# Patient Record
Sex: FEMALE | Race: WHITE | Employment: OTHER
[De-identification: names, ages, dates, MRNs, and addresses within clinical notes are randomized per-mention and may not be internally consistent; named-entity substitution may affect disease eponyms.]

---

## 2017-01-10 RX ORDER — CLONAZEPAM 0.5 MG/1
0.5 TABLET ORAL 2 TIMES DAILY PRN
Qty: 10 TABLET | Refills: 0 | OUTPATIENT
Start: 2017-01-10 | End: 2017-01-24 | Stop reason: SDUPTHER

## 2017-01-17 ENCOUNTER — PROCEDURE VISIT (OUTPATIENT)
Dept: OBGYN | Facility: CLINIC | Age: 52
End: 2017-01-17

## 2017-01-17 VITALS
HEIGHT: 69 IN | BODY MASS INDEX: 32.14 KG/M2 | DIASTOLIC BLOOD PRESSURE: 86 MMHG | HEART RATE: 78 BPM | WEIGHT: 217 LBS | SYSTOLIC BLOOD PRESSURE: 128 MMHG

## 2017-01-17 DIAGNOSIS — R87.610 ATYPICAL SQUAMOUS CELLS OF UNDETERMINED SIGNIFICANCE ON CYTOLOGIC SMEAR OF CERVIX (ASC-US): ICD-10-CM

## 2017-01-17 DIAGNOSIS — Z32.02 NEGATIVE PREGNANCY TEST: ICD-10-CM

## 2017-01-17 DIAGNOSIS — B97.7 HPV IN FEMALE: ICD-10-CM

## 2017-01-17 DIAGNOSIS — R87.810 ASCUS WITH POSITIVE HIGH RISK HPV CERVICAL: Primary | ICD-10-CM

## 2017-01-17 DIAGNOSIS — R87.610 ASCUS WITH POSITIVE HIGH RISK HPV CERVICAL: Primary | ICD-10-CM

## 2017-01-17 PROBLEM — R87.619 ABNORMAL PAP SMEAR OF CERVIX: Status: ACTIVE | Noted: 2017-01-17

## 2017-01-17 LAB
CONTROL: NORMAL
PREGNANCY TEST URINE, POC: NEGATIVE

## 2017-01-17 PROCEDURE — 81025 URINE PREGNANCY TEST: CPT | Performed by: OBSTETRICS & GYNECOLOGY

## 2017-01-17 PROCEDURE — 57456 ENDOCERV CURETTAGE W/SCOPE: CPT | Performed by: OBSTETRICS & GYNECOLOGY

## 2017-01-17 PROCEDURE — 1036F TOBACCO NON-USER: CPT | Performed by: OBSTETRICS & GYNECOLOGY

## 2017-01-20 RX ORDER — CLONAZEPAM 0.5 MG/1
TABLET ORAL
Qty: 10 TABLET | Refills: 0 | OUTPATIENT
Start: 2017-01-20

## 2017-01-24 ENCOUNTER — OFFICE VISIT (OUTPATIENT)
Dept: INTERNAL MEDICINE | Facility: CLINIC | Age: 52
End: 2017-01-24

## 2017-01-24 VITALS
SYSTOLIC BLOOD PRESSURE: 126 MMHG | DIASTOLIC BLOOD PRESSURE: 78 MMHG | HEART RATE: 78 BPM | BODY MASS INDEX: 32.14 KG/M2 | HEIGHT: 69 IN | WEIGHT: 217 LBS

## 2017-01-24 DIAGNOSIS — F41.9 ANXIETY: ICD-10-CM

## 2017-01-24 DIAGNOSIS — I10 ESSENTIAL HYPERTENSION: ICD-10-CM

## 2017-01-24 DIAGNOSIS — Z13.1 DIABETES MELLITUS SCREENING: ICD-10-CM

## 2017-01-24 DIAGNOSIS — E78.1 PURE HYPERGLYCERIDEMIA: ICD-10-CM

## 2017-01-24 DIAGNOSIS — Z00.00 PREVENTATIVE HEALTH CARE: ICD-10-CM

## 2017-01-24 DIAGNOSIS — Z63.6 CAREGIVER STRESS: Primary | ICD-10-CM

## 2017-01-24 PROCEDURE — G8427 DOCREV CUR MEDS BY ELIG CLIN: HCPCS | Performed by: NURSE PRACTITIONER

## 2017-01-24 PROCEDURE — 1036F TOBACCO NON-USER: CPT | Performed by: NURSE PRACTITIONER

## 2017-01-24 PROCEDURE — 99214 OFFICE O/P EST MOD 30 MIN: CPT | Performed by: NURSE PRACTITIONER

## 2017-01-24 PROCEDURE — 3014F SCREEN MAMMO DOC REV: CPT | Performed by: NURSE PRACTITIONER

## 2017-01-24 PROCEDURE — 3017F COLORECTAL CA SCREEN DOC REV: CPT | Performed by: NURSE PRACTITIONER

## 2017-01-24 PROCEDURE — G8484 FLU IMMUNIZE NO ADMIN: HCPCS | Performed by: NURSE PRACTITIONER

## 2017-01-24 PROCEDURE — G8419 CALC BMI OUT NRM PARAM NOF/U: HCPCS | Performed by: NURSE PRACTITIONER

## 2017-01-24 RX ORDER — CLONAZEPAM 0.5 MG/1
0.5 TABLET ORAL 2 TIMES DAILY PRN
Qty: 30 TABLET | Refills: 0 | Status: SHIPPED | OUTPATIENT
Start: 2017-01-24 | End: 2017-02-06 | Stop reason: CLARIF

## 2017-01-24 SDOH — SOCIAL STABILITY - SOCIAL INSECURITY: DEPENDENT RELATIVE NEEDING CARE AT HOME: Z63.6

## 2017-01-24 ASSESSMENT — ENCOUNTER SYMPTOMS
ABDOMINAL PAIN: 0
EYE DISCHARGE: 0
COUGH: 0
BLOOD IN STOOL: 0
SHORTNESS OF BREATH: 0

## 2017-01-30 ENCOUNTER — TELEPHONE (OUTPATIENT)
Dept: INTERNAL MEDICINE | Facility: CLINIC | Age: 52
End: 2017-01-30

## 2017-01-30 DIAGNOSIS — Z13.9 SCREENING: Primary | ICD-10-CM

## 2017-02-06 ENCOUNTER — TELEPHONE (OUTPATIENT)
Dept: INTERNAL MEDICINE | Facility: CLINIC | Age: 52
End: 2017-02-06

## 2017-02-06 DIAGNOSIS — Z63.6 CAREGIVER STRESS: ICD-10-CM

## 2017-02-06 DIAGNOSIS — F41.9 ANXIETY: Primary | ICD-10-CM

## 2017-02-06 RX ORDER — CLONAZEPAM 0.5 MG/1
0.5 TABLET ORAL 2 TIMES DAILY
Qty: 60 TABLET | Refills: 0 | Status: SHIPPED | OUTPATIENT
Start: 2017-02-06 | End: 2017-03-01 | Stop reason: SDUPTHER

## 2017-02-06 SDOH — SOCIAL STABILITY - SOCIAL INSECURITY: DEPENDENT RELATIVE NEEDING CARE AT HOME: Z63.6

## 2017-03-01 ENCOUNTER — OFFICE VISIT (OUTPATIENT)
Dept: INTERNAL MEDICINE | Facility: CLINIC | Age: 52
End: 2017-03-01

## 2017-03-01 VITALS
BODY MASS INDEX: 33.33 KG/M2 | SYSTOLIC BLOOD PRESSURE: 138 MMHG | HEIGHT: 69 IN | WEIGHT: 225 LBS | DIASTOLIC BLOOD PRESSURE: 76 MMHG

## 2017-03-01 DIAGNOSIS — F41.9 ANXIETY: ICD-10-CM

## 2017-03-01 DIAGNOSIS — Z63.6 CAREGIVER STRESS: ICD-10-CM

## 2017-03-01 DIAGNOSIS — I10 ESSENTIAL HYPERTENSION: Primary | ICD-10-CM

## 2017-03-01 PROCEDURE — G8427 DOCREV CUR MEDS BY ELIG CLIN: HCPCS | Performed by: INTERNAL MEDICINE

## 2017-03-01 PROCEDURE — 99213 OFFICE O/P EST LOW 20 MIN: CPT | Performed by: INTERNAL MEDICINE

## 2017-03-01 PROCEDURE — 1036F TOBACCO NON-USER: CPT | Performed by: INTERNAL MEDICINE

## 2017-03-01 PROCEDURE — 3014F SCREEN MAMMO DOC REV: CPT | Performed by: INTERNAL MEDICINE

## 2017-03-01 PROCEDURE — 3017F COLORECTAL CA SCREEN DOC REV: CPT | Performed by: INTERNAL MEDICINE

## 2017-03-01 PROCEDURE — G8484 FLU IMMUNIZE NO ADMIN: HCPCS | Performed by: INTERNAL MEDICINE

## 2017-03-01 PROCEDURE — G8417 CALC BMI ABV UP PARAM F/U: HCPCS | Performed by: INTERNAL MEDICINE

## 2017-03-01 RX ORDER — CLONAZEPAM 0.5 MG/1
0.5 TABLET ORAL 2 TIMES DAILY
Qty: 60 TABLET | Refills: 0 | Status: SHIPPED | OUTPATIENT
Start: 2017-03-01 | End: 2017-04-06 | Stop reason: SDUPTHER

## 2017-03-01 SDOH — SOCIAL STABILITY - SOCIAL INSECURITY: DEPENDENT RELATIVE NEEDING CARE AT HOME: Z63.6

## 2017-03-15 DIAGNOSIS — E78.5 HYPERLIPIDEMIA: ICD-10-CM

## 2017-03-15 DIAGNOSIS — B00.1 RECURRENT COLD SORES: ICD-10-CM

## 2017-03-15 RX ORDER — ATORVASTATIN CALCIUM 40 MG/1
TABLET, FILM COATED ORAL
Qty: 30 TABLET | Refills: 11 | Status: SHIPPED | OUTPATIENT
Start: 2017-03-15 | End: 2017-05-31 | Stop reason: SDUPTHER

## 2017-03-15 RX ORDER — VALACYCLOVIR HYDROCHLORIDE 1 G/1
TABLET, FILM COATED ORAL
Qty: 30 TABLET | Refills: 11 | Status: SHIPPED | OUTPATIENT
Start: 2017-03-15 | End: 2018-03-16 | Stop reason: SDUPTHER

## 2017-04-03 DIAGNOSIS — F41.9 ANXIETY: ICD-10-CM

## 2017-04-03 DIAGNOSIS — Z63.6 CAREGIVER STRESS: ICD-10-CM

## 2017-04-03 SDOH — SOCIAL STABILITY - SOCIAL INSECURITY: DEPENDENT RELATIVE NEEDING CARE AT HOME: Z63.6

## 2017-04-04 RX ORDER — CLONAZEPAM 0.5 MG/1
0.5 TABLET ORAL 2 TIMES DAILY
Qty: 60 TABLET | Refills: 0 | OUTPATIENT
Start: 2017-04-04

## 2017-04-07 DIAGNOSIS — Z63.6 CAREGIVER STRESS: ICD-10-CM

## 2017-04-07 DIAGNOSIS — F41.9 ANXIETY: ICD-10-CM

## 2017-04-07 RX ORDER — CLONAZEPAM 0.5 MG/1
0.5 TABLET ORAL 2 TIMES DAILY
Qty: 60 TABLET | Refills: 0 | Status: SHIPPED | OUTPATIENT
Start: 2017-04-07 | End: 2017-05-03 | Stop reason: SDUPTHER

## 2017-04-07 RX ORDER — CLONAZEPAM 0.5 MG/1
0.5 TABLET ORAL 2 TIMES DAILY
Qty: 60 TABLET | Refills: 0 | OUTPATIENT
Start: 2017-04-07 | End: 2017-04-07 | Stop reason: SDUPTHER

## 2017-04-07 SDOH — SOCIAL STABILITY - SOCIAL INSECURITY: DEPENDENT RELATIVE NEEDING CARE AT HOME: Z63.6

## 2017-05-03 DIAGNOSIS — Z63.6 CAREGIVER STRESS: ICD-10-CM

## 2017-05-03 DIAGNOSIS — F41.9 ANXIETY: ICD-10-CM

## 2017-05-03 RX ORDER — CLONAZEPAM 0.5 MG/1
0.5 TABLET ORAL 2 TIMES DAILY
Qty: 60 TABLET | Refills: 0 | Status: SHIPPED | OUTPATIENT
Start: 2017-05-03 | End: 2017-05-31 | Stop reason: SDUPTHER

## 2017-05-03 SDOH — SOCIAL STABILITY - SOCIAL INSECURITY: DEPENDENT RELATIVE NEEDING CARE AT HOME: Z63.6

## 2017-05-18 RX ORDER — ESCITALOPRAM OXALATE 10 MG/1
TABLET ORAL
Qty: 40 TABLET | Refills: 5 | Status: SHIPPED | OUTPATIENT
Start: 2017-05-18 | End: 2017-08-30 | Stop reason: SDUPTHER

## 2017-05-31 ENCOUNTER — OFFICE VISIT (OUTPATIENT)
Dept: INTERNAL MEDICINE CLINIC | Age: 52
End: 2017-05-31
Payer: MEDICARE

## 2017-05-31 VITALS
WEIGHT: 220 LBS | SYSTOLIC BLOOD PRESSURE: 124 MMHG | HEIGHT: 69 IN | DIASTOLIC BLOOD PRESSURE: 80 MMHG | BODY MASS INDEX: 32.58 KG/M2

## 2017-05-31 DIAGNOSIS — F41.9 ANXIETY: ICD-10-CM

## 2017-05-31 DIAGNOSIS — J01.00 ACUTE MAXILLARY SINUSITIS, RECURRENCE NOT SPECIFIED: Primary | ICD-10-CM

## 2017-05-31 DIAGNOSIS — Z63.6 CAREGIVER STRESS: ICD-10-CM

## 2017-05-31 PROCEDURE — G8427 DOCREV CUR MEDS BY ELIG CLIN: HCPCS | Performed by: INTERNAL MEDICINE

## 2017-05-31 PROCEDURE — 1036F TOBACCO NON-USER: CPT | Performed by: INTERNAL MEDICINE

## 2017-05-31 PROCEDURE — 3017F COLORECTAL CA SCREEN DOC REV: CPT | Performed by: INTERNAL MEDICINE

## 2017-05-31 PROCEDURE — G8417 CALC BMI ABV UP PARAM F/U: HCPCS | Performed by: INTERNAL MEDICINE

## 2017-05-31 PROCEDURE — 3014F SCREEN MAMMO DOC REV: CPT | Performed by: INTERNAL MEDICINE

## 2017-05-31 PROCEDURE — 99213 OFFICE O/P EST LOW 20 MIN: CPT | Performed by: INTERNAL MEDICINE

## 2017-05-31 RX ORDER — AMOXICILLIN AND CLAVULANATE POTASSIUM 875; 125 MG/1; MG/1
1 TABLET, FILM COATED ORAL 2 TIMES DAILY
Qty: 14 TABLET | Refills: 0 | Status: SHIPPED | OUTPATIENT
Start: 2017-05-31 | End: 2017-06-07

## 2017-05-31 RX ORDER — CLONAZEPAM 0.5 MG/1
0.5 TABLET ORAL 2 TIMES DAILY
Qty: 60 TABLET | Refills: 0 | Status: SHIPPED | OUTPATIENT
Start: 2017-05-31 | End: 2017-07-03 | Stop reason: SDUPTHER

## 2017-05-31 SDOH — SOCIAL STABILITY - SOCIAL INSECURITY: DEPENDENT RELATIVE NEEDING CARE AT HOME: Z63.6

## 2017-07-03 DIAGNOSIS — Z63.6 CAREGIVER STRESS: ICD-10-CM

## 2017-07-03 DIAGNOSIS — F41.9 ANXIETY: ICD-10-CM

## 2017-07-03 RX ORDER — CLONAZEPAM 0.5 MG/1
0.5 TABLET ORAL 2 TIMES DAILY
Qty: 60 TABLET | Refills: 0 | Status: SHIPPED | OUTPATIENT
Start: 2017-07-03 | End: 2017-08-02 | Stop reason: SDUPTHER

## 2017-07-03 SDOH — SOCIAL STABILITY - SOCIAL INSECURITY: DEPENDENT RELATIVE NEEDING CARE AT HOME: Z63.6

## 2017-07-24 ENCOUNTER — OFFICE VISIT (OUTPATIENT)
Dept: INTERNAL MEDICINE CLINIC | Age: 52
End: 2017-07-24
Payer: MEDICARE

## 2017-07-24 ENCOUNTER — TELEPHONE (OUTPATIENT)
Dept: INTERNAL MEDICINE CLINIC | Age: 52
End: 2017-07-24

## 2017-07-24 VITALS
WEIGHT: 219 LBS | BODY MASS INDEX: 32.44 KG/M2 | SYSTOLIC BLOOD PRESSURE: 122 MMHG | DIASTOLIC BLOOD PRESSURE: 78 MMHG | HEIGHT: 69 IN

## 2017-07-24 DIAGNOSIS — M25.512 ACUTE PAIN OF LEFT SHOULDER: Primary | ICD-10-CM

## 2017-07-24 DIAGNOSIS — M25.512 LEFT SHOULDER PAIN, UNSPECIFIED CHRONICITY: Primary | ICD-10-CM

## 2017-07-24 PROCEDURE — G8417 CALC BMI ABV UP PARAM F/U: HCPCS | Performed by: NURSE PRACTITIONER

## 2017-07-24 PROCEDURE — G8427 DOCREV CUR MEDS BY ELIG CLIN: HCPCS | Performed by: NURSE PRACTITIONER

## 2017-07-24 PROCEDURE — 1036F TOBACCO NON-USER: CPT | Performed by: NURSE PRACTITIONER

## 2017-07-24 PROCEDURE — 3017F COLORECTAL CA SCREEN DOC REV: CPT | Performed by: NURSE PRACTITIONER

## 2017-07-24 PROCEDURE — 3014F SCREEN MAMMO DOC REV: CPT | Performed by: NURSE PRACTITIONER

## 2017-07-24 PROCEDURE — 99213 OFFICE O/P EST LOW 20 MIN: CPT | Performed by: NURSE PRACTITIONER

## 2017-07-24 RX ORDER — OXYCODONE AND ACETAMINOPHEN 7.5; 325 MG/1; MG/1
TABLET ORAL
COMMUNITY
Start: 2017-07-17

## 2017-07-24 ASSESSMENT — ENCOUNTER SYMPTOMS
SINUS PRESSURE: 0
RHINORRHEA: 0
WHEEZING: 0
COUGH: 0
ABDOMINAL PAIN: 0
SHORTNESS OF BREATH: 0
EYE DISCHARGE: 0
BLOOD IN STOOL: 0

## 2017-08-02 DIAGNOSIS — Z63.6 CAREGIVER STRESS: ICD-10-CM

## 2017-08-02 DIAGNOSIS — F41.9 ANXIETY: ICD-10-CM

## 2017-08-02 RX ORDER — CLONAZEPAM 0.5 MG/1
0.5 TABLET ORAL 2 TIMES DAILY
Qty: 60 TABLET | Refills: 0 | Status: SHIPPED | OUTPATIENT
Start: 2017-08-02 | End: 2017-08-30 | Stop reason: SDUPTHER

## 2017-08-02 SDOH — SOCIAL STABILITY - SOCIAL INSECURITY: DEPENDENT RELATIVE NEEDING CARE AT HOME: Z63.6

## 2017-08-03 ENCOUNTER — HOSPITAL ENCOUNTER (OUTPATIENT)
Age: 52
Discharge: HOME | End: 2017-08-03
Payer: MEDICARE

## 2017-08-03 ENCOUNTER — OFFICE VISIT (OUTPATIENT)
Dept: OBGYN CLINIC | Age: 52
End: 2017-08-03
Payer: MEDICARE

## 2017-08-03 ENCOUNTER — HOSPITAL ENCOUNTER (OUTPATIENT)
Age: 52
Setting detail: SPECIMEN
Discharge: HOME | End: 2017-08-03
Payer: MEDICARE

## 2017-08-03 VITALS
HEIGHT: 69 IN | BODY MASS INDEX: 32.44 KG/M2 | HEART RATE: 78 BPM | WEIGHT: 219 LBS | SYSTOLIC BLOOD PRESSURE: 124 MMHG | RESPIRATION RATE: 18 BRPM | DIASTOLIC BLOOD PRESSURE: 82 MMHG

## 2017-08-03 DIAGNOSIS — Z01.411 ENCOUNTER FOR GYNECOLOGICAL EXAMINATION (GENERAL) (ROUTINE) WITH ABNORMAL FINDINGS: ICD-10-CM

## 2017-08-03 DIAGNOSIS — N89.8 VAGINAL DISCHARGE: ICD-10-CM

## 2017-08-03 DIAGNOSIS — Z20.2 POSSIBLE EXPOSURE TO STD: ICD-10-CM

## 2017-08-03 DIAGNOSIS — R39.9 URINARY TRACT INFECTION SYMPTOMS: ICD-10-CM

## 2017-08-03 DIAGNOSIS — Z12.31 SCREENING MAMMOGRAM, ENCOUNTER FOR: ICD-10-CM

## 2017-08-03 DIAGNOSIS — Z01.419 WELL FEMALE EXAM WITH ROUTINE GYNECOLOGICAL EXAM: Primary | ICD-10-CM

## 2017-08-03 DIAGNOSIS — Z11.51 SPECIAL SCREENING EXAMINATION FOR HUMAN PAPILLOMAVIRUS (HPV): ICD-10-CM

## 2017-08-03 LAB
DIRECT EXAM: NORMAL
HAV AB SERPL IA-ACNC: REACTIVE
HEPATITIS B SURFACE ANTIGEN: NONREACTIVE
HEPATITIS C ANTIBODY: REACTIVE
HIV AG/AB: NONREACTIVE
Lab: NORMAL
SPECIMEN DESCRIPTION: NORMAL
STATUS: NORMAL
T. PALLIDUM, IGG: NONREACTIVE

## 2017-08-03 PROCEDURE — 87491 CHLMYD TRACH DNA AMP PROBE: CPT

## 2017-08-03 PROCEDURE — 87340 HEPATITIS B SURFACE AG IA: CPT

## 2017-08-03 PROCEDURE — 87086 URINE CULTURE/COLONY COUNT: CPT

## 2017-08-03 PROCEDURE — 86694 HERPES SIMPLEX NES ANTBDY: CPT

## 2017-08-03 PROCEDURE — G0101 CA SCREEN;PELVIC/BREAST EXAM: HCPCS | Performed by: ADVANCED PRACTICE MIDWIFE

## 2017-08-03 PROCEDURE — G0145 SCR C/V CYTO,THINLAYER,RESCR: HCPCS

## 2017-08-03 PROCEDURE — 87510 GARDNER VAG DNA DIR PROBE: CPT

## 2017-08-03 PROCEDURE — 87591 N.GONORRHOEAE DNA AMP PROB: CPT

## 2017-08-03 PROCEDURE — 87624 HPV HI-RISK TYP POOLED RSLT: CPT

## 2017-08-03 PROCEDURE — 87389 HIV-1 AG W/HIV-1&-2 AB AG IA: CPT

## 2017-08-03 PROCEDURE — 87480 CANDIDA DNA DIR PROBE: CPT

## 2017-08-03 PROCEDURE — 86803 HEPATITIS C AB TEST: CPT

## 2017-08-03 PROCEDURE — 86780 TREPONEMA PALLIDUM: CPT

## 2017-08-03 PROCEDURE — 86696 HERPES SIMPLEX TYPE 2 TEST: CPT

## 2017-08-03 PROCEDURE — 36415 COLL VENOUS BLD VENIPUNCTURE: CPT

## 2017-08-03 PROCEDURE — 86708 HEPATITIS A ANTIBODY: CPT

## 2017-08-03 PROCEDURE — 87660 TRICHOMONAS VAGIN DIR PROBE: CPT

## 2017-08-03 PROCEDURE — 86695 HERPES SIMPLEX TYPE 1 TEST: CPT

## 2017-08-03 ASSESSMENT — PATIENT HEALTH QUESTIONNAIRE - PHQ9
SUM OF ALL RESPONSES TO PHQ9 QUESTIONS 1 & 2: 0
1. LITTLE INTEREST OR PLEASURE IN DOING THINGS: 0
SUM OF ALL RESPONSES TO PHQ QUESTIONS 1-9: 0
2. FEELING DOWN, DEPRESSED OR HOPELESS: 0
1. LITTLE INTEREST OR PLEASURE IN DOING THINGS: 0
SUM OF ALL RESPONSES TO PHQ9 QUESTIONS 1 & 2: 0
2. FEELING DOWN, DEPRESSED OR HOPELESS: 0
SUM OF ALL RESPONSES TO PHQ QUESTIONS 1-9: 0

## 2017-08-04 ENCOUNTER — TELEPHONE (OUTPATIENT)
Dept: OBGYN CLINIC | Age: 52
End: 2017-08-04

## 2017-08-04 ENCOUNTER — HOSPITAL ENCOUNTER (OUTPATIENT)
Age: 52
Discharge: HOME | End: 2017-08-04
Payer: MEDICARE

## 2017-08-04 DIAGNOSIS — R76.8 HEPATITIS C ANTIBODY TEST POSITIVE: ICD-10-CM

## 2017-08-04 DIAGNOSIS — R76.8 POSITIVE HEPATITIS C ANTIBODY TEST: ICD-10-CM

## 2017-08-04 DIAGNOSIS — R76.8 HEPATITIS A ANTIBODY POSITIVE: Primary | ICD-10-CM

## 2017-08-04 DIAGNOSIS — Z01.89 ENCOUNTER FOR OTHER SPECIFIED SPECIAL EXAMINATIONS: ICD-10-CM

## 2017-08-04 DIAGNOSIS — R76.8 HEPATITIS C ANTIBODY POSITIVE IN BLOOD: ICD-10-CM

## 2017-08-04 DIAGNOSIS — R76.8 HEPATITIS A ANTIBODY POSITIVE: ICD-10-CM

## 2017-08-04 LAB
C TRACH DNA GENITAL QL NAA+PROBE: NEGATIVE
CULTURE: NORMAL
CULTURE: NORMAL
HAV IGM SER IA-ACNC: NONREACTIVE
Lab: NORMAL
N. GONORRHOEAE DNA: NEGATIVE
SPECIMEN DESCRIPTION: NORMAL
SPECIMEN DESCRIPTION: NORMAL
STATUS: NORMAL

## 2017-08-04 PROCEDURE — 87522 HEPATITIS C REVRS TRNSCRPJ: CPT

## 2017-08-04 PROCEDURE — 36415 COLL VENOUS BLD VENIPUNCTURE: CPT

## 2017-08-04 PROCEDURE — 86709 HEPATITIS A IGM ANTIBODY: CPT

## 2017-08-07 ENCOUNTER — TELEPHONE (OUTPATIENT)
Dept: OBGYN CLINIC | Age: 52
End: 2017-08-07

## 2017-08-07 PROBLEM — R76.8 HSV-2 SEROPOSITIVE: Status: ACTIVE | Noted: 2017-08-07

## 2017-08-07 PROBLEM — B00.9 HSV-1 (HERPES SIMPLEX VIRUS 1) INFECTION: Status: ACTIVE | Noted: 2017-08-07

## 2017-08-07 LAB
HERPES SIMPLEX VIRUS 1 IGG: 6.61
HERPES SIMPLEX VIRUS 2 IGG: 4.98
HERPES TYPE 1/2 IGM COMBINED: 0.31
HPV SAMPLE: NORMAL
HPV SOURCE: NORMAL
HPV, GENOTYPE 16: NOT DETECTED
HPV, GENOTYPE 18: NOT DETECTED
HPV, HIGH RISK OTHER: NOT DETECTED
HPV, INTERPRETATION: NORMAL

## 2017-08-08 ENCOUNTER — TELEPHONE (OUTPATIENT)
Dept: OBGYN CLINIC | Age: 52
End: 2017-08-08

## 2017-08-08 LAB
DIRECT EXAM: NORMAL
Lab: NORMAL
SPECIMEN DESCRIPTION: NORMAL
SPECIMEN DESCRIPTION: NORMAL
STATUS: NORMAL

## 2017-08-09 DIAGNOSIS — M25.512 LEFT SHOULDER PAIN, UNSPECIFIED CHRONICITY: Primary | ICD-10-CM

## 2017-08-10 LAB — CYTOLOGY REPORT: NORMAL

## 2017-08-11 ENCOUNTER — OFFICE VISIT (OUTPATIENT)
Dept: ORTHOPEDIC SURGERY | Age: 52
End: 2017-08-11
Payer: MEDICARE

## 2017-08-11 VITALS — HEIGHT: 69 IN | BODY MASS INDEX: 31.7 KG/M2 | WEIGHT: 214 LBS

## 2017-08-11 DIAGNOSIS — M25.512 LEFT SHOULDER PAIN, UNSPECIFIED CHRONICITY: Primary | ICD-10-CM

## 2017-08-11 PROCEDURE — 3017F COLORECTAL CA SCREEN DOC REV: CPT | Performed by: ORTHOPAEDIC SURGERY

## 2017-08-11 PROCEDURE — 3014F SCREEN MAMMO DOC REV: CPT | Performed by: ORTHOPAEDIC SURGERY

## 2017-08-11 PROCEDURE — 1036F TOBACCO NON-USER: CPT | Performed by: ORTHOPAEDIC SURGERY

## 2017-08-11 PROCEDURE — 99203 OFFICE O/P NEW LOW 30 MIN: CPT | Performed by: ORTHOPAEDIC SURGERY

## 2017-08-11 PROCEDURE — G8417 CALC BMI ABV UP PARAM F/U: HCPCS | Performed by: ORTHOPAEDIC SURGERY

## 2017-08-11 PROCEDURE — G8427 DOCREV CUR MEDS BY ELIG CLIN: HCPCS | Performed by: ORTHOPAEDIC SURGERY

## 2017-08-11 RX ORDER — METHYLPREDNISOLONE 4 MG/1
TABLET ORAL
Qty: 1 KIT | Refills: 1 | Status: SHIPPED | OUTPATIENT
Start: 2017-08-11 | End: 2017-08-17

## 2017-08-11 RX ORDER — DICLOFENAC SODIUM 75 MG/1
75 TABLET, DELAYED RELEASE ORAL 2 TIMES DAILY
Qty: 60 TABLET | Refills: 2 | Status: SHIPPED | OUTPATIENT
Start: 2017-08-11 | End: 2017-09-05 | Stop reason: SDUPTHER

## 2017-08-23 ENCOUNTER — HOSPITAL ENCOUNTER (OUTPATIENT)
Dept: WOMENS IMAGING | Age: 52
Discharge: HOME | End: 2017-08-23
Payer: MEDICARE

## 2017-08-23 DIAGNOSIS — Z01.419 WELL FEMALE EXAM WITH ROUTINE GYNECOLOGICAL EXAM: ICD-10-CM

## 2017-08-23 DIAGNOSIS — Z12.31 SCREENING MAMMOGRAM, ENCOUNTER FOR: ICD-10-CM

## 2017-08-23 PROCEDURE — G0202 SCR MAMMO BI INCL CAD: HCPCS

## 2017-08-23 ASSESSMENT — ENCOUNTER SYMPTOMS
ROS SKIN COMMENTS: NEGATIVE FOR RASH
EYE DISCHARGE: 0
SHORTNESS OF BREATH: 0
ABDOMINAL PAIN: 0

## 2017-08-30 DIAGNOSIS — F41.9 ANXIETY: ICD-10-CM

## 2017-08-30 DIAGNOSIS — Z63.6 CAREGIVER STRESS: ICD-10-CM

## 2017-08-30 DIAGNOSIS — Z02.83 ENCOUNTER FOR DRUG SCREENING: Primary | ICD-10-CM

## 2017-08-30 RX ORDER — ESCITALOPRAM OXALATE 10 MG/1
TABLET ORAL
Qty: 60 TABLET | Refills: 6 | Status: SHIPPED | OUTPATIENT
Start: 2017-08-30 | End: 2018-04-05 | Stop reason: SDUPTHER

## 2017-08-30 RX ORDER — CLONAZEPAM 0.5 MG/1
0.5 TABLET ORAL 2 TIMES DAILY
Qty: 60 TABLET | Refills: 0 | Status: SHIPPED | OUTPATIENT
Start: 2017-08-30 | End: 2017-09-26 | Stop reason: SDUPTHER

## 2017-08-30 SDOH — SOCIAL STABILITY - SOCIAL INSECURITY: DEPENDENT RELATIVE NEEDING CARE AT HOME: Z63.6

## 2017-09-05 ENCOUNTER — OFFICE VISIT (OUTPATIENT)
Dept: INTERNAL MEDICINE CLINIC | Age: 52
End: 2017-09-05
Payer: MEDICARE

## 2017-09-05 VITALS
DIASTOLIC BLOOD PRESSURE: 72 MMHG | HEIGHT: 69 IN | WEIGHT: 208 LBS | BODY MASS INDEX: 30.81 KG/M2 | SYSTOLIC BLOOD PRESSURE: 122 MMHG

## 2017-09-05 DIAGNOSIS — K14.0 GLOSSITIS: ICD-10-CM

## 2017-09-05 DIAGNOSIS — I10 ESSENTIAL HYPERTENSION: Primary | ICD-10-CM

## 2017-09-05 PROCEDURE — G8427 DOCREV CUR MEDS BY ELIG CLIN: HCPCS | Performed by: INTERNAL MEDICINE

## 2017-09-05 PROCEDURE — 3017F COLORECTAL CA SCREEN DOC REV: CPT | Performed by: INTERNAL MEDICINE

## 2017-09-05 PROCEDURE — 3014F SCREEN MAMMO DOC REV: CPT | Performed by: INTERNAL MEDICINE

## 2017-09-05 PROCEDURE — 99213 OFFICE O/P EST LOW 20 MIN: CPT | Performed by: INTERNAL MEDICINE

## 2017-09-05 PROCEDURE — 1036F TOBACCO NON-USER: CPT | Performed by: INTERNAL MEDICINE

## 2017-09-05 PROCEDURE — G8417 CALC BMI ABV UP PARAM F/U: HCPCS | Performed by: INTERNAL MEDICINE

## 2017-09-08 ENCOUNTER — OFFICE VISIT (OUTPATIENT)
Dept: ORTHOPEDIC SURGERY | Age: 52
End: 2017-09-08
Payer: MEDICARE

## 2017-09-08 VITALS — BODY MASS INDEX: 30.96 KG/M2 | WEIGHT: 209 LBS | HEIGHT: 69 IN

## 2017-09-08 DIAGNOSIS — M25.512 LEFT SHOULDER PAIN, UNSPECIFIED CHRONICITY: Primary | ICD-10-CM

## 2017-09-08 PROCEDURE — 1036F TOBACCO NON-USER: CPT | Performed by: ORTHOPAEDIC SURGERY

## 2017-09-08 PROCEDURE — G8427 DOCREV CUR MEDS BY ELIG CLIN: HCPCS | Performed by: ORTHOPAEDIC SURGERY

## 2017-09-08 PROCEDURE — 3014F SCREEN MAMMO DOC REV: CPT | Performed by: ORTHOPAEDIC SURGERY

## 2017-09-08 PROCEDURE — G8417 CALC BMI ABV UP PARAM F/U: HCPCS | Performed by: ORTHOPAEDIC SURGERY

## 2017-09-08 PROCEDURE — 20610 DRAIN/INJ JOINT/BURSA W/O US: CPT | Performed by: ORTHOPAEDIC SURGERY

## 2017-09-08 PROCEDURE — 99213 OFFICE O/P EST LOW 20 MIN: CPT | Performed by: ORTHOPAEDIC SURGERY

## 2017-09-08 PROCEDURE — 3017F COLORECTAL CA SCREEN DOC REV: CPT | Performed by: ORTHOPAEDIC SURGERY

## 2017-09-08 RX ORDER — METHYLPREDNISOLONE ACETATE 80 MG/ML
80 INJECTION, SUSPENSION INTRA-ARTICULAR; INTRALESIONAL; INTRAMUSCULAR; SOFT TISSUE ONCE
Status: COMPLETED | OUTPATIENT
Start: 2017-09-08 | End: 2017-09-08

## 2017-09-08 RX ORDER — BUPIVACAINE HYDROCHLORIDE 2.5 MG/ML
2 INJECTION, SOLUTION INFILTRATION; PERINEURAL ONCE
Status: COMPLETED | OUTPATIENT
Start: 2017-09-08 | End: 2017-09-08

## 2017-09-08 RX ADMIN — METHYLPREDNISOLONE ACETATE 80 MG: 80 INJECTION, SUSPENSION INTRA-ARTICULAR; INTRALESIONAL; INTRAMUSCULAR; SOFT TISSUE at 16:18

## 2017-09-08 RX ADMIN — BUPIVACAINE HYDROCHLORIDE 5 MG: 2.5 INJECTION, SOLUTION INFILTRATION; PERINEURAL at 16:30

## 2017-09-08 ASSESSMENT — ENCOUNTER SYMPTOMS
NAUSEA: 0
CONSTIPATION: 0
DIARRHEA: 0
COUGH: 0

## 2017-09-18 ENCOUNTER — TELEPHONE (OUTPATIENT)
Dept: ORTHOPEDIC SURGERY | Age: 52
End: 2017-09-18

## 2017-09-18 DIAGNOSIS — M25.512 ACUTE PAIN OF LEFT SHOULDER: Primary | ICD-10-CM

## 2017-09-23 ENCOUNTER — HOSPITAL ENCOUNTER (OUTPATIENT)
Dept: MRI IMAGING | Age: 52
Discharge: HOME | End: 2017-09-23
Payer: MEDICARE

## 2017-09-23 DIAGNOSIS — M25.512 ACUTE PAIN OF LEFT SHOULDER: ICD-10-CM

## 2017-09-23 PROCEDURE — 73221 MRI JOINT UPR EXTREM W/O DYE: CPT

## 2017-09-26 DIAGNOSIS — F41.9 ANXIETY: ICD-10-CM

## 2017-09-26 DIAGNOSIS — Z63.6 CAREGIVER STRESS: ICD-10-CM

## 2017-09-26 SDOH — SOCIAL STABILITY - SOCIAL INSECURITY: DEPENDENT RELATIVE NEEDING CARE AT HOME: Z63.6

## 2017-09-27 RX ORDER — CLONAZEPAM 0.5 MG/1
0.5 TABLET ORAL 2 TIMES DAILY
Qty: 60 TABLET | Refills: 0 | Status: SHIPPED | OUTPATIENT
Start: 2017-09-27 | End: 2017-10-30 | Stop reason: SDUPTHER

## 2017-09-28 ENCOUNTER — HOSPITAL ENCOUNTER (OUTPATIENT)
Age: 52
Setting detail: SPECIMEN
Discharge: HOME | End: 2017-09-28
Payer: MEDICARE

## 2017-09-28 DIAGNOSIS — Z02.83 ENCOUNTER FOR DRUG SCREENING: ICD-10-CM

## 2017-10-03 LAB
6-ACETYLMORPHINE, UR: NOT DETECTED
7-AMINOCLONAZEPAM, URINE: PRESENT
ALPHA-OH-ALPRAZ, URINE: NOT DETECTED
ALPRAZOLAM, URINE: NOT DETECTED
AMPHETAMINES, URINE: NOT DETECTED
BARBITURATES, URINE: NOT DETECTED
BENZOYLECGONINE, UR: NOT DETECTED
BUPRENORPHINE URINE: NOT DETECTED
CARISOPRODOL, UR: NOT DETECTED
CLONAZEPAM, URINE: NOT DETECTED
CODEINE, URINE: NOT DETECTED
CREATININE URINE: 93 MG/DL (ref 20–400)
DIAZEPAM, URINE: NOT DETECTED
DRUGS EXPECTED, UR: NORMAL
EER HI RES INTERP UR: NORMAL
ETHYL GLUCURONIDE UR: NOT DETECTED
FENTANYL URINE: NOT DETECTED
HYDROCODONE, URINE: NOT DETECTED
HYDROMORPHONE, URINE: NOT DETECTED
LORAZEPAM, URINE: NOT DETECTED
MARIJUANA METAB, UR: NOT DETECTED
MDA, UR: NOT DETECTED
MDEA, EVE, UR: NOT DETECTED
MDMA URINE: NOT DETECTED
MEPERIDINE METAB, UR: NOT DETECTED
METHADONE, URINE: NOT DETECTED
METHAMPHETAMINE, URINE: NOT DETECTED
METHYLPHENIDATE: NOT DETECTED
MIDAZOLAM, URINE: NOT DETECTED
MORPHINE URINE: NOT DETECTED
NORBUPRENORPHINE, URINE: NOT DETECTED
NORDIAZEPAM, URINE: NOT DETECTED
NORFENTANYL, URINE: NOT DETECTED
NORHYDROCODONE, URINE: NOT DETECTED
NOROXYCODONE, URINE: PRESENT
NOROXYMORPHONE, URINE: PRESENT
OXAZEPAM, URINE: NOT DETECTED
OXYCODONE URINE: PRESENT
OXYMORPHONE, URINE: NOT DETECTED
PAIN MANAGEMENT DRUG PANEL INTERP, URINE: NORMAL
PAIN MGT DRUG PANEL, HI RES, UR: NORMAL
PCP,URINE: NOT DETECTED
PHENTERMINE, UR: NOT DETECTED
PROPOXYPHENE, URINE: NOT DETECTED
TAPENTADOL, URINE: NOT DETECTED
TAPENTADOL-O-SULFATE, URINE: NOT DETECTED
TEMAZEPAM, URINE: NOT DETECTED
TRAMADOL, URINE: NOT DETECTED
ZOLPIDEM, URINE: NOT DETECTED

## 2017-10-04 ENCOUNTER — OFFICE VISIT (OUTPATIENT)
Dept: ORTHOPEDIC SURGERY | Age: 52
End: 2017-10-04
Payer: MEDICARE

## 2017-10-04 VITALS — HEIGHT: 69 IN | BODY MASS INDEX: 30.21 KG/M2 | WEIGHT: 204 LBS

## 2017-10-04 DIAGNOSIS — M75.42 ROTATOR CUFF IMPINGEMENT SYNDROME, LEFT: Primary | ICD-10-CM

## 2017-10-04 PROCEDURE — 1036F TOBACCO NON-USER: CPT | Performed by: ORTHOPAEDIC SURGERY

## 2017-10-04 PROCEDURE — G8427 DOCREV CUR MEDS BY ELIG CLIN: HCPCS | Performed by: ORTHOPAEDIC SURGERY

## 2017-10-04 PROCEDURE — G8417 CALC BMI ABV UP PARAM F/U: HCPCS | Performed by: ORTHOPAEDIC SURGERY

## 2017-10-04 PROCEDURE — 99213 OFFICE O/P EST LOW 20 MIN: CPT | Performed by: ORTHOPAEDIC SURGERY

## 2017-10-04 PROCEDURE — 3017F COLORECTAL CA SCREEN DOC REV: CPT | Performed by: ORTHOPAEDIC SURGERY

## 2017-10-04 PROCEDURE — G8484 FLU IMMUNIZE NO ADMIN: HCPCS | Performed by: ORTHOPAEDIC SURGERY

## 2017-10-04 PROCEDURE — 3014F SCREEN MAMMO DOC REV: CPT | Performed by: ORTHOPAEDIC SURGERY

## 2017-10-04 RX ORDER — METHYLPREDNISOLONE 4 MG/1
TABLET ORAL
Qty: 1 KIT | Refills: 1 | Status: SHIPPED | OUTPATIENT
Start: 2017-10-04 | End: 2017-10-10

## 2017-10-04 ASSESSMENT — ENCOUNTER SYMPTOMS
WHEEZING: 0
NAUSEA: 0
DIARRHEA: 0
CONSTIPATION: 0
COUGH: 0
TROUBLE SWALLOWING: 0
ABDOMINAL PAIN: 0
VOMITING: 0
VOICE CHANGE: 0
CHOKING: 0
SHORTNESS OF BREATH: 0

## 2017-10-04 NOTE — PROGRESS NOTES
weakness and pain with internal rotation resistance on the left. Motor, sensory, vascular examination left upper extremity is grossly intact without focal deficits. Neuro: alert. oriented  Eyes: Extra-ocular muscles intact  Mouth: Oral mucosa moist. No perioral lesions  Pulm: Respirations unlabored and regular. Skin: warm, well perfused  Psych:   Patient has good fund of knowledge and displays understanging of exam, diagnosis, and plan. Assessment:      1. Rotator cuff impingement syndrome, left       Plan:      Discussed the etiologies of left RTC inflammation. I would like to have formal physical therapy over the next 6 weeks to see if that will help the patient. I plan to see the patient back in 8 weeks or sooner as necessary. Leia Oquendo LPN am scribing for and in the presence of Dr Abby Zapien. 10/15/2017  10:48 PM              Follow up:Return in about 4 weeks (around 11/1/2017). Orders Placed This Encounter   Medications    methylPREDNISolone (MEDROL DOSEPACK) 4 MG tablet     Sig: Take by mouth. Dispense:  1 kit     Refill:  1          Orders Placed This Encounter   Procedures    Ambulatory referral to Physical Therapy     Referral Priority:   Routine     Referral Type:   Eval and Treat     Referral Reason:   Specialty Services Required     Requested Specialty:   Physical Therapy     Number of Visits Requested:   1       I have reviewed and made changes accordingly to the work scribed by Chico Pinto LPN. The documentation accurately reflects work and decisions made by me. I have also reviewed documentation completed by clinical staff.     Abby Zapien DO, [address] [place] [address]  10/15/2017 10:49 PM      Electronically signed by Melanie Correa DO, FAOAO on 10/15/2017 at 10:48 PM

## 2017-10-04 NOTE — MR AVS SNAPSHOT
S/P removal of right ovary    Personal history of tobacco use, presenting hazards to health    Urinary frequency    Carpal tunnel syndrome, bilateral    Vision abnormalities    History of ankle fracture    Carpal tunnel syndrome on right (Chronic)    Obesity    HSV-1 (herpes simplex virus 1) infection    HSV-2 seropositive    Anxiety    Caregiver stress    Obesity, unspecified    Arthritis    Mood swings (HCC)    Abnormal Pap smear of cervix    Carpal tunnel syndrome, bilateral    Status post carpal tunnel release of both wrists    Hyperlipidemia    History of pneumonia      Preventive Care        Date Due    Tetanus Combination Vaccine (1 - Tdap) 5/6/1983    Yearly Flu Vaccine (1) 9/1/2017    Pap Smear 8/3/2018    Colonoscopy 8/15/2019    Mammograms are recommended every 2 years for low/average risk patients aged 48 - 69, and every year for high risk patients per updated national guidelines. However these guidelines can be individualized by your provider. 8/23/2019    Cholesterol Screening 12/29/2020            MyChart Signup           Our records indicate that you have declined MyChart signup.

## 2017-10-30 DIAGNOSIS — Z63.6 CAREGIVER STRESS: ICD-10-CM

## 2017-10-30 DIAGNOSIS — F41.9 ANXIETY: ICD-10-CM

## 2017-10-30 RX ORDER — CLONAZEPAM 0.5 MG/1
0.5 TABLET ORAL 2 TIMES DAILY
Qty: 60 TABLET | Refills: 0 | Status: SHIPPED | OUTPATIENT
Start: 2017-10-30 | End: 2017-11-27 | Stop reason: SDUPTHER

## 2017-10-30 SDOH — SOCIAL STABILITY - SOCIAL INSECURITY: DEPENDENT RELATIVE NEEDING CARE AT HOME: Z63.6

## 2017-10-30 NOTE — TELEPHONE ENCOUNTER
Medication Requested: klonopin    Last visit: 17  Next visit: Visit date not found  Last refill: 17    Med contract on file:  [x] yes   [] no    Last urine drug screen: 17  Consistent with medication(s):    [x] yes   [] no    Last OARRS ran: 17    Quantity of medication remainin pills    Who will be picking rx up: patient    Pharmacy if escribed: [facility]

## 2017-11-27 DIAGNOSIS — Z63.6 CAREGIVER STRESS: ICD-10-CM

## 2017-11-27 DIAGNOSIS — F41.9 ANXIETY: ICD-10-CM

## 2017-11-27 SDOH — SOCIAL STABILITY - SOCIAL INSECURITY: DEPENDENT RELATIVE NEEDING CARE AT HOME: Z63.6

## 2017-11-28 RX ORDER — CLONAZEPAM 0.5 MG/1
0.5 TABLET ORAL 2 TIMES DAILY
Qty: 60 TABLET | Refills: 0 | Status: SHIPPED | OUTPATIENT
Start: 2017-11-28 | End: 2017-12-26 | Stop reason: SDUPTHER

## 2017-12-04 RX ORDER — AMLODIPINE BESYLATE 5 MG/1
TABLET ORAL
Qty: 30 TABLET | Refills: 10 | Status: SHIPPED | OUTPATIENT
Start: 2017-12-04 | End: 2018-11-04 | Stop reason: SDUPTHER

## 2017-12-07 ENCOUNTER — OFFICE VISIT (OUTPATIENT)
Dept: ORTHOPEDIC SURGERY | Age: 52
End: 2017-12-07
Payer: MEDICARE

## 2017-12-07 VITALS — HEIGHT: 69 IN | BODY MASS INDEX: 30.2 KG/M2 | WEIGHT: 203.93 LBS

## 2017-12-07 DIAGNOSIS — M75.42 ROTATOR CUFF IMPINGEMENT SYNDROME, LEFT: Primary | ICD-10-CM

## 2017-12-07 PROCEDURE — G8417 CALC BMI ABV UP PARAM F/U: HCPCS | Performed by: ORTHOPAEDIC SURGERY

## 2017-12-07 PROCEDURE — 1036F TOBACCO NON-USER: CPT | Performed by: ORTHOPAEDIC SURGERY

## 2017-12-07 PROCEDURE — 3017F COLORECTAL CA SCREEN DOC REV: CPT | Performed by: ORTHOPAEDIC SURGERY

## 2017-12-07 PROCEDURE — G8427 DOCREV CUR MEDS BY ELIG CLIN: HCPCS | Performed by: ORTHOPAEDIC SURGERY

## 2017-12-07 PROCEDURE — G8484 FLU IMMUNIZE NO ADMIN: HCPCS | Performed by: ORTHOPAEDIC SURGERY

## 2017-12-07 PROCEDURE — 99213 OFFICE O/P EST LOW 20 MIN: CPT | Performed by: ORTHOPAEDIC SURGERY

## 2017-12-07 PROCEDURE — 3014F SCREEN MAMMO DOC REV: CPT | Performed by: ORTHOPAEDIC SURGERY

## 2017-12-07 ASSESSMENT — ENCOUNTER SYMPTOMS
CONSTIPATION: 0
NAUSEA: 0
COUGH: 0
DIARRHEA: 0

## 2017-12-07 NOTE — PROGRESS NOTES
[address] [place] [address] [place] [address]  Dept: [phone]  Dept Fax: [phone]        Ambulatory Follow Up      Subjective:   Verna Purcell is a 47y.o. year old female who presents to our office today for routine followup regarding her   1. Rotator cuff impingement syndrome, left    . Chief Complaint   Patient presents with    Shoulder Pain     left        HPI-Patient is here today for 6 week follow up for her left shoulder. Patient says the steroid pack helps immensely and is the only thing that seems to help her pain. Patient says when she takes  A steroid pack she feels 90% better. When she is off she goes back to having some pain, but not as bad as the original pain. Therefore overall she does note some improvement with the MDPs. Patient is doing a home exercise program for her shoulder and she says she can do more exercises when she is on the steroid pack. Patient had a cortisone injection on 09/08/217 with no improvement. Review of Systems   Constitutional: Negative for chills and fever. Respiratory: Negative for cough. Gastrointestinal: Negative for constipation, diarrhea and nausea. Musculoskeletal: Positive for arthralgias (left shoulder). Negative for gait problem, joint swelling and myalgias. Neurological: Negative for dizziness, weakness and numbness. I have reviewed the CC, HPI, ROS, PMH, FHX, Social History. I agree with the documentation provided by other staff, residents, and/or medical students and have reviewed their documentation prior to providing my signature indicating agreement. Objective :   General: Verna Purcell is a 47 y.o. female who is alert and oriented and sitting comfortably in our office. Ortho Exam  MS:  F=160 AB=160 Good ER strength Good IR strength, mild pain with ER and IR left shoulder  Neuro: alert.  oriented  Eyes: Extra-ocular muscles intact  Mouth: Oral mucosa moist. No perioral lesions  Pulm: Respirations unlabored and regular. Skin: warm, well perfused  Psych:   Patient has good fund of knowledge and displays understanging of exam, diagnosis, and plan. Assessment:      1. Rotator cuff impingement syndrome, left       Plan: At this time the patient does not want to proceed with formal PT, she would like to continue with her HEP. She will stop the Voltaren 100mg QD and start the Voltaren 75mg BID. I will also send a MDP for her to take in the future if needed. Follow up 6-8 weeks    Follow up:Return in about 8 weeks (around 2/1/2018). Rahat Anderson RN am scribing for and in the presence of Dr. Paulo Dexter  12/17/2017 11:07 PM    I have reviewed and made changes accordingly to the work scribed by Kanwal Patino RN. The documentation accurately reflects work and decisions made by me. I have also reviewed documentation completed by clinical staff. Paulo Dexter DO, [address] [place] [address]  12/17/2017 11:08 PM      Orders Placed This Encounter   Medications    diclofenac (VOLTAREN) 75 MG EC tablet     Sig: Take 1 tablet by mouth 2 times daily     Dispense:  60 tablet     Refill:  2    methylPREDNISolone (MEDROL DOSEPACK) 4 MG tablet     Sig: Take by mouth. Dispense:  1 kit     Refill:  1          No orders of the defined types were placed in this encounter.       Electronically signed by Yani Cummins DO, [facility] on 12/17/2017 at 11:07 PM

## 2017-12-08 RX ORDER — METHYLPREDNISOLONE 4 MG/1
TABLET ORAL
Qty: 1 KIT | Refills: 1 | Status: SHIPPED | OUTPATIENT
Start: 2017-12-08 | End: 2017-12-13

## 2017-12-08 RX ORDER — DICLOFENAC SODIUM 75 MG/1
75 TABLET, DELAYED RELEASE ORAL 2 TIMES DAILY
Qty: 60 TABLET | Refills: 2 | Status: SHIPPED | OUTPATIENT
Start: 2017-12-08 | End: 2018-08-15 | Stop reason: ALTCHOICE

## 2017-12-26 DIAGNOSIS — Z63.6 CAREGIVER STRESS: ICD-10-CM

## 2017-12-26 DIAGNOSIS — F41.9 ANXIETY: ICD-10-CM

## 2017-12-26 SDOH — SOCIAL STABILITY - SOCIAL INSECURITY: DEPENDENT RELATIVE NEEDING CARE AT HOME: Z63.6

## 2017-12-26 NOTE — TELEPHONE ENCOUNTER
Medication Requested: Klonopin    Last visit: 17  Next visit: Visit date not found  Last refill: 17    Med contract on file:  [x] yes   [] no    Last urine drug screen: 17  Consistent with medication(s):    [x] yes   [] no    Last OARRS ran: 17    Quantity of medication remainin    Who will be picking rx up: self or pts boyfriend, [address] if escribed: [facility]ELIZ

## 2017-12-28 RX ORDER — CLONAZEPAM 0.5 MG/1
0.5 TABLET ORAL 2 TIMES DAILY
Qty: 60 TABLET | Refills: 0 | Status: SHIPPED | OUTPATIENT
Start: 2017-12-28 | End: 2018-01-29 | Stop reason: SDUPTHER

## 2018-01-29 DIAGNOSIS — F41.9 ANXIETY: ICD-10-CM

## 2018-01-29 DIAGNOSIS — Z63.6 CAREGIVER STRESS: ICD-10-CM

## 2018-01-29 RX ORDER — CLONAZEPAM 0.5 MG/1
0.5 TABLET ORAL 2 TIMES DAILY
Qty: 60 TABLET | Refills: 0 | Status: SHIPPED | OUTPATIENT
Start: 2018-01-29 | End: 2018-02-27 | Stop reason: SDUPTHER

## 2018-01-29 SDOH — SOCIAL STABILITY - SOCIAL INSECURITY: DEPENDENT RELATIVE NEEDING CARE AT HOME: Z63.6

## 2018-01-30 DIAGNOSIS — M75.42 ROTATOR CUFF IMPINGEMENT SYNDROME, LEFT: Primary | ICD-10-CM

## 2018-01-30 RX ORDER — METHYLPREDNISOLONE 4 MG/1
TABLET ORAL
Qty: 1 KIT | Refills: 0 | Status: SHIPPED | OUTPATIENT
Start: 2018-01-30 | End: 2018-02-05

## 2018-02-08 ENCOUNTER — OFFICE VISIT (OUTPATIENT)
Dept: ORTHOPEDIC SURGERY | Age: 53
End: 2018-02-08
Payer: MEDICARE

## 2018-02-08 VITALS
DIASTOLIC BLOOD PRESSURE: 78 MMHG | WEIGHT: 192.4 LBS | BODY MASS INDEX: 28.5 KG/M2 | SYSTOLIC BLOOD PRESSURE: 134 MMHG | HEIGHT: 69 IN

## 2018-02-08 DIAGNOSIS — M75.42 IMPINGEMENT SYNDROME OF LEFT SHOULDER: Primary | ICD-10-CM

## 2018-02-08 PROCEDURE — 3017F COLORECTAL CA SCREEN DOC REV: CPT | Performed by: ORTHOPAEDIC SURGERY

## 2018-02-08 PROCEDURE — G8427 DOCREV CUR MEDS BY ELIG CLIN: HCPCS | Performed by: ORTHOPAEDIC SURGERY

## 2018-02-08 PROCEDURE — G8484 FLU IMMUNIZE NO ADMIN: HCPCS | Performed by: ORTHOPAEDIC SURGERY

## 2018-02-08 PROCEDURE — 1036F TOBACCO NON-USER: CPT | Performed by: ORTHOPAEDIC SURGERY

## 2018-02-08 PROCEDURE — 20610 DRAIN/INJ JOINT/BURSA W/O US: CPT | Performed by: ORTHOPAEDIC SURGERY

## 2018-02-08 PROCEDURE — G8417 CALC BMI ABV UP PARAM F/U: HCPCS | Performed by: ORTHOPAEDIC SURGERY

## 2018-02-08 PROCEDURE — 99213 OFFICE O/P EST LOW 20 MIN: CPT | Performed by: ORTHOPAEDIC SURGERY

## 2018-02-08 PROCEDURE — 3014F SCREEN MAMMO DOC REV: CPT | Performed by: ORTHOPAEDIC SURGERY

## 2018-02-08 RX ORDER — DICLOFENAC SODIUM 25 MG/1
TABLET, DELAYED RELEASE ORAL
COMMUNITY
Start: 2015-08-24 | End: 2018-08-15 | Stop reason: ALTCHOICE

## 2018-02-08 RX ORDER — ESCITALOPRAM OXALATE 10 MG/1
TABLET ORAL
COMMUNITY
Start: 2017-08-06 | End: 2018-08-15 | Stop reason: SDUPTHER

## 2018-02-08 RX ORDER — TOPIRAMATE 100 MG/1
TABLET, FILM COATED ORAL
COMMUNITY
Start: 2015-07-27 | End: 2018-08-15 | Stop reason: SDUPTHER

## 2018-02-08 RX ORDER — ATORVASTATIN CALCIUM 40 MG/1
TABLET, FILM COATED ORAL
COMMUNITY
Start: 2017-07-22 | End: 2018-08-15 | Stop reason: SDUPTHER

## 2018-02-08 RX ORDER — CLONAZEPAM 0.5 MG/1
TABLET ORAL
COMMUNITY
Start: 2017-08-03 | End: 2018-03-29 | Stop reason: SDUPTHER

## 2018-02-08 RX ORDER — AMLODIPINE BESYLATE 5 MG/1
TABLET ORAL
COMMUNITY
Start: 2017-08-06 | End: 2018-08-15 | Stop reason: ALTCHOICE

## 2018-02-08 RX ORDER — BUPIVACAINE HYDROCHLORIDE 2.5 MG/ML
2 INJECTION, SOLUTION INFILTRATION; PERINEURAL ONCE
Status: COMPLETED | OUTPATIENT
Start: 2018-02-08 | End: 2018-02-09

## 2018-02-08 RX ORDER — ONDANSETRON 4 MG/1
TABLET, FILM COATED ORAL
COMMUNITY
Start: 2016-04-01 | End: 2018-08-15 | Stop reason: SDUPTHER

## 2018-02-08 RX ORDER — TOPIRAMATE 50 MG/1
TABLET, FILM COATED ORAL
COMMUNITY
Start: 2015-10-17 | End: 2018-08-15 | Stop reason: SDUPTHER

## 2018-02-08 RX ORDER — VALACYCLOVIR HYDROCHLORIDE 1 G/1
TABLET, FILM COATED ORAL
COMMUNITY
Start: 2017-07-22 | End: 2018-08-15 | Stop reason: SDUPTHER

## 2018-02-08 RX ORDER — OXYCODONE AND ACETAMINOPHEN 7.5; 325 MG/1; MG/1
TABLET ORAL
COMMUNITY
Start: 2017-07-17 | End: 2018-08-15 | Stop reason: SDUPTHER

## 2018-02-08 RX ORDER — METHYLPREDNISOLONE ACETATE 80 MG/ML
80 INJECTION, SUSPENSION INTRA-ARTICULAR; INTRALESIONAL; INTRAMUSCULAR; SOFT TISSUE ONCE
Status: COMPLETED | OUTPATIENT
Start: 2018-02-08 | End: 2018-02-09

## 2018-02-08 ASSESSMENT — ENCOUNTER SYMPTOMS
COUGH: 0
CONSTIPATION: 0
DIARRHEA: 0
NAUSEA: 0

## 2018-02-09 RX ADMIN — METHYLPREDNISOLONE ACETATE 80 MG: 80 INJECTION, SUSPENSION INTRA-ARTICULAR; INTRALESIONAL; INTRAMUSCULAR; SOFT TISSUE at 12:40

## 2018-02-09 RX ADMIN — BUPIVACAINE HYDROCHLORIDE 5 MG: 2.5 INJECTION, SOLUTION INFILTRATION; PERINEURAL at 12:40

## 2018-02-27 DIAGNOSIS — F41.9 ANXIETY: ICD-10-CM

## 2018-02-27 DIAGNOSIS — Z63.6 CAREGIVER STRESS: ICD-10-CM

## 2018-02-27 SDOH — SOCIAL STABILITY - SOCIAL INSECURITY: DEPENDENT RELATIVE NEEDING CARE AT HOME: Z63.6

## 2018-03-01 RX ORDER — CLONAZEPAM 0.5 MG/1
0.5 TABLET ORAL 2 TIMES DAILY PRN
Qty: 50 TABLET | Refills: 0 | Status: SHIPPED | OUTPATIENT
Start: 2018-03-01 | End: 2018-08-15 | Stop reason: ALTCHOICE

## 2018-03-01 NOTE — TELEPHONE ENCOUNTER
Per patient her niece, Gudelia Alfaro will be picking up script, advised that she must bring photo Id.

## 2018-03-08 ENCOUNTER — TELEPHONE (OUTPATIENT)
Dept: ORTHOPEDIC SURGERY | Age: 53
End: 2018-03-08

## 2018-03-08 DIAGNOSIS — M75.42 IMPINGEMENT SYNDROME OF LEFT SHOULDER: Primary | ICD-10-CM

## 2018-03-08 RX ORDER — METHYLPREDNISOLONE 4 MG/1
TABLET ORAL
Qty: 1 KIT | Refills: 0 | Status: SHIPPED | OUTPATIENT
Start: 2018-03-08 | End: 2018-04-02 | Stop reason: SDUPTHER

## 2018-03-08 NOTE — TELEPHONE ENCOUNTER
Patient is having left shoulder pain for the last few days would like a medrol dose called into [facility] [place].

## 2018-03-16 DIAGNOSIS — B00.1 RECURRENT COLD SORES: ICD-10-CM

## 2018-03-19 RX ORDER — VALACYCLOVIR HYDROCHLORIDE 1 G/1
TABLET, FILM COATED ORAL
Qty: 30 TABLET | Refills: 10 | Status: SHIPPED | OUTPATIENT
Start: 2018-03-19 | End: 2019-02-20 | Stop reason: SDUPTHER

## 2018-03-23 DIAGNOSIS — E78.5 HYPERLIPIDEMIA: ICD-10-CM

## 2018-03-23 RX ORDER — ATORVASTATIN CALCIUM 40 MG/1
TABLET, FILM COATED ORAL
Qty: 30 TABLET | Refills: 10 | Status: SHIPPED | OUTPATIENT
Start: 2018-03-23 | End: 2018-08-15 | Stop reason: SDUPTHER

## 2018-04-02 ENCOUNTER — TELEPHONE (OUTPATIENT)
Dept: ORTHOPEDIC SURGERY | Age: 53
End: 2018-04-02

## 2018-04-02 DIAGNOSIS — M75.42 IMPINGEMENT SYNDROME OF LEFT SHOULDER: Primary | ICD-10-CM

## 2018-04-02 RX ORDER — METHYLPREDNISOLONE 4 MG/1
TABLET ORAL
Qty: 1 KIT | Refills: 0 | Status: SHIPPED | OUTPATIENT
Start: 2018-04-02 | End: 2018-04-08

## 2018-04-04 RX ORDER — CLONAZEPAM 0.5 MG/1
0.5 TABLET ORAL 2 TIMES DAILY PRN
Qty: 50 TABLET | Refills: 1 | Status: SHIPPED | OUTPATIENT
Start: 2018-04-04 | End: 2018-08-15 | Stop reason: ALTCHOICE

## 2018-04-05 RX ORDER — ESCITALOPRAM OXALATE 10 MG/1
TABLET ORAL
Qty: 60 TABLET | Refills: 4 | Status: SHIPPED | OUTPATIENT
Start: 2018-04-05 | End: 2018-09-17 | Stop reason: SDUPTHER

## 2018-05-10 DIAGNOSIS — M75.42 ROTATOR CUFF IMPINGEMENT SYNDROME, LEFT: Primary | ICD-10-CM

## 2018-05-10 RX ORDER — METHYLPREDNISOLONE 4 MG/1
TABLET ORAL
Qty: 1 KIT | Refills: 0 | Status: SHIPPED | OUTPATIENT
Start: 2018-05-10 | End: 2018-11-14 | Stop reason: ALTCHOICE

## 2018-06-14 ENCOUNTER — OFFICE VISIT (OUTPATIENT)
Dept: ORTHOPEDIC SURGERY | Age: 53
End: 2018-06-14
Payer: MEDICARE

## 2018-06-14 VITALS — WEIGHT: 192.46 LBS | BODY MASS INDEX: 28.51 KG/M2 | HEIGHT: 69 IN

## 2018-06-14 DIAGNOSIS — M77.8 SHOULDER TENDINITIS, LEFT: Primary | ICD-10-CM

## 2018-06-14 PROCEDURE — G8427 DOCREV CUR MEDS BY ELIG CLIN: HCPCS | Performed by: ORTHOPAEDIC SURGERY

## 2018-06-14 PROCEDURE — 1036F TOBACCO NON-USER: CPT | Performed by: ORTHOPAEDIC SURGERY

## 2018-06-14 PROCEDURE — G8417 CALC BMI ABV UP PARAM F/U: HCPCS | Performed by: ORTHOPAEDIC SURGERY

## 2018-06-14 PROCEDURE — 3017F COLORECTAL CA SCREEN DOC REV: CPT | Performed by: ORTHOPAEDIC SURGERY

## 2018-06-14 PROCEDURE — 99213 OFFICE O/P EST LOW 20 MIN: CPT | Performed by: ORTHOPAEDIC SURGERY

## 2018-06-14 RX ORDER — METHYLPREDNISOLONE 4 MG/1
TABLET ORAL
Qty: 1 KIT | Refills: 0 | Status: SHIPPED | OUTPATIENT
Start: 2018-06-14 | End: 2018-06-20

## 2018-06-14 RX ORDER — MELOXICAM 15 MG/1
15 TABLET ORAL DAILY
Qty: 30 TABLET | Refills: 3 | Status: SHIPPED | OUTPATIENT
Start: 2018-06-14 | End: 2018-10-13 | Stop reason: SDUPTHER

## 2018-06-14 ASSESSMENT — ENCOUNTER SYMPTOMS
SHORTNESS OF BREATH: 0
WHEEZING: 0
BACK PAIN: 0

## 2018-06-20 ENCOUNTER — HOSPITAL ENCOUNTER (OUTPATIENT)
Dept: PHYSICAL THERAPY | Age: 53
Setting detail: THERAPIES SERIES
Discharge: HOME | End: 2018-06-20
Payer: MEDICARE

## 2018-06-20 PROCEDURE — 97161 PT EVAL LOW COMPLEX 20 MIN: CPT

## 2018-06-20 PROCEDURE — G8984 CARRY CURRENT STATUS: HCPCS

## 2018-06-20 PROCEDURE — G0283 ELEC STIM OTHER THAN WOUND: HCPCS

## 2018-06-20 PROCEDURE — 97110 THERAPEUTIC EXERCISES: CPT

## 2018-06-20 PROCEDURE — G8985 CARRY GOAL STATUS: HCPCS

## 2018-06-20 ASSESSMENT — PAIN SCALES - GENERAL: PAINLEVEL_OUTOF10: 10

## 2018-06-20 ASSESSMENT — PAIN DESCRIPTION - LOCATION: LOCATION: SHOULDER

## 2018-06-20 ASSESSMENT — PAIN DESCRIPTION - FREQUENCY: FREQUENCY: CONTINUOUS

## 2018-06-20 ASSESSMENT — PAIN DESCRIPTION - DESCRIPTORS: DESCRIPTORS: CONSTANT;SHARP

## 2018-06-20 ASSESSMENT — PAIN DESCRIPTION - ORIENTATION: ORIENTATION: LEFT

## 2018-06-26 ENCOUNTER — HOSPITAL ENCOUNTER (OUTPATIENT)
Dept: PHYSICAL THERAPY | Age: 53
Setting detail: THERAPIES SERIES
Discharge: HOME | End: 2018-06-26
Payer: MEDICARE

## 2018-06-26 PROCEDURE — G0283 ELEC STIM OTHER THAN WOUND: HCPCS

## 2018-06-26 PROCEDURE — 97110 THERAPEUTIC EXERCISES: CPT

## 2018-06-26 ASSESSMENT — PAIN DESCRIPTION - DESCRIPTORS: DESCRIPTORS: CONSTANT

## 2018-06-26 ASSESSMENT — PAIN DESCRIPTION - FREQUENCY: FREQUENCY: CONTINUOUS

## 2018-06-26 ASSESSMENT — PAIN SCALES - GENERAL: PAINLEVEL_OUTOF10: 9

## 2018-06-26 ASSESSMENT — PAIN DESCRIPTION - LOCATION: LOCATION: SHOULDER

## 2018-06-26 ASSESSMENT — PAIN DESCRIPTION - ORIENTATION: ORIENTATION: LEFT

## 2018-06-28 ENCOUNTER — HOSPITAL ENCOUNTER (OUTPATIENT)
Dept: PHYSICAL THERAPY | Age: 53
Setting detail: THERAPIES SERIES
Discharge: HOME | End: 2018-06-28
Payer: MEDICARE

## 2018-08-15 ENCOUNTER — OFFICE VISIT (OUTPATIENT)
Dept: OBGYN CLINIC | Age: 53
End: 2018-08-15

## 2018-08-15 ENCOUNTER — HOSPITAL ENCOUNTER (OUTPATIENT)
Age: 53
Setting detail: SPECIMEN
Discharge: HOME | End: 2018-08-15
Payer: MEDICARE

## 2018-08-15 VITALS
HEART RATE: 78 BPM | BODY MASS INDEX: 25.48 KG/M2 | WEIGHT: 172 LBS | SYSTOLIC BLOOD PRESSURE: 120 MMHG | DIASTOLIC BLOOD PRESSURE: 78 MMHG | HEIGHT: 69 IN | RESPIRATION RATE: 18 BRPM

## 2018-08-15 DIAGNOSIS — Z01.411 ENCOUNTER FOR GYNECOLOGICAL EXAMINATION WITH ABNORMAL FINDING: ICD-10-CM

## 2018-08-15 DIAGNOSIS — Z11.51 SPECIAL SCREENING EXAMINATION FOR HUMAN PAPILLOMAVIRUS (HPV): ICD-10-CM

## 2018-08-15 DIAGNOSIS — Z78.0 POST-MENOPAUSAL: ICD-10-CM

## 2018-08-15 DIAGNOSIS — Z11.51 SPECIAL SCREENING EXAMINATION FOR HUMAN PAPILLOMAVIRUS (HPV): Primary | ICD-10-CM

## 2018-08-15 DIAGNOSIS — Z12.31 SCREENING MAMMOGRAM, ENCOUNTER FOR: ICD-10-CM

## 2018-08-15 DIAGNOSIS — Z13.820 ENCOUNTER FOR SCREENING FOR OSTEOPOROSIS: ICD-10-CM

## 2018-08-15 DIAGNOSIS — R45.86 MOOD SWINGS: ICD-10-CM

## 2018-08-15 PROCEDURE — G0145 SCR C/V CYTO,THINLAYER,RESCR: HCPCS

## 2018-08-15 PROCEDURE — G0101 CA SCREEN;PELVIC/BREAST EXAM: HCPCS | Performed by: ADVANCED PRACTICE MIDWIFE

## 2018-08-15 PROCEDURE — 87624 HPV HI-RISK TYP POOLED RSLT: CPT

## 2018-08-15 RX ORDER — PAROXETINE 10 MG/1
10 TABLET, FILM COATED ORAL EVERY MORNING
Qty: 30 TABLET | Refills: 3 | Status: SHIPPED | OUTPATIENT
Start: 2018-08-15 | End: 2018-09-17

## 2018-08-15 ASSESSMENT — PATIENT HEALTH QUESTIONNAIRE - PHQ9
2. FEELING DOWN, DEPRESSED OR HOPELESS: 0
1. LITTLE INTEREST OR PLEASURE IN DOING THINGS: 0
SUM OF ALL RESPONSES TO PHQ QUESTIONS 1-9: 0
SUM OF ALL RESPONSES TO PHQ9 QUESTIONS 1 & 2: 0
1. LITTLE INTEREST OR PLEASURE IN DOING THINGS: 0
SUM OF ALL RESPONSES TO PHQ QUESTIONS 1-9: 0
SUM OF ALL RESPONSES TO PHQ QUESTIONS 1-9: 0
2. FEELING DOWN, DEPRESSED OR HOPELESS: 0
SUM OF ALL RESPONSES TO PHQ QUESTIONS 1-9: 0
SUM OF ALL RESPONSES TO PHQ9 QUESTIONS 1 & 2: 0

## 2018-08-15 NOTE — PROGRESS NOTES
Diabetes Mother     Stroke Mother     Heart Disease Mother     Heart Disease Father     Heart Disease Sister     Cancer Brother         lymph node and bone    Diabetes Brother     Hypertension Brother     Other Brother         bleeding ulcers     Social History     Social History    Marital status: Single     Spouse name: N/A    Number of children: N/A    Years of education: N/A     Occupational History    Not on file. Social History Main Topics    Smoking status: Former Smoker     Quit date: 3/1/2013    Smokeless tobacco: Never Used    Alcohol use No    Drug use: No    Sexual activity: Yes     Partners: Male     Birth control/ protection: Post-menopausal     Other Topics Concern    Not on file     Social History Narrative    No narrative on file       MEDICATIONS:  Current Outpatient Prescriptions   Medication Sig Dispense Refill    PARoxetine (PAXIL) 10 MG tablet Take 1 tablet by mouth every morning 30 tablet 3    meloxicam (MOBIC) 15 MG tablet Take 1 tablet by mouth daily 30 tablet 3    methylPREDNISolone (MEDROL DOSEPACK) 4 MG tablet Take by mouth. 1 kit 0    escitalopram (LEXAPRO) 10 MG tablet TAKE TWO TABLETS BY MOUTH DAILY 60 tablet 4    valACYclovir (VALTREX) 1 g tablet TAKE ONE TABLET BY MOUTH DAILY AS NEEDED 30 tablet 10    amLODIPine (NORVASC) 5 MG tablet TAKE ONE TABLET BY MOUTH DAILY 30 tablet 10    oxyCODONE-acetaminophen (PERCOCET) 7.5-325 MG per tablet       atorvastatin (LIPITOR) 40 MG tablet TAKE ONE TABLET BY MOUTH DAILY 30 tablet 5    topiramate (TOPAMAX) 50 MG tablet       topiramate (TOPAMAX) 100 MG tablet Take 150 mg by mouth 2 times daily        No current facility-administered medications for this visit.             ALLERGIES:  Allergies as of 08/15/2018 - Review Complete 08/15/2018   Allergen Reaction Noted    Codeine Nausea Only 06/25/2014       Symptoms of decreased mood absent    **If either question is answered in a  positive fashion then complete the Migraines, Epilepsy, Seizure Hx, or Limb Weakness  Dermatological ROS: No Rash, Itching, Hives, Mole Changes or Cancer                                                                                                                                                                                                                                  PHYSICAL Exam:     Constitutional:  Vitals:    08/15/18 1613   BP: 120/78   Site: Right Arm   Position: Sitting   Cuff Size: Medium Adult   Pulse: 78   Resp: 18   Weight: 172 lb (78 kg)   Height: 5' 9\" (1.753 m)       General Appearance: This  is a well Developed, well Nourished, well groomed female. Her BMI was reviewed. Nutritional decision making was discussed. Skin:  There was a Normal Inspection of the skin without rashes or lesions. There were no rashes. (Papular, Maculopapular, Hives, Pustular, Macular)     There were no lesions (Ulcers, Erythema, Abn. Appearing Nevi)            Lymphatic:  No Lymph Nodes were Palpable in the neck , axilla or groin. # Of Lymph Nodes; Location ; Character [Normal]  [Shotty] [Tender] [Enlarged]     Neck and EENT:  The neck was supple. There were no masses   The thyroid was not enlarged and had no masses. Perrla, EOMI B/L, TMI B/L No Abnormalities. Throat inspected-No exudates or Masses, Nares Patent No Masses        Respiratory: The lungs were auscultated and found to be clear. There were no rales, rhonchi or wheezes. There was a good respiratory effort. Cardiovascular: The heart was in a regular rate and rhythm. . No S3 or S4. There was no murmur appreciated. Location, grade, and radiation are not applicable. Extremities: The patients extremities were without calf tenderness, edema, or varicosities. There was full range of motion in all four extremities. Pulses in all four extremities were appreciated and are 2/4. Abdomen: The abdomen was soft and non-tender.  There were good bowel sounds in all quadrants and there was no guarding, rebound or rigidity. On evaluation there was no evidence of hepatosplenomegaly and there was no costal vertebral johnathan tenderness bilaterally. No hernias were appreciated. Abdominal Scars: ovarian abscess removed    Psych: The patient had a normal Orientation to: Time, Place, Person, and Situation  There is no Mood / Affect changes    Breast:  (Chest)  normal appearance, no masses or tenderness, Inspection negative  Self breast exams were reviewed in detail. Literature was given. Pelvic Exam:  Vulva and vagina appear normal. Bimanual exam reveals normal uterus and adnexa. Rectal Exam:  exam declined by patient          Musculosk:  Normal Gait and station was noted. Digits were evaluated without abnormal findings. Range of motion, stability and strength were evaluated and found to be appropriate for the patients age. [facility] Structural Component:  ASSESSMENT:      46 y.o. Annual   Diagnosis Orders   1. Well female exam with routine gynecological exam  [facility] DIGITAL SCREEN W CAD BILATERAL    PAP SMEAR   2. Special screening examination for human papillomavirus (HPV)  PAP SMEAR   3.  Screening mammogram, encounter for  [facility] DIGITAL SCREEN W CAD BILATERAL          Chief Complaint   Patient presents with    Annual Exam          Past Medical History:   Diagnosis Date    Abnormal Pap smear of cervix 1/17/2017    Anxiety 1/24/2017    Arthritis     left ankle, wears ankle brace    Carpal tunnel syndrome, bilateral     History of pneumonia 2013    HSV-1 (herpes simplex virus 1) infection 8/7/2017    Hyperlipidemia     Hypertension     Mood swings (HCC)     due to Menopause, On Lexapro    Obesity, unspecified     Urinary frequency     Vision abnormalities     wears glasses         Patient Active Problem List    Diagnosis Date Noted    Hypertension      Priority: High    History of tobacco abuse 05/22/2015     Priority: High     54 yr pack hx   Quit 2012      HTN (hypertension) 08/19/2014     Priority: High    S/P removal of right ovary      Priority: Medium     infection      Personal history of tobacco use, presenting hazards to health      Priority: Medium    Urinary frequency      Priority: Low    Carpal tunnel syndrome, bilateral      Priority: Low    Vision abnormalities      Priority: Low     glasses      History of ankle fracture 05/22/2015     Priority: Low    Carpal tunnel syndrome on right 10/06/2014     Priority: Low    Obesity      Priority: Low    HSV-1 (herpes simplex virus 1) infection 08/07/2017    HSV-2 seropositive 08/07/2017    Anxiety 01/24/2017    Caregiver stress 01/24/2017    Abnormal Pap smear of cervix 01/17/2017     ASCUS + HPV (other)  Colposcopy completed 1/7/17  PAP 7/2017      Obesity      Updating Deprecated Diagnoses      Arthritis      left ankle, wears ankle brace      Mood swings ([address]      due to Menopause, On Lexapro      Carpal tunnel syndrome, bilateral 11/18/2015    Status post carpal tunnel release of both wrists 11/18/2015    Hyperlipidemia 08/28/2015    History of pneumonia 01/01/2013          Hereditary Breast, Ovarian, Colon and Uterine Cancer screening Done. Tobacco & Secondary smoke risks reviewed; instructed on cessation and avoidance      Counseling Completed:  Preventative Health Recommendations and Follow up. The patient was informed of the recommended preventative health recommendations. 1. Annuals every year; Cytology collections per prevailing guidelines. 2. Mammograms begin every year at 35 yo if no abnormalities are found and no family     History. 3. Bone density studies every 2-3 years. Begin at 73 yo. If no fracture history or osteoporosis family history. (significant). 4. Colonoscopy begin at 49 yo. Repeat every ten years if negative and no family history. 5. Calcium of 0281-6972 mg/day in split dosing  6. Vitamin D 400-800 IU/day  7.  All other preventative health recommendations will be managed by the patients Primary care physician. PLAN:  Mammogram ordered  Dexa Scan ordered  PAP collected call for results  Lexapro discontinued and will start Paxil 10 mg po daily   Repeat Annual every 1 year  Cervical Cytology Evaluation begins at 24years old. If Negative Cytology, Follow-up screening per current guidelines. Mammograms every 1 year. If 37 yo and last mammogram was negative. Calcium and Vitamin D dosing reviewed. Colonoscopy screening reviewed as well as onset for bone density testing. Birth control and barrier recommendations discussed. STD counseling and prevention reviewed. Gardisil counseling completed for all patients 7-35 yo. Routine health maintenance per patients PCP. Orders Placed This Encounter   Procedures    CORAL DIGITAL SCREEN W CAD BILATERAL     Standing Status:   Future     Standing Expiration Date:   10/15/2019     Order Specific Question:   Reason for exam:     Answer:   SCREENING    PAP SMEAR     Patient History:    Patient's last menstrual period was 06/01/2012 (within weeks). OBGYN Status: Postmenopausal  Past Surgical History:  No date: APPENDECTOMY  10/06/14: CARPAL TUNNEL RELEASE Right  9/8/2015: CARPAL TUNNEL RELEASE Left  2015: COLONOSCOPY  1987: SALPINGO-OOPHORECTOMY Right      Comment: removed due to infection  No date: TONSILLECTOMY      Smoking status: Former Smoker                                                              Packs/day: 0.00      Years: 0.00         Quit date: 3/1/2013  Smokeless tobacco: Never Used                           Standing Status:   Future     Standing Expiration Date:   8/15/2019     Order Specific Question:   Collection Type     Answer: Thin Prep     Order Specific Question:   Prior Abnormal Pap Test     Answer:   No     Order Specific Question:   Screening or Diagnostic     Answer:   Screening     Order Specific Question:   HPV Requested?      Answer:   Yes     Order Specific Question: High Risk Patient     Answer:   N/A

## 2018-08-17 LAB
HPV SAMPLE: ABNORMAL
HPV SOURCE: ABNORMAL
HPV, GENOTYPE 16: NOT DETECTED
HPV, GENOTYPE 18: NOT DETECTED
HPV, HIGH RISK OTHER: DETECTED
HPV, INTERPRETATION: ABNORMAL

## 2018-08-23 ENCOUNTER — HOSPITAL ENCOUNTER (OUTPATIENT)
Dept: WOMENS IMAGING | Age: 53
Discharge: HOME | End: 2018-08-25
Payer: MEDICARE

## 2018-08-23 DIAGNOSIS — Z12.31 SCREENING MAMMOGRAM, ENCOUNTER FOR: ICD-10-CM

## 2018-08-23 PROCEDURE — 77063 BREAST TOMOSYNTHESIS BI: CPT

## 2018-09-04 PROBLEM — R87.613 HSIL (HIGH GRADE SQUAMOUS INTRAEPITHELIAL LESION) ON PAP SMEAR OF CERVIX: Status: ACTIVE | Noted: 2018-09-04

## 2018-09-05 ENCOUNTER — TELEPHONE (OUTPATIENT)
Dept: OBGYN CLINIC | Age: 53
End: 2018-09-05

## 2018-09-05 LAB — CYTOLOGY REPORT: NORMAL

## 2018-09-05 NOTE — TELEPHONE ENCOUNTER
----- Message from Slime Eucedaumacristobal Servin sent at 9/4/2018  4:37 PM EDT -----  Abnormal results  PAP HSIL  HPV detected (other)  Age 46  Please schedule patient for colposcopy with physician ASAP

## 2018-09-06 ENCOUNTER — TELEPHONE (OUTPATIENT)
Dept: OBGYN CLINIC | Age: 53
End: 2018-09-06

## 2018-09-06 NOTE — TELEPHONE ENCOUNTER
----- Message from Slime De Luna sent at 9/4/2018  4:37 PM EDT -----  Abnormal results  PAP HSIL  HPV detected (other)  Age 46  Please schedule patient for colposcopy with physician ASAP

## 2018-09-17 RX ORDER — ESCITALOPRAM OXALATE 10 MG/1
TABLET ORAL
Qty: 60 TABLET | Refills: 4 | Status: SHIPPED | OUTPATIENT
Start: 2018-09-17 | End: 2019-02-20 | Stop reason: SDUPTHER

## 2018-10-13 DIAGNOSIS — M77.8 SHOULDER TENDINITIS, LEFT: ICD-10-CM

## 2018-10-15 RX ORDER — MELOXICAM 15 MG/1
TABLET ORAL
Qty: 30 TABLET | Refills: 2 | Status: SHIPPED | OUTPATIENT
Start: 2018-10-15 | End: 2019-01-12 | Stop reason: SDUPTHER

## 2018-11-05 RX ORDER — AMLODIPINE BESYLATE 5 MG/1
TABLET ORAL
Qty: 30 TABLET | Refills: 0 | Status: SHIPPED | OUTPATIENT
Start: 2018-11-05 | End: 2018-11-14 | Stop reason: ALTCHOICE

## 2018-11-08 ENCOUNTER — PROCEDURE VISIT (OUTPATIENT)
Dept: OBGYN CLINIC | Age: 53
End: 2018-11-08
Payer: MEDICARE

## 2018-11-08 ENCOUNTER — HOSPITAL ENCOUNTER (OUTPATIENT)
Age: 53
Setting detail: SPECIMEN
Discharge: HOME | End: 2018-11-08
Payer: MEDICARE

## 2018-11-08 VITALS
HEIGHT: 69 IN | BODY MASS INDEX: 25.48 KG/M2 | DIASTOLIC BLOOD PRESSURE: 82 MMHG | HEART RATE: 84 BPM | SYSTOLIC BLOOD PRESSURE: 122 MMHG | WEIGHT: 172 LBS

## 2018-11-08 DIAGNOSIS — N88.2 CERVICAL STENOSIS (UTERINE CERVIX): ICD-10-CM

## 2018-11-08 DIAGNOSIS — B97.7 HPV IN FEMALE: ICD-10-CM

## 2018-11-08 DIAGNOSIS — R87.613 HSIL (HIGH GRADE SQUAMOUS INTRAEPITHELIAL LESION) ON PAP SMEAR OF CERVIX: Primary | ICD-10-CM

## 2018-11-08 DIAGNOSIS — Z00.00 PREVENTATIVE HEALTH CARE: ICD-10-CM

## 2018-11-08 DIAGNOSIS — R87.618 OTHER ABNORMAL CYTOLOGICAL FINDING OF SPECIMEN FROM CERVIX: ICD-10-CM

## 2018-11-08 PROCEDURE — 88305 TISSUE EXAM BY PATHOLOGIST: CPT

## 2018-11-08 PROCEDURE — 57456 ENDOCERV CURETTAGE W/SCOPE: CPT | Performed by: OBSTETRICS & GYNECOLOGY

## 2018-11-08 NOTE — PROGRESS NOTES
utilized on high and low magnification. A plain white light and green filter were  also implemented after 3% of acetic acid was applied to the cervix. There was no Aceto White Epithelium, Mosaic Changes, Punctation, or Irregular Vessels seen. ECC performed:  Yes    Lugols Iodine Applied:   No       IMPRESSIONS: Negative on Colposcopy  Biopsy sites: ECC      Assessment:   Diagnosis Orders   1. HSIL (high grade squamous intraepithelial lesion) on Pap smear of cervix  Specimen to Pathology Outpatient    WA COLPOSCOPY,CERVIX W/ADJ VAGINA, CURETTAG   2. 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 Other abnormal cytological finding of specimen from cervix     4. Preventative health care  Triglyceride    Lipid Panel   5. Cervical stenosis (uterine cervix)           PLAN:   1. The patient was given formal restriction guidelines. She is to RTO in 2 weeks. FOR PATIENTS WHO UNDERWENT A BIOPSY-They were instructed to report         any increase in vaginal bleeding, discharge, or any temperature more than      100.4   F. Strict pelvic rest precautions were reviewed with lifting restrictions. 2. If WNL Histopathology or Negative Colposcopic evaluation without Biopsies and/or ECC then Cytologic Collection/PAP in 6 months. 3. HPV Barrier Recommendations and STD counseling completed    4. Plan LEEP with colposcopy due to HSIL pap from [facility] in 2016, Negative 2017 And HSIL 2018 with + HPV    Orders Placed This Encounter   Procedures    Specimen to Pathology Outpatient     Standing Status:   Future     Standing Expiration Date:   11/8/2019     Order Specific Question:   PREVIOUS BIOPSY     Answer:   No     Order Specific Question:   PREOP DIAGNOSIS     Answer:   HSIL,+HPV     Order Specific Question:   FROZEN SECTION - NO OR YES/SPECIMEN     Answer:   No    Triglyceride     Standing Status:   Future     Standing Expiration Date:   3/8/2019     Order Specific Question:   Is Patient Fasting?      Answer:   YES     Order Specific

## 2018-11-12 ENCOUNTER — HOSPITAL ENCOUNTER (OUTPATIENT)
Age: 53
Setting detail: SPECIMEN
Discharge: HOME | End: 2018-11-12
Payer: MEDICARE

## 2018-11-12 DIAGNOSIS — Z00.00 PREVENTATIVE HEALTH CARE: ICD-10-CM

## 2018-11-12 LAB
CHOLESTEROL/HDL RATIO: 3.4
CHOLESTEROL: 131 MG/DL
HDLC SERPL-MCNC: 38 MG/DL
LDL CHOLESTEROL: 52 MG/DL (ref 0–130)
SURGICAL PATHOLOGY REPORT: NORMAL
TRIGL SERPL-MCNC: 203 MG/DL
VLDLC SERPL CALC-MCNC: ABNORMAL MG/DL (ref 1–30)

## 2018-11-14 ENCOUNTER — OFFICE VISIT (OUTPATIENT)
Dept: INTERNAL MEDICINE CLINIC | Age: 53
End: 2018-11-14
Payer: MEDICARE

## 2018-11-14 ENCOUNTER — HOSPITAL ENCOUNTER (OUTPATIENT)
Age: 53
Setting detail: SPECIMEN
Discharge: HOME | End: 2018-11-14
Payer: MEDICARE

## 2018-11-14 VITALS
BODY MASS INDEX: 27.58 KG/M2 | WEIGHT: 182 LBS | HEIGHT: 68 IN | OXYGEN SATURATION: 97 % | HEART RATE: 83 BPM | SYSTOLIC BLOOD PRESSURE: 132 MMHG | DIASTOLIC BLOOD PRESSURE: 80 MMHG | RESPIRATION RATE: 20 BRPM

## 2018-11-14 DIAGNOSIS — R45.86 MOOD SWINGS: ICD-10-CM

## 2018-11-14 DIAGNOSIS — E78.5 HYPERLIPIDEMIA, UNSPECIFIED HYPERLIPIDEMIA TYPE: ICD-10-CM

## 2018-11-14 DIAGNOSIS — F32.A ANXIETY AND DEPRESSION: ICD-10-CM

## 2018-11-14 DIAGNOSIS — I10 HTN, GOAL BELOW 130/80: ICD-10-CM

## 2018-11-14 DIAGNOSIS — Z00.00 PREVENTATIVE HEALTH CARE: ICD-10-CM

## 2018-11-14 DIAGNOSIS — F41.9 ANXIETY AND DEPRESSION: ICD-10-CM

## 2018-11-14 DIAGNOSIS — Z13.1 DIABETES MELLITUS SCREENING: ICD-10-CM

## 2018-11-14 DIAGNOSIS — I10 HTN, GOAL BELOW 130/80: Primary | ICD-10-CM

## 2018-11-14 DIAGNOSIS — F17.200 CURRENT SMOKER: ICD-10-CM

## 2018-11-14 LAB
ALBUMIN SERPL-MCNC: 4.5 G/DL (ref 3.5–5.2)
ALBUMIN/GLOBULIN RATIO: 1.6 (ref 1–2.5)
ALP BLD-CCNC: 66 U/L (ref 35–104)
ALT SERPL-CCNC: 15 U/L (ref 5–33)
ANION GAP SERPL CALCULATED.3IONS-SCNC: 11 MMOL/L (ref 9–17)
AST SERPL-CCNC: 12 U/L
BILIRUB SERPL-MCNC: 0.3 MG/DL (ref 0.3–1.2)
BUN BLDV-MCNC: 17 MG/DL (ref 6–20)
BUN/CREAT BLD: NORMAL (ref 9–20)
CALCIUM SERPL-MCNC: 9.6 MG/DL (ref 8.6–10.4)
CHLORIDE BLD-SCNC: 107 MMOL/L (ref 98–107)
CO2: 24 MMOL/L (ref 20–31)
CREAT SERPL-MCNC: 0.65 MG/DL (ref 0.5–0.9)
ESTIMATED AVERAGE GLUCOSE: 108 MG/DL
GFR AFRICAN AMERICAN: >60 ML/MIN
GFR NON-AFRICAN AMERICAN: >60 ML/MIN
GFR SERPL CREATININE-BSD FRML MDRD: NORMAL ML/MIN/{1.73_M2}
GFR SERPL CREATININE-BSD FRML MDRD: NORMAL ML/MIN/{1.73_M2}
GLUCOSE BLD-MCNC: 95 MG/DL (ref 70–99)
HBA1C MFR BLD: 5.4 % (ref 4–6)
HCT VFR BLD CALC: 46.4 % (ref 36.3–47.1)
HEMOGLOBIN: 15 G/DL (ref 11.9–15.1)
MCH RBC QN AUTO: 31.6 PG (ref 25.2–33.5)
MCHC RBC AUTO-ENTMCNC: 32.3 G/DL (ref 28.4–34.8)
MCV RBC AUTO: 97.7 FL (ref 82.6–102.9)
NRBC AUTOMATED: 0 PER 100 WBC
PDW BLD-RTO: 13.2 % (ref 11.8–14.4)
PLATELET # BLD: 322 K/UL (ref 138–453)
PMV BLD AUTO: 10.5 FL (ref 8.1–13.5)
POTASSIUM SERPL-SCNC: 4.5 MMOL/L (ref 3.7–5.3)
RBC # BLD: 4.75 M/UL (ref 3.95–5.11)
SODIUM BLD-SCNC: 142 MMOL/L (ref 135–144)
TOTAL PROTEIN: 7.3 G/DL (ref 6.4–8.3)
TSH SERPL DL<=0.05 MIU/L-ACNC: 2.69 MIU/L (ref 0.3–5)
WBC # BLD: 10.8 K/UL (ref 3.5–11.3)

## 2018-11-14 PROCEDURE — 4004F PT TOBACCO SCREEN RCVD TLK: CPT | Performed by: NURSE PRACTITIONER

## 2018-11-14 PROCEDURE — 99214 OFFICE O/P EST MOD 30 MIN: CPT | Performed by: NURSE PRACTITIONER

## 2018-11-14 PROCEDURE — G8417 CALC BMI ABV UP PARAM F/U: HCPCS | Performed by: NURSE PRACTITIONER

## 2018-11-14 PROCEDURE — 3017F COLORECTAL CA SCREEN DOC REV: CPT | Performed by: NURSE PRACTITIONER

## 2018-11-14 PROCEDURE — G8484 FLU IMMUNIZE NO ADMIN: HCPCS | Performed by: NURSE PRACTITIONER

## 2018-11-14 PROCEDURE — G8427 DOCREV CUR MEDS BY ELIG CLIN: HCPCS | Performed by: NURSE PRACTITIONER

## 2018-11-14 RX ORDER — MEDICAL SUPPLY, MISCELLANEOUS
EACH MISCELLANEOUS
Qty: 1 EACH | Refills: 0 | Status: SHIPPED | OUTPATIENT
Start: 2018-11-14 | End: 2019-08-08 | Stop reason: ALTCHOICE

## 2018-11-14 RX ORDER — ARIPIPRAZOLE 2 MG/1
2 TABLET ORAL DAILY
Qty: 30 TABLET | Refills: 1 | Status: SHIPPED | OUTPATIENT
Start: 2018-11-14 | End: 2019-01-12 | Stop reason: SDUPTHER

## 2018-11-14 ASSESSMENT — ENCOUNTER SYMPTOMS
WHEEZING: 0
CONSTIPATION: 0
COUGH: 1
ABDOMINAL PAIN: 0
TROUBLE SWALLOWING: 0
DIARRHEA: 0
RHINORRHEA: 0
NAUSEA: 0
EYE PAIN: 0
VOICE CHANGE: 0
BACK PAIN: 1
SHORTNESS OF BREATH: 0

## 2018-11-14 NOTE — PROGRESS NOTES
Obesity    Personal history of tobacco use, presenting hazards to health    HTN (hypertension)    Carpal tunnel syndrome on right    History of tobacco abuse    History of ankle fracture    Hypertension    Urinary frequency    Carpal tunnel syndrome, bilateral    Vision abnormalities    S/P removal of right ovary    Hyperlipidemia    Carpal tunnel syndrome, bilateral    Status post carpal tunnel release of both wrists    Obesity    Arthritis    Mood swings    History of pneumonia    Abnormal Pap smear of cervix    Anxiety    Caregiver stress    HSV-1 (herpes simplex virus 1) infection    HSV-2 seropositive    HSIL (high grade squamous intraepithelial lesion) on Pap smear of cervix    Cervical stenosis (uterine cervix)    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)       Allergies   Allergen Reactions    Codeine Nausea Only         MEDICATIONS:     Current Outpatient Prescriptions   Medication Sig Dispense Refill    Blood Pressure Monitoring (B-D ASSURE BPM/AUTO ARM CUFF) MISC Check BP daily. 1 each 0    ARIPiprazole (ABILIFY) 2 MG tablet Take 1 tablet by mouth daily 30 tablet 1    meloxicam (MOBIC) 15 MG tablet TAKE ONE TABLET BY MOUTH DAILY 30 tablet 2    escitalopram (LEXAPRO) 10 MG tablet TAKE TWO TABLETS BY MOUTH DAILY 60 tablet 4    valACYclovir (VALTREX) 1 g tablet TAKE ONE TABLET BY MOUTH DAILY AS NEEDED 30 tablet 10    oxyCODONE-acetaminophen (PERCOCET) 7.5-325 MG per tablet       atorvastatin (LIPITOR) 40 MG tablet TAKE ONE TABLET BY MOUTH DAILY 30 tablet 5    topiramate (TOPAMAX) 100 MG tablet Take 200 mg by mouth daily        No current facility-administered medications for this visit. SOCIAL HISTORY    Reviewed and updated. Jaelyn Sugey  reports that she has been smoking. She has been smoking about 1.00 pack per day. She has never used smokeless tobacco.    FAMILY HISTORY:    Reviewed and updated.   family history includes Cancer in her brother; Diabetes in her brother and mother; Heart Disease in her father, mother, and sister; High Blood Pressure in her mother; Hypertension in her brother; Other in her brother; Stroke in her mother. REVIEW OF SYSTEMS:      Review of Systems   Constitutional: Negative for activity change, chills and fever. HENT: Negative for congestion, postnasal drip, rhinorrhea, trouble swallowing and voice change. Eyes: Negative for pain and visual disturbance. Respiratory: Positive for cough. Negative for shortness of breath and wheezing. Cardiovascular: Negative for chest pain, palpitations and leg swelling. Gastrointestinal: Negative for abdominal pain, constipation, diarrhea and nausea. Endocrine: Negative for cold intolerance and heat intolerance. Musculoskeletal: Positive for arthralgias and back pain. Negative for gait problem. Neurological: Negative for dizziness and headaches. Psychiatric/Behavioral: Positive for agitation and sleep disturbance. The patient is nervous/anxious. PHYSICAL EXAM:      Vitals:    11/14/18 0951   BP: 132/80   Pulse: 83   Resp: 20   SpO2: 97%   Weight: 182 lb (82.6 kg)   Height: 5' 8\" (1.727 m)     BP Readings from Last 3 Encounters:   11/14/18 132/80   11/08/18 122/82   08/15/18 120/78      Physical Exam   Constitutional: She is oriented to person, place, and time. She appears well-developed and well-nourished. No distress. HENT:   Head: Normocephalic and atraumatic. Right Ear: External ear normal.   Left Ear: External ear normal.   Nose: Nose normal.   Mouth/Throat: Oropharynx is clear and moist.   Eyes: Pupils are equal, round, and reactive to light. Conjunctivae are normal. Right eye exhibits no discharge. Left eye exhibits no discharge. Neck: Normal range of motion. Neck supple. No thyromegaly present. Cardiovascular: Normal rate, regular rhythm and normal heart sounds. No murmur heard. Pulmonary/Chest: Effort normal and breath sounds normal. She has no wheezes. Abdominal: Soft.  Bowel sounds are

## 2019-01-12 DIAGNOSIS — R45.86 MOOD SWINGS: ICD-10-CM

## 2019-01-12 DIAGNOSIS — F32.A ANXIETY AND DEPRESSION: ICD-10-CM

## 2019-01-12 DIAGNOSIS — M77.8 SHOULDER TENDINITIS, LEFT: ICD-10-CM

## 2019-01-12 DIAGNOSIS — F41.9 ANXIETY AND DEPRESSION: ICD-10-CM

## 2019-01-14 RX ORDER — MELOXICAM 15 MG/1
TABLET ORAL
Qty: 30 TABLET | Refills: 1 | Status: SHIPPED | OUTPATIENT
Start: 2019-01-14 | End: 2019-03-15 | Stop reason: SDUPTHER

## 2019-01-15 RX ORDER — ARIPIPRAZOLE 2 MG/1
TABLET ORAL
Qty: 30 TABLET | Refills: 0 | Status: SHIPPED | OUTPATIENT
Start: 2019-01-15 | End: 2019-02-12 | Stop reason: SDUPTHER

## 2019-02-20 DIAGNOSIS — B00.1 RECURRENT COLD SORES: ICD-10-CM

## 2019-02-20 RX ORDER — ESCITALOPRAM OXALATE 10 MG/1
TABLET ORAL
Qty: 60 TABLET | Refills: 3 | Status: SHIPPED | OUTPATIENT
Start: 2019-02-20 | End: 2019-06-24 | Stop reason: SDUPTHER

## 2019-02-21 RX ORDER — VALACYCLOVIR HYDROCHLORIDE 1 G/1
TABLET, FILM COATED ORAL
Qty: 30 TABLET | Refills: 9 | Status: SHIPPED | OUTPATIENT
Start: 2019-02-21 | End: 2019-12-23 | Stop reason: SDUPTHER

## 2019-03-01 DIAGNOSIS — E78.5 HYPERLIPIDEMIA: ICD-10-CM

## 2019-03-01 RX ORDER — ATORVASTATIN CALCIUM 40 MG/1
TABLET, FILM COATED ORAL
Qty: 30 TABLET | Refills: 9 | Status: SHIPPED | OUTPATIENT
Start: 2019-03-01 | End: 2019-08-08 | Stop reason: ALTCHOICE

## 2019-03-15 DIAGNOSIS — M77.8 SHOULDER TENDINITIS, LEFT: ICD-10-CM

## 2019-03-15 RX ORDER — MELOXICAM 15 MG/1
TABLET ORAL
Qty: 30 TABLET | Refills: 0 | Status: SHIPPED | OUTPATIENT
Start: 2019-03-15 | End: 2019-04-20 | Stop reason: SDUPTHER

## 2019-04-16 DIAGNOSIS — F32.A ANXIETY AND DEPRESSION: ICD-10-CM

## 2019-04-16 DIAGNOSIS — F41.9 ANXIETY AND DEPRESSION: ICD-10-CM

## 2019-04-16 DIAGNOSIS — R45.86 MOOD SWINGS: ICD-10-CM

## 2019-04-17 RX ORDER — ARIPIPRAZOLE 2 MG/1
TABLET ORAL
Qty: 30 TABLET | Refills: 6 | Status: SHIPPED | OUTPATIENT
Start: 2019-04-17 | End: 2019-08-08 | Stop reason: ALTCHOICE

## 2019-04-20 DIAGNOSIS — M77.8 SHOULDER TENDINITIS, LEFT: ICD-10-CM

## 2019-04-22 RX ORDER — MELOXICAM 15 MG/1
TABLET ORAL
Qty: 30 TABLET | Refills: 2 | Status: SHIPPED | OUTPATIENT
Start: 2019-04-22 | End: 2020-07-27

## 2019-06-24 RX ORDER — ESCITALOPRAM OXALATE 10 MG/1
TABLET ORAL
Qty: 60 TABLET | Refills: 2 | Status: SHIPPED | OUTPATIENT
Start: 2019-06-24 | End: 2019-08-08 | Stop reason: ALTCHOICE

## 2019-06-24 NOTE — TELEPHONE ENCOUNTER
Patient is requesting refill on Lexapro, annual due in August. Initially prescribed on 2/20/2019 for menopausal symptoms and mood swings.

## 2019-08-08 ENCOUNTER — OFFICE VISIT (OUTPATIENT)
Dept: INTERNAL MEDICINE CLINIC | Age: 54
End: 2019-08-08
Payer: MEDICARE

## 2019-08-08 VITALS
BODY MASS INDEX: 28.49 KG/M2 | HEIGHT: 68 IN | WEIGHT: 188 LBS | DIASTOLIC BLOOD PRESSURE: 90 MMHG | SYSTOLIC BLOOD PRESSURE: 146 MMHG

## 2019-08-08 DIAGNOSIS — E66.3 OVERWEIGHT (BMI 25.0-29.9): ICD-10-CM

## 2019-08-08 DIAGNOSIS — E78.5 HYPERLIPIDEMIA LDL GOAL <100: ICD-10-CM

## 2019-08-08 DIAGNOSIS — F17.200 CURRENT EVERY DAY SMOKER: ICD-10-CM

## 2019-08-08 DIAGNOSIS — I10 HTN, GOAL BELOW 130/80: Primary | ICD-10-CM

## 2019-08-08 PROCEDURE — G8417 CALC BMI ABV UP PARAM F/U: HCPCS | Performed by: NURSE PRACTITIONER

## 2019-08-08 PROCEDURE — 99214 OFFICE O/P EST MOD 30 MIN: CPT | Performed by: NURSE PRACTITIONER

## 2019-08-08 PROCEDURE — G8427 DOCREV CUR MEDS BY ELIG CLIN: HCPCS | Performed by: NURSE PRACTITIONER

## 2019-08-08 PROCEDURE — 3017F COLORECTAL CA SCREEN DOC REV: CPT | Performed by: NURSE PRACTITIONER

## 2019-08-08 PROCEDURE — 4004F PT TOBACCO SCREEN RCVD TLK: CPT | Performed by: NURSE PRACTITIONER

## 2019-08-08 RX ORDER — AMLODIPINE BESYLATE 5 MG/1
5 TABLET ORAL DAILY
Qty: 30 TABLET | Refills: 3 | Status: SHIPPED | OUTPATIENT
Start: 2019-08-08 | End: 2019-12-04 | Stop reason: SDUPTHER

## 2019-08-08 ASSESSMENT — ENCOUNTER SYMPTOMS
NAUSEA: 0
VOMITING: 0
COUGH: 1
BACK PAIN: 1
EYE PAIN: 0
SHORTNESS OF BREATH: 0
WHEEZING: 0

## 2019-08-08 ASSESSMENT — PATIENT HEALTH QUESTIONNAIRE - PHQ9
2. FEELING DOWN, DEPRESSED OR HOPELESS: 0
SUM OF ALL RESPONSES TO PHQ QUESTIONS 1-9: 0
1. LITTLE INTEREST OR PLEASURE IN DOING THINGS: 0
SUM OF ALL RESPONSES TO PHQ QUESTIONS 1-9: 0
SUM OF ALL RESPONSES TO PHQ9 QUESTIONS 1 & 2: 0

## 2019-08-08 NOTE — PROGRESS NOTES
questions answered. Pt voiced understanding. · Patient given educational materials - see patient instructions    Reyes Counts, APRN - [facility]    8/8/2019, 10:49 AM    Please note that this chart was generated using voice recognition Dragon dictation software. Although every effort was made to ensure the accuracy of this automatedtranscription, some errors in transcription may have occurred.

## 2019-09-03 ENCOUNTER — TELEPHONE (OUTPATIENT)
Dept: INTERNAL MEDICINE CLINIC | Age: 54
End: 2019-09-03

## 2019-09-03 ENCOUNTER — OFFICE VISIT (OUTPATIENT)
Dept: INTERNAL MEDICINE CLINIC | Age: 54
End: 2019-09-03
Payer: MEDICARE

## 2019-09-03 ENCOUNTER — HOSPITAL ENCOUNTER (OUTPATIENT)
Dept: ULTRASOUND IMAGING | Age: 54
Discharge: HOME | End: 2019-09-05
Payer: MEDICARE

## 2019-09-03 VITALS
OXYGEN SATURATION: 97 % | BODY MASS INDEX: 28.34 KG/M2 | WEIGHT: 187 LBS | DIASTOLIC BLOOD PRESSURE: 68 MMHG | SYSTOLIC BLOOD PRESSURE: 134 MMHG | HEART RATE: 70 BPM | HEIGHT: 68 IN

## 2019-09-03 DIAGNOSIS — F17.200 CURRENT EVERY DAY SMOKER: ICD-10-CM

## 2019-09-03 DIAGNOSIS — R10.13 EPIGASTRIC PAIN: ICD-10-CM

## 2019-09-03 DIAGNOSIS — I10 HTN, GOAL BELOW 130/80: Primary | ICD-10-CM

## 2019-09-03 DIAGNOSIS — R10.811 RIGHT UPPER QUADRANT ABDOMINAL TENDERNESS, REBOUND TENDERNESS PRESENCE NOT SPECIFIED: ICD-10-CM

## 2019-09-03 PROCEDURE — G8427 DOCREV CUR MEDS BY ELIG CLIN: HCPCS | Performed by: NURSE PRACTITIONER

## 2019-09-03 PROCEDURE — 4004F PT TOBACCO SCREEN RCVD TLK: CPT | Performed by: NURSE PRACTITIONER

## 2019-09-03 PROCEDURE — G8417 CALC BMI ABV UP PARAM F/U: HCPCS | Performed by: NURSE PRACTITIONER

## 2019-09-03 PROCEDURE — 76705 ECHO EXAM OF ABDOMEN: CPT

## 2019-09-03 PROCEDURE — 3017F COLORECTAL CA SCREEN DOC REV: CPT | Performed by: NURSE PRACTITIONER

## 2019-09-03 PROCEDURE — 99214 OFFICE O/P EST MOD 30 MIN: CPT | Performed by: NURSE PRACTITIONER

## 2019-09-03 RX ORDER — PANTOPRAZOLE SODIUM 40 MG/1
40 TABLET, DELAYED RELEASE ORAL
Qty: 30 TABLET | Refills: 0 | Status: SHIPPED | OUTPATIENT
Start: 2019-09-03 | End: 2019-10-03 | Stop reason: SDUPTHER

## 2019-09-03 RX ORDER — CALCIUM CARBONATE 200(500)MG
1 TABLET,CHEWABLE ORAL 4 TIMES DAILY PRN
Qty: 100 TABLET | Refills: 1 | Status: SHIPPED | OUTPATIENT
Start: 2019-09-03 | End: 2019-12-10 | Stop reason: SDUPTHER

## 2019-09-03 NOTE — PATIENT INSTRUCTIONS
Smoking can make GERD worse. If you need help quitting, talk to your doctor about stop-smoking programs and medicines. These can increase your chances of quitting for good. · If you have GERD symptoms at night, raise the head of your bed 6 to 8 inches by putting the frame on blocks or placing a foam wedge under the head of your mattress. (Adding extra pillows does not work.)  · Do not wear tight clothing around your middle. · Lose weight if you need to. Losing just 5 to 10 pounds can help. When should you call for help? Call your doctor now or seek immediate medical care if:    · You have new or different belly pain.     · Your stools are black and tarlike or have streaks of blood.    Watch closely for changes in your health, and be sure to contact your doctor if:    · Your symptoms have not improved after 2 days.     · Food seems to catch in your throat or chest.   Where can you learn more? Go to https://GigSocial.Eviti. org and sign in to your ADOR account. Enter R555 in the Regene[employer] box to learn more about \"Gastroesophageal Reflux Disease (GERD): Care Instructions. \"     If you do not have an account, please click on the \"Sign Up Now\" link. Current as of: November 7, 2018  Content Version: 12.1  © 2801-5310 [employer], Incorporated. Care instructions adapted under license by [facility] ([facility]). If you have questions about a medical condition or this instruction, always ask your healthcare professional. [place] [address] any warranty or liability for your use of this information.

## 2019-09-03 NOTE — PROGRESS NOTES
1965            Patient Care Team:  ASHA Locke CNP as PCP - General (Internal Medicine)  ASHA Locke CNP as PCP - REHABILITATION [facility] Empaneled Provider  Maco Orona DO as Consulting Physician (Obstetrics & Gynecology)    REASON FOR VISIT: Abdominal Pain (for the last 2 weeks, abdominal cramping upper left side after eating )        HISTORY OF PRESENT ILLNESS:      History was obtained from the patient. Keny Reagan is a 53 y.o. is here for follow up of HTN and for new problem of abd pain which started 2 weeks ago and has been worsening. She rates the pain as 8/10, mostly in the epigastric region, worse with eating any food, worse with lying flat. Pain is cramping, aching, burning and aching with greasy food. No fever, chills, or vomiting, does have vague nausea intermittently. States she drinks 1/2 cup of coffee daily, current every day smoker. Uses Motrin frequently. Family history of gall bladder disease. She has not tried anything for the pain.      Patient Active Problem List   Diagnosis    Obesity    Personal history of tobacco use, presenting hazards to health    HTN (hypertension)    Carpal tunnel syndrome on right    History of tobacco abuse    History of ankle fracture    Hypertension    Urinary frequency    Carpal tunnel syndrome, bilateral    Vision abnormalities    S/P removal of right ovary    Hyperlipidemia    Carpal tunnel syndrome, bilateral    Status post carpal tunnel release of both wrists    Obesity    Arthritis    Mood swings    History of pneumonia    Abnormal Pap smear of cervix    Anxiety    Caregiver stress    HSV-1 (herpes simplex virus 1) infection    HSV-2 seropositive    HSIL (high grade squamous intraepithelial lesion) on Pap smear of cervix    Cervical stenosis (uterine cervix)    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 Genitourinary: Negative for difficulty urinating, dysuria, flank pain and hematuria. Musculoskeletal: Positive for arthralgias and back pain. Neurological: Negative for dizziness and headaches. PHYSICAL EXAM:      Vitals:    09/03/19 1442   BP: 134/68   Pulse: 70   SpO2: 97%   Weight: 187 lb (84.8 kg)   Height: 5' 7.99\" (1.727 m)     BP Readings from Last 3 Encounters:   09/03/19 134/68   08/08/19 (!) 146/90   11/14/18 132/80        Physical Exam   Constitutional: She is oriented to person, place, and time. She appears well-developed and well-nourished. No distress. HENT:   Head: Normocephalic and atraumatic. Mouth/Throat: Oropharynx is clear and moist.   Eyes: Pupils are equal, round, and reactive to light. Conjunctivae are normal. Right eye exhibits no discharge. Left eye exhibits no discharge. Neck: Normal range of motion. Cardiovascular: Normal rate, regular rhythm and normal heart sounds. No murmur heard. Pulmonary/Chest: Effort normal and breath sounds normal. She has no wheezes. Abdominal: Soft. Normal appearance and bowel sounds are normal. She exhibits no distension and no mass. There is tenderness in the right upper quadrant and epigastric area. There is no rigidity, no rebound and no CVA tenderness. Musculoskeletal:        Thoracic back: She exhibits normal range of motion and no tenderness. Lumbar back: She exhibits decreased range of motion. She exhibits no tenderness. Lymphadenopathy:     She has no cervical adenopathy. Neurological: She is alert and oriented to person, place, and time. Coordination normal.   Psychiatric: She has a normal mood and affect.  Her behavior is normal.        LABORATORY FINDINGS:    CBC:   Lab Results   Component Value Date    WBC 8.9 09/04/2019    HGB 15.9 09/04/2019     09/04/2019     BMP:   Lab Results   Component Value Date     11/14/2018    K 4.5 11/14/2018

## 2019-09-04 ENCOUNTER — HOSPITAL ENCOUNTER (OUTPATIENT)
Age: 54
Discharge: HOME | End: 2019-09-04
Payer: MEDICARE

## 2019-09-04 DIAGNOSIS — I10 HTN, GOAL BELOW 130/80: ICD-10-CM

## 2019-09-04 DIAGNOSIS — E66.3 OVERWEIGHT (BMI 25.0-29.9): ICD-10-CM

## 2019-09-04 LAB
ALBUMIN SERPL-MCNC: 4.5 G/DL (ref 3.5–5.2)
ALBUMIN/GLOBULIN RATIO: NORMAL (ref 1–2.5)
ALP BLD-CCNC: 73 U/L (ref 35–104)
ALT SERPL-CCNC: 18 U/L (ref 5–33)
ANION GAP SERPL CALCULATED.3IONS-SCNC: 13 MMOL/L (ref 9–17)
AST SERPL-CCNC: 15 U/L
BILIRUB SERPL-MCNC: 0.35 MG/DL (ref 0.3–1.2)
BUN BLDV-MCNC: 20 MG/DL (ref 6–20)
BUN/CREAT BLD: NORMAL (ref 9–20)
CALCIUM SERPL-MCNC: 9.8 MG/DL (ref 8.6–10.4)
CHLORIDE BLD-SCNC: 106 MMOL/L (ref 98–107)
CHOLESTEROL/HDL RATIO: 4
CHOLESTEROL: 148 MG/DL
CO2: 21 MMOL/L (ref 20–31)
CREAT SERPL-MCNC: 0.79 MG/DL (ref 0.5–0.9)
ESTIMATED AVERAGE GLUCOSE: 117 MG/DL
GFR AFRICAN AMERICAN: >60 ML/MIN
GFR NON-AFRICAN AMERICAN: >60 ML/MIN
GFR SERPL CREATININE-BSD FRML MDRD: NORMAL ML/MIN/{1.73_M2}
GFR SERPL CREATININE-BSD FRML MDRD: NORMAL ML/MIN/{1.73_M2}
GLUCOSE BLD-MCNC: 87 MG/DL (ref 70–99)
HBA1C MFR BLD: 5.7 % (ref 4–6)
HCT VFR BLD CALC: 48.1 % (ref 36–46)
HDLC SERPL-MCNC: 37 MG/DL
HEMOGLOBIN: 15.9 G/DL (ref 12–16)
LDL CHOLESTEROL: 73 MG/DL (ref 0–130)
MCH RBC QN AUTO: 31 PG (ref 26–34)
MCHC RBC AUTO-ENTMCNC: 33.1 G/DL (ref 31–37)
MCV RBC AUTO: 93.5 FL (ref 80–100)
NRBC AUTOMATED: ABNORMAL PER 100 WBC
PDW BLD-RTO: 14.1 % (ref 11.5–14.9)
PLATELET # BLD: 277 K/UL (ref 150–450)
PMV BLD AUTO: 8.5 FL (ref 6–12)
POTASSIUM SERPL-SCNC: 4 MMOL/L (ref 3.7–5.3)
RBC # BLD: 5.14 M/UL (ref 4–5.2)
SODIUM BLD-SCNC: 140 MMOL/L (ref 135–144)
TOTAL PROTEIN: 7.3 G/DL (ref 6.4–8.3)
TRIGL SERPL-MCNC: 190 MG/DL
TSH SERPL DL<=0.05 MIU/L-ACNC: 3.07 MIU/L (ref 0.3–5)
VLDLC SERPL CALC-MCNC: ABNORMAL MG/DL (ref 1–30)
WBC # BLD: 8.9 K/UL (ref 3.5–11)

## 2019-09-04 PROCEDURE — 80061 LIPID PANEL: CPT

## 2019-09-04 PROCEDURE — 36415 COLL VENOUS BLD VENIPUNCTURE: CPT

## 2019-09-04 PROCEDURE — 84443 ASSAY THYROID STIM HORMONE: CPT

## 2019-09-04 PROCEDURE — 80053 COMPREHEN METABOLIC PANEL: CPT

## 2019-09-04 PROCEDURE — 85027 COMPLETE CBC AUTOMATED: CPT

## 2019-09-04 PROCEDURE — 83036 HEMOGLOBIN GLYCOSYLATED A1C: CPT

## 2019-09-04 ASSESSMENT — ENCOUNTER SYMPTOMS
WHEEZING: 0
TROUBLE SWALLOWING: 0
CONSTIPATION: 0
VOMITING: 0
EYE REDNESS: 0
NAUSEA: 1
COUGH: 1
SORE THROAT: 0
ABDOMINAL DISTENTION: 0
VOICE CHANGE: 0
DIARRHEA: 0
BACK PAIN: 1
ABDOMINAL PAIN: 1
SHORTNESS OF BREATH: 0

## 2019-09-10 ENCOUNTER — HOSPITAL ENCOUNTER (OUTPATIENT)
Dept: WOMENS IMAGING | Age: 54
Discharge: HOME | End: 2019-09-12
Payer: MEDICARE

## 2019-09-10 DIAGNOSIS — Z12.31 BREAST CANCER SCREENING BY MAMMOGRAM: ICD-10-CM

## 2019-09-10 PROCEDURE — 77063 BREAST TOMOSYNTHESIS BI: CPT

## 2019-10-03 ENCOUNTER — OFFICE VISIT (OUTPATIENT)
Dept: INTERNAL MEDICINE CLINIC | Age: 54
End: 2019-10-03
Payer: MEDICARE

## 2019-10-03 VITALS
BODY MASS INDEX: 28.64 KG/M2 | WEIGHT: 189 LBS | DIASTOLIC BLOOD PRESSURE: 70 MMHG | HEIGHT: 68 IN | SYSTOLIC BLOOD PRESSURE: 132 MMHG

## 2019-10-03 DIAGNOSIS — K21.9 GASTROESOPHAGEAL REFLUX DISEASE, ESOPHAGITIS PRESENCE NOT SPECIFIED: Primary | ICD-10-CM

## 2019-10-03 PROBLEM — R73.03 PREDIABETES: Status: ACTIVE | Noted: 2019-10-03

## 2019-10-03 PROCEDURE — 4004F PT TOBACCO SCREEN RCVD TLK: CPT | Performed by: NURSE PRACTITIONER

## 2019-10-03 PROCEDURE — G8484 FLU IMMUNIZE NO ADMIN: HCPCS | Performed by: NURSE PRACTITIONER

## 2019-10-03 PROCEDURE — G8427 DOCREV CUR MEDS BY ELIG CLIN: HCPCS | Performed by: NURSE PRACTITIONER

## 2019-10-03 PROCEDURE — G8417 CALC BMI ABV UP PARAM F/U: HCPCS | Performed by: NURSE PRACTITIONER

## 2019-10-03 PROCEDURE — 3017F COLORECTAL CA SCREEN DOC REV: CPT | Performed by: NURSE PRACTITIONER

## 2019-10-03 PROCEDURE — 99213 OFFICE O/P EST LOW 20 MIN: CPT | Performed by: NURSE PRACTITIONER

## 2019-10-03 RX ORDER — PANTOPRAZOLE SODIUM 40 MG/1
40 TABLET, DELAYED RELEASE ORAL
Qty: 30 TABLET | Refills: 0 | Status: SHIPPED | OUTPATIENT
Start: 2019-10-03 | End: 2019-11-06 | Stop reason: SDUPTHER

## 2019-10-03 ASSESSMENT — ENCOUNTER SYMPTOMS
EYE REDNESS: 0
NAUSEA: 0
SHORTNESS OF BREATH: 0
CONSTIPATION: 0
WHEEZING: 0
COUGH: 0
EYE DISCHARGE: 0
ABDOMINAL PAIN: 0

## 2019-11-03 DIAGNOSIS — K21.9 GASTROESOPHAGEAL REFLUX DISEASE, ESOPHAGITIS PRESENCE NOT SPECIFIED: ICD-10-CM

## 2019-11-04 RX ORDER — PANTOPRAZOLE SODIUM 40 MG/1
TABLET, DELAYED RELEASE ORAL
Qty: 30 TABLET | Refills: 1 | OUTPATIENT
Start: 2019-11-04

## 2019-11-06 RX ORDER — PANTOPRAZOLE SODIUM 40 MG/1
40 TABLET, DELAYED RELEASE ORAL
Qty: 30 TABLET | Refills: 0 | Status: SHIPPED | OUTPATIENT
Start: 2019-11-06 | End: 2020-01-10

## 2019-12-10 ENCOUNTER — OFFICE VISIT (OUTPATIENT)
Dept: INTERNAL MEDICINE CLINIC | Age: 54
End: 2019-12-10
Payer: MEDICARE

## 2019-12-10 VITALS
BODY MASS INDEX: 29.25 KG/M2 | HEIGHT: 68 IN | OXYGEN SATURATION: 94 % | WEIGHT: 193 LBS | HEART RATE: 73 BPM | RESPIRATION RATE: 16 BRPM | DIASTOLIC BLOOD PRESSURE: 64 MMHG | SYSTOLIC BLOOD PRESSURE: 124 MMHG

## 2019-12-10 DIAGNOSIS — E66.09 CLASS 2 OBESITY DUE TO EXCESS CALORIES WITHOUT SERIOUS COMORBIDITY WITH BODY MASS INDEX (BMI) OF 39.0 TO 39.9 IN ADULT: ICD-10-CM

## 2019-12-10 DIAGNOSIS — R10.13 EPIGASTRIC PAIN: ICD-10-CM

## 2019-12-10 DIAGNOSIS — K21.9 GASTROESOPHAGEAL REFLUX DISEASE, ESOPHAGITIS PRESENCE NOT SPECIFIED: Primary | ICD-10-CM

## 2019-12-10 DIAGNOSIS — I10 ESSENTIAL HYPERTENSION: ICD-10-CM

## 2019-12-10 PROCEDURE — 99214 OFFICE O/P EST MOD 30 MIN: CPT | Performed by: INTERNAL MEDICINE

## 2019-12-10 RX ORDER — FAMOTIDINE 20 MG/1
20 TABLET, FILM COATED ORAL 2 TIMES DAILY
Qty: 60 TABLET | Refills: 3 | Status: SHIPPED | OUTPATIENT
Start: 2019-12-10 | End: 2020-01-10

## 2019-12-10 RX ORDER — PANTOPRAZOLE SODIUM 40 MG/1
40 TABLET, DELAYED RELEASE ORAL
Qty: 30 TABLET | Refills: 0 | Status: CANCELLED | OUTPATIENT
Start: 2019-12-10

## 2019-12-10 RX ORDER — CALCIUM CARBONATE 200(500)MG
1 TABLET,CHEWABLE ORAL 4 TIMES DAILY PRN
Qty: 100 TABLET | Refills: 1 | Status: SHIPPED | OUTPATIENT
Start: 2019-12-10 | End: 2020-01-09

## 2019-12-23 DIAGNOSIS — B00.1 RECURRENT COLD SORES: ICD-10-CM

## 2019-12-30 RX ORDER — VALACYCLOVIR HYDROCHLORIDE 1 G/1
TABLET, FILM COATED ORAL
Qty: 30 TABLET | Refills: 9 | Status: SHIPPED | OUTPATIENT
Start: 2019-12-30 | End: 2020-10-22

## 2020-01-09 ENCOUNTER — TELEPHONE (OUTPATIENT)
Dept: GASTROENTEROLOGY | Age: 55
End: 2020-01-09

## 2020-01-09 NOTE — TELEPHONE ENCOUNTER
Patient LVM for procedure schedulers, stating her doctor ordered her an EGD and she needs to get this scheduled  Patient has requested a return call

## 2020-01-10 ENCOUNTER — TELEPHONE (OUTPATIENT)
Dept: INTERNAL MEDICINE CLINIC | Age: 55
End: 2020-01-10

## 2020-01-10 RX ORDER — PANTOPRAZOLE SODIUM 20 MG/1
20 TABLET, DELAYED RELEASE ORAL DAILY
Qty: 30 TABLET | Refills: 11 | Status: SHIPPED | OUTPATIENT
Start: 2020-01-10 | End: 2021-01-05

## 2020-01-10 NOTE — TELEPHONE ENCOUNTER
Writer returned pt's phone call and spoke to pt over phone in regards to scheduling. Writer informed pt she would need an office visit w/ doctor first before scheduling an EGD and writer also informed pt we do not have a referral from her pcp and that we would need to have referral first before scheduling.   Pt will call her pcp's ofc and have them fax over referral.

## 2020-01-10 NOTE — TELEPHONE ENCOUNTER
Patient called back and the famotidine is on back order and the OTC Pepcid does not work. She used to be on Protonix. Can she can a months supply of this until the Famotidine can be filled or please advise?

## 2020-02-07 RX ORDER — ATORVASTATIN CALCIUM 40 MG/1
40 TABLET, FILM COATED ORAL DAILY
Qty: 30 TABLET | Refills: 5 | Status: SHIPPED | OUTPATIENT
Start: 2020-02-07 | End: 2020-05-21 | Stop reason: SDUPTHER

## 2020-02-07 NOTE — TELEPHONE ENCOUNTER
Medication:  Atorvastatin   Last visit: 10/3/2019  Next visit: Visit date not found  Last refill: 3/18  [facility]

## 2020-03-05 RX ORDER — AMLODIPINE BESYLATE 5 MG/1
TABLET ORAL
Qty: 30 TABLET | Refills: 1 | Status: SHIPPED | OUTPATIENT
Start: 2020-03-05 | End: 2020-05-04

## 2020-03-19 ENCOUNTER — TELEPHONE (OUTPATIENT)
Dept: INTERNAL MEDICINE CLINIC | Age: 55
End: 2020-03-19

## 2020-03-25 PROBLEM — G56.03 CARPAL TUNNEL SYNDROME, BILATERAL: Status: RESOLVED | Noted: 2020-03-25 | Resolved: 2020-03-24

## 2020-05-04 RX ORDER — AMLODIPINE BESYLATE 5 MG/1
TABLET ORAL
Qty: 30 TABLET | Refills: 0 | Status: SHIPPED | OUTPATIENT
Start: 2020-05-04 | End: 2020-05-21 | Stop reason: SDUPTHER

## 2020-05-21 RX ORDER — AMLODIPINE BESYLATE 5 MG/1
5 TABLET ORAL DAILY
Qty: 90 TABLET | Refills: 3 | Status: SHIPPED | OUTPATIENT
Start: 2020-05-21 | End: 2021-05-25 | Stop reason: SDUPTHER

## 2020-05-21 RX ORDER — ATORVASTATIN CALCIUM 40 MG/1
40 TABLET, FILM COATED ORAL DAILY
Qty: 90 TABLET | Refills: 3 | Status: SHIPPED | OUTPATIENT
Start: 2020-05-21 | End: 2021-06-01

## 2020-05-21 NOTE — TELEPHONE ENCOUNTER
Medication: atorvastatin  amlodipine  Last visit: 12/10/19  Rose Grewal  Next visit: Visit date not found  Last refill:   [facility]

## 2020-06-05 RX ORDER — AMLODIPINE BESYLATE 5 MG/1
TABLET ORAL
Qty: 30 TABLET | Refills: 0 | OUTPATIENT
Start: 2020-06-05

## 2020-07-09 ENCOUNTER — TELEPHONE (OUTPATIENT)
Dept: INTERNAL MEDICINE CLINIC | Age: 55
End: 2020-07-09

## 2020-07-15 ENCOUNTER — TELEPHONE (OUTPATIENT)
Dept: OBGYN CLINIC | Age: 55
End: 2020-07-15

## 2020-07-15 NOTE — TELEPHONE ENCOUNTER
Patient would like to discuss past HPV results, and has other concerns.     She can be reached @ [phone]

## 2020-07-15 NOTE — TELEPHONE ENCOUNTER
Patient call returned. Patient questioning history of HPV virus. Patient advised that she was recommended to follow up in 2018 for colposcopy results and further plan of care. Patient states she was not aware she needed a follow up appointment. Patient advised she should schedule appointment for annual exam asap, to establish care for 2020 with office, and to follow up related to history of abnormal pap with colposcopy. Patient last seen 2018. Call sent to  staff for scheduling accordingly.

## 2020-07-27 ENCOUNTER — TELEPHONE (OUTPATIENT)
Dept: OBGYN CLINIC | Age: 55
End: 2020-07-27

## 2020-07-27 ENCOUNTER — OFFICE VISIT (OUTPATIENT)
Dept: OBGYN CLINIC | Age: 55
End: 2020-07-27
Payer: MEDICARE

## 2020-07-27 ENCOUNTER — HOSPITAL ENCOUNTER (OUTPATIENT)
Age: 55
Setting detail: SPECIMEN
Discharge: HOME | End: 2020-07-27
Payer: MEDICARE

## 2020-07-27 VITALS
HEART RATE: 91 BPM | TEMPERATURE: 98.2 F | WEIGHT: 211 LBS | DIASTOLIC BLOOD PRESSURE: 82 MMHG | RESPIRATION RATE: 18 BRPM | BODY MASS INDEX: 31.25 KG/M2 | HEIGHT: 69 IN | SYSTOLIC BLOOD PRESSURE: 122 MMHG

## 2020-07-27 LAB
DIRECT EXAM: NORMAL
HEPATITIS B SURFACE ANTIGEN: NONREACTIVE
HEPATITIS C ANTIBODY: REACTIVE
HIV AG/AB: NONREACTIVE
Lab: NORMAL
SPECIMEN DESCRIPTION: NORMAL
T. PALLIDUM, IGG: NONREACTIVE

## 2020-07-27 PROCEDURE — 86694 HERPES SIMPLEX NES ANTBDY: CPT

## 2020-07-27 PROCEDURE — 87389 HIV-1 AG W/HIV-1&-2 AB AG IA: CPT

## 2020-07-27 PROCEDURE — 87660 TRICHOMONAS VAGIN DIR PROBE: CPT

## 2020-07-27 PROCEDURE — 87480 CANDIDA DNA DIR PROBE: CPT

## 2020-07-27 PROCEDURE — 87624 HPV HI-RISK TYP POOLED RSLT: CPT

## 2020-07-27 PROCEDURE — 88175 CYTOPATH C/V AUTO FLUID REDO: CPT

## 2020-07-27 PROCEDURE — 87591 N.GONORRHOEAE DNA AMP PROB: CPT

## 2020-07-27 PROCEDURE — 86780 TREPONEMA PALLIDUM: CPT

## 2020-07-27 PROCEDURE — 87340 HEPATITIS B SURFACE AG IA: CPT

## 2020-07-27 PROCEDURE — 36415 COLL VENOUS BLD VENIPUNCTURE: CPT

## 2020-07-27 PROCEDURE — 87510 GARDNER VAG DNA DIR PROBE: CPT

## 2020-07-27 PROCEDURE — 86696 HERPES SIMPLEX TYPE 2 TEST: CPT

## 2020-07-27 PROCEDURE — 86803 HEPATITIS C AB TEST: CPT

## 2020-07-27 PROCEDURE — 87491 CHLMYD TRACH DNA AMP PROBE: CPT

## 2020-07-27 PROCEDURE — 99396 PREV VISIT EST AGE 40-64: CPT | Performed by: NURSE PRACTITIONER

## 2020-07-27 PROCEDURE — 86695 HERPES SIMPLEX TYPE 1 TEST: CPT

## 2020-07-27 ASSESSMENT — PATIENT HEALTH QUESTIONNAIRE - PHQ9
SUM OF ALL RESPONSES TO PHQ9 QUESTIONS 1 & 2: 0
2. FEELING DOWN, DEPRESSED OR HOPELESS: 0
SUM OF ALL RESPONSES TO PHQ QUESTIONS 1-9: 0
SUM OF ALL RESPONSES TO PHQ QUESTIONS 1-9: 0
1. LITTLE INTEREST OR PLEASURE IN DOING THINGS: 0

## 2020-07-27 NOTE — TELEPHONE ENCOUNTER
Per Isiah Dobson CNP, follow up testing ordered for reactive hepatitis C antibody. Patient aware of results and instructions to complete ordered blood work as soon as possible.

## 2020-07-27 NOTE — PROGRESS NOTES
Mother     Diabetes Mother     Stroke Mother     Heart Disease Mother     Heart Disease Father     Heart Disease Sister     Cancer Brother         lymph node and bone    Diabetes Brother     Hypertension Brother     Other Brother         bleeding ulcers     Social History     Socioeconomic History    Marital status: Single     Spouse name: Not on file    Number of children: Not on file    Years of education: Not on file    Highest education level: Not on file   Occupational History    Not on file   Social Needs    Financial resource strain: Not on file    Food insecurity     Worry: Not on file     Inability: Not on file    Transportation needs     Medical: Not on file     Non-medical: Not on file   Tobacco Use    Smoking status: Current Every Day Smoker     Packs/day: 2.00     Years: 43.00     Pack years: 86.00     Types: Cigarettes     Start date: 8/8/1976    Smokeless tobacco: Never Used    Tobacco comment: has slowed down to .75 pack per day   Substance and Sexual Activity    Alcohol use: No     Alcohol/week: 0.0 standard drinks    Drug use: No    Sexual activity: Yes     Partners: Male     Birth control/protection: Post-menopausal   Lifestyle    Physical activity     Days per week: Not on file     Minutes per session: Not on file    Stress: Not on file   Relationships    Social connections     Talks on phone: Not on file     Gets together: Not on file     Attends [employer] service: Not on file     Active member of club or organization: Not on file     Attends meetings of clubs or organizations: Not on file     Relationship status: Not on file    Intimate partner violence     Fear of current or ex partner: Not on file     Emotionally abused: Not on file     Physically abused: Not on file     Forced sexual activity: Not on file   Other Topics Concern    Not on file   Social History Narrative    Not on file       MEDICATIONS:  Current Outpatient Medications   Medication Sig Dispense Refill    amLODIPine (NORVASC) 5 MG tablet Take 1 tablet by mouth daily 90 tablet 3    atorvastatin (LIPITOR) 40 MG tablet Take 1 tablet by mouth daily 90 tablet 3    pantoprazole (PROTONIX) 20 MG tablet Take 1 tablet by mouth daily 30 tablet 11    valACYclovir (VALTREX) 1 g tablet TAKE ONE TABLET BY MOUTH DAILY AS NEEDED 30 tablet 9    Blood Pressure Monitoring (MICROLIFE DELUXE BP MONITOR) KIT 1 each by Does not apply route daily 1 kit 0    meloxicam (MOBIC) 15 MG tablet TAKE ONE TABLET BY MOUTH DAILY 30 tablet 2    oxyCODONE-acetaminophen (PERCOCET) 7.5-325 MG per tablet       topiramate (TOPAMAX) 100 MG tablet Take 200 mg by mouth daily        No current facility-administered medications for this visit. ALLERGIES:  Allergies as of 07/27/2020 - Review Complete 12/10/2019   Allergen Reaction Noted    Codeine Nausea Only 06/25/2014       Symptoms of decreased mood absent  Symptoms of anhedonia absent    **If either question is answered in a  positive fashion then complete the PHQ9 Scoring Evaluation and make the appropriate referral**      Immunization status: stated as current, but no records available. Gynecologic History:  Menarche: 6 yo  Menopause at 52 yo     Patient's last menstrual period was 06/01/2012 (within weeks). Sexually Active: Yes    STD History: Yes HSV 1     Permanent Sterilization: No   Reversible Birth Control: No        Hormone Replacement Exposure: No      Genetic Qualified Family History of Breast, Ovarian , Colon or Uterine Cancer: No     If YES see scanned worksheet.     Preventative Health Testing:    Health Maintenance:  Health Maintenance Due   Topic Date Due    Pneumococcal 0-64 years Vaccine (1 of 1 - PPSV23) 05/06/1970    DTaP/Tdap/Td vaccine (1 - Tdap) 05/06/1983    Low dose CT lung screening  05/06/2019    Annual Wellness Visit (AWV)  06/20/2019    Cervical cancer screen  08/15/2019    Colon cancer screen colonoscopy  08/15/2019       Date of Last Pap Smear: 08/15/2018 HSIL/ + HPV  Abnormal Pap Smear History: HSIL +HOV  Colposcopy History: 11/8/2018 colp neg  Date of Last Mammogram: 09/10/2019 neg  Date of Last Colonoscopy:   Date of Last Bone Density:      ________________________________________________________________________        REVIEW OF SYSTEMS:    yes   A minimum of an eleven point review of systems was completed. Review Of Systems (11 point):  Constitutional: No fever, chills or malaise; No weight change or fatigue  Head and Eyes: No vision, Headache, Dizziness or trauma in last 12 months  ENT ROS: No hearing, Tinnitis, sinus or taste problems  Hematological and Lymphatic ROS:No Lymphoma, Von Willebrand's, Hemophillia or Bleeding History  Psych ROS: No Depression, Homicidal thoughts,suicidal thoughts, or anxiety  Breast ROS: No prior breast abnormalities or lumps  Respiratory ROS: No SOB, Pneumoniae,Cough, or Pulmonary Embolism History  Cardiovascular ROS: No Chest Pain with Exertion, Palpitations, Syncope, Edema, Arrhythmia  Gastrointestinal ROS: No Indigestion, Heartburn, Nausea, vomiting, Diarrhea, Constipation,or Bowel Changes; No Bloody Stools or melena  Genito-Urinary ROS: No Dysuria, Hematuria or Nocturia. No Urinary Incontinence or Vaginal Discharge  Musculoskeletal ROS: No Arthralgia, Arthritis,Gout,Osteoporosis or Rheumatism  Neurological ROS: No CVA, Migraines, Epilepsy, Seizure Hx, or Limb Weakness  Dermatological ROS: No Rash, Itching, Hives, Mole Changes or Cancer                                                                                                                                                                                                                                  PHYSICAL Exam:     Constitutional:  There were no vitals filed for this visit. General Appearance: This  is a well Developed, well Nourished, well groomed female. Her BMI was reviewed.  Nutritional decision making was discussed. Skin:  There was a Normal Inspection of the skin without rashes or lesions. There were no rashes. (Papular, Maculopapular, Hives, Pustular, Macular)     There were no lesions (Ulcers, Erythema, Abn. Appearing Nevi)            Lymphatic:  No Lymph Nodes were Palpable in the neck , axilla or groin.  0 # Of Lymph Nodes; Location ; Character [Normal]  [Shotty] [Tender] [Enlarged]     Neck and EENT:  The neck was supple. There were no masses   The thyroid was not enlarged and had no masses. Perrla, EOMI B/L, TMI B/L No Abnormalities. Throat inspected-No exudates or Masses, Nares Patent No Masses        Respiratory: The lungs were auscultated and found to be clear. There were no rales, rhonchi or wheezes. There was a good respiratory effort. Cardiovascular: The heart was in a regular rate and rhythm. . No S3 or S4. There was no murmur appreciated. Location, grade, and radiation are not applicable. Extremities: The patients extremities were without calf tenderness, edema, or varicosities. There was full range of motion in all four extremities. Pulses in all four extremities were appreciated and are 2/4. Abdomen: The abdomen was soft and non-tender. There were good bowel sounds in all quadrants and there was no guarding, rebound or rigidity. On evaluation there was no evidence of hepatosplenomegaly and there was no costal vertebral johnathan tenderness bilaterally. No hernias were appreciated. Abdominal Scars: intact    Psych: The patient had a normal Orientation to: Time, Place, Person, and Situation  There is no Mood / Affect changes    Breast:  (Chest)  normal appearance, no masses or tenderness  Self breast exams were reviewed in detail. Literature was given. Pelvic Exam:  Vulva and vagina appear normal. Bimanual exam reveals normal uterus and adnexa. Rectal Exam:  exam declined by patient          Musculosk:  Normal Gait and station was noted.   Digits were evaluated without Colposcopy-done      Obesity      Updating Deprecated Diagnoses      Arthritis      left ankle, wears ankle brace      Mood swings      due to Menopause, On Lexapro      Carpal tunnel syndrome, bilateral 11/18/2015    Status post carpal tunnel release of both wrists 11/18/2015    Hyperlipidemia 08/28/2015    History of pneumonia 01/01/2013          Hereditary Breast, Ovarian, Colon and Uterine Cancer screening Done. Tobacco & Secondary smoke risks reviewed; instructed on cessation and avoidance      Counseling Completed:  Preventative Health Recommendations and Follow up. The patient was informed of the recommended preventative health recommendations. 1. Annuals every year; Cytology collections per prevailing guidelines. 2. Mammograms begin every year at 35 yo if no abnormalities are found and no family history. 3. Bone density studies every 2-3 years. Begin at 71 yo. If no fracture history or osteoporosis family history. (significant). 4. Colonoscopy begin at 38 yo. Repeat every ten years if negative and no family history. 5. Calcium of 3793-6882 mg/day in split dosing  6. Vitamin D 400-800 IU/day  7. All other preventative health recommendations will be managed by the patients Primary care physician. PLAN:  Return in about 1 year (around 7/27/2021) for annual.   Pap smear collected  Vaginal cultures  Lab slip given  Mammogram ordered  Colonoscopy ordered  Repeat Annual every 1 year  Cervical Cytology Evaluation begins at 24years old. If Negative Cytology, Follow-up screening per current guidelines. Mammograms every 1 year. If 35 yo and last mammogram was negative. Calcium and Vitamin D dosing reviewed. Colonoscopy screening reviewed as well as onset for bone density testing. Birth control and barrier recommendations discussed. STD counseling and prevention reviewed. Gardisil counseling completed for all patients 10-35 yo.   Routine health maintenance per patients PCP.  No orders of the defined types were placed in this encounter.

## 2020-07-27 NOTE — TELEPHONE ENCOUNTER
----- Message from ASHA Benitez CNP sent at 7/27/2020  3:39 PM EDT -----  Reactive hep C  Patient needs HCV RNA by PCR ordered.

## 2020-07-28 ENCOUNTER — HOSPITAL ENCOUNTER (OUTPATIENT)
Age: 55
Setting detail: SPECIMEN
Discharge: HOME | End: 2020-07-28
Payer: MEDICARE

## 2020-07-28 ENCOUNTER — TELEPHONE (OUTPATIENT)
Dept: OBGYN CLINIC | Age: 55
End: 2020-07-28

## 2020-07-28 LAB
C TRACH DNA GENITAL QL NAA+PROBE: NEGATIVE
HERPES SIMPLEX VIRUS 1 IGG: 6.26
HERPES SIMPLEX VIRUS 2 IGG: 4.9
HERPES TYPE 1/2 IGM COMBINED: 0.51
N. GONORRHOEAE DNA: NEGATIVE
SPECIMEN DESCRIPTION: NORMAL

## 2020-07-28 PROCEDURE — 87522 HEPATITIS C REVRS TRNSCRPJ: CPT

## 2020-07-28 PROCEDURE — 36415 COLL VENOUS BLD VENIPUNCTURE: CPT

## 2020-07-28 NOTE — TELEPHONE ENCOUNTER
----- Message from ASHA Benitez CNP sent at 7/28/2020  1:59 PM EDT -----  +HSV type I and II with no recent or active outbreak.   If patient currently having symptoms and has never been treated, send in script for valtrex 1 gram po BID x 10 days

## 2020-07-29 ENCOUNTER — OFFICE VISIT (OUTPATIENT)
Dept: INTERNAL MEDICINE CLINIC | Age: 55
End: 2020-07-29
Payer: MEDICARE

## 2020-07-29 VITALS
DIASTOLIC BLOOD PRESSURE: 74 MMHG | RESPIRATION RATE: 16 BRPM | WEIGHT: 212 LBS | SYSTOLIC BLOOD PRESSURE: 124 MMHG | TEMPERATURE: 98.2 F | HEIGHT: 69 IN | BODY MASS INDEX: 31.4 KG/M2 | HEART RATE: 88 BPM

## 2020-07-29 DIAGNOSIS — Z12.11 COLON CANCER SCREENING: Primary | ICD-10-CM

## 2020-07-29 LAB
DIRECT EXAM: NORMAL
Lab: NORMAL
SPECIMEN DESCRIPTION: NORMAL

## 2020-07-29 PROCEDURE — G0438 PPPS, INITIAL VISIT: HCPCS | Performed by: NURSE PRACTITIONER

## 2020-07-29 PROCEDURE — 3017F COLORECTAL CA SCREEN DOC REV: CPT | Performed by: NURSE PRACTITIONER

## 2020-07-29 PROCEDURE — G0009 ADMIN PNEUMOCOCCAL VACCINE: HCPCS | Performed by: NURSE PRACTITIONER

## 2020-07-29 PROCEDURE — 90732 PPSV23 VACC 2 YRS+ SUBQ/IM: CPT | Performed by: NURSE PRACTITIONER

## 2020-07-29 PROCEDURE — G0296 VISIT TO DETERM LDCT ELIG: HCPCS | Performed by: NURSE PRACTITIONER

## 2020-07-29 RX ORDER — ALBUTEROL SULFATE 90 UG/1
2 AEROSOL, METERED RESPIRATORY (INHALATION) EVERY 4 HOURS PRN
Qty: 1 INHALER | Refills: 1 | Status: SHIPPED | OUTPATIENT
Start: 2020-07-29 | End: 2020-12-01

## 2020-07-29 RX ORDER — VARENICLINE TARTRATE
KIT
Qty: 1 BOX | Refills: 0 | Status: SHIPPED
Start: 2020-07-29 | End: 2020-09-10 | Stop reason: SINTOL

## 2020-07-29 ASSESSMENT — PATIENT HEALTH QUESTIONNAIRE - PHQ9
SUM OF ALL RESPONSES TO PHQ9 QUESTIONS 1 & 2: 0
2. FEELING DOWN, DEPRESSED OR HOPELESS: 0
1. LITTLE INTEREST OR PLEASURE IN DOING THINGS: 0
SUM OF ALL RESPONSES TO PHQ QUESTIONS 1-9: 0

## 2020-07-29 ASSESSMENT — LIFESTYLE VARIABLES: HOW OFTEN DO YOU HAVE A DRINK CONTAINING ALCOHOL: 0

## 2020-07-29 NOTE — PROGRESS NOTES
Visit Information    Have you changed or started any medications since your last visit including any over-the-counter medicines, vitamins, or herbal medicines? no   Are you having any side effects from any of your medications? -  no  Have you stopped taking any of your medications? Is so, why? -  no    Have you seen any other physician or provider since your last visit? Yes - Records Obtained  Have you had any other diagnostic tests since your last visit? Yes - Records Obtained  Have you been seen in the emergency room and/or had an admission to a hospital since we last saw you? No  Have you had your routine dental cleaning in the past 6 months? yes -     Have you activated your [employer] account? If not, what are your barriers? No     Patient Care Team:  ASHA Benedict CNP as PCP - General (Internal Medicine)  ASHA Benedict CNP as PCP - [facility] Empaneled Provider  Gonzalez Real DO as Consulting Physician (Obstetrics & Gynecology)    Medical History Review  Past Medical, Family, and Social History reviewed and does contribute to the patient presenting condition    Health Maintenance   Topic Date Due    DTaP/Tdap/Td vaccine (1 - Tdap) 05/06/1983    Low dose CT lung screening  05/06/2019    Annual Wellness Visit (AWV)  06/20/2019    Cervical cancer screen  08/15/2019    Colon cancer screen colonoscopy  08/15/2019    Shingles Vaccine (1 of 2) 08/08/2020 (Originally 5/6/2014)    Flu vaccine (1) 09/01/2020    A1C test (Diabetic or Prediabetic)  09/04/2020    Lipid screen  09/04/2020    Breast cancer screen  09/10/2021    Pneumococcal 0-64 years Vaccine  Completed    Hepatitis C screen  Completed    HIV screen  Completed    Hepatitis A vaccine  Aged Out    Hepatitis B vaccine  Aged Out    Hib vaccine  Aged Out    Meningococcal (ACWY) vaccine  Aged Out     Chief Complaint   Patient presents with    Medicare AWV     Pt is here for medicare annual wellness.    [facility]  History of pneumonia 2013    HSV-1 (herpes simplex virus 1) infection 8/7/2017    Hyperlipidemia     Hypertension     Mood swings     due to Menopause, On Lexapro    Obesity, unspecified     Urinary frequency     Vision abnormalities     wears glasses       Past Surgical History:   Procedure Laterality Date    APPENDECTOMY      CARPAL TUNNEL RELEASE Right 10/06/14    CARPAL TUNNEL RELEASE Left 9/8/2015    COLONOSCOPY  2015    SALPINGO-OOPHORECTOMY Right 1987    removed due to infection    TONSILLECTOMY           Family History   Problem Relation Age of Onset    High Blood Pressure Mother     Diabetes Mother     Stroke Mother     Heart Disease Mother     Heart Disease Father     Heart Disease Sister     Cancer Brother         lymph node and bone    Diabetes Brother     Hypertension Brother     Other Brother         bleeding ulcers       CareTeam (Including outside providers/suppliers regularly involved in providing care):   Patient Care Team:  ASHA Doan CNP as PCP - General (Internal Medicine)  ASHA Doan CNP [facility] PCP - [facility] Empaneled Provider  Oscar Cardenas DO as Consulting Physician (Obstetrics & Gynecology)    Wt Readings from Last 3 Encounters:   07/29/20 212 lb (96.2 kg)   07/27/20 211 lb (95.7 kg)   12/10/19 193 lb (87.5 kg)     Vitals:    07/29/20 1424   BP: 124/74   Site: Left Upper Arm   Position: Sitting   Cuff Size: Large Adult   Pulse: 88   Resp: 16   Temp: 98.2 °F (36.8 °C)   Weight: 212 lb (96.2 kg)   Height: 5' 9\" (1.753 m)     Body mass index is 31.31 kg/m². Based upon direct observation of the patient, evaluation of cognition reveals recent and remote memory intact.     General Appearance: alert and oriented to person, place and time, well developed and well- nourished, obese, in no acute distress  Skin: warm and dry, no rash or erythema  Head: normocephalic and atraumatic  Eyes: pupils equal, round, and reactive to light, extraocular eye movements intact, conjunctivae normal  ENT: tympanic membrane, external ear and ear canal normal bilaterally, nose without deformity, nasal mucosa and turbinates normal without polyps  Neck: supple and non-tender without mass, no thyromegaly or thyroid nodules, no cervical lymphadenopathy  Pulmonary/Chest: clear to auscultation bilaterally- occasional faint wheeze, no rales or rhonchi, normal air movement, no respiratory distress  Cardiovascular: normal rate, regular rhythm, no murmur, distal pulses intact, no carotid bruits  Abdomen: soft, non-tender, non-distended, normal bowel sounds, no masses or organomegaly  Extremities: no cyanosis, clubbing or edema  Musculoskeletal: normal range of motion, no joint swelling, deformity or tenderness  Neurologic: reflexes normal and symmetric, no cranial nerve deficit, gait, coordination and speech normal    Patient's complete Health Risk Assessment and screening values have been reviewed and are found in Flowsheets. The following problems were reviewed today and where indicated follow up appointments were made and/or referrals ordered.     Positive Risk Factor Screenings with Interventions:     Substance Abuse:  Social History     Tobacco History     Smoking Status  Current Every Day Smoker Smoking Start Date  8/8/1976 Smoking Frequency  2 packs/day for 43 years (80 pk yrs) Smoking Tobacco Type  Cigarettes    Smokeless Tobacco Use  Never Used    Tobacco Comment  has slowed down to .75 pack per day          Alcohol History     Alcohol Use Status  No          Drug Use     Drug Use Status  No          Sexual Activity     Sexually Active  Yes Partners  Male Birth Control/Protection  Post-menopausal               Audit Questionnaire: Screen for Alcohol Misuse  How often do you have a drink containing alcohol?: Never  Substance Abuse Interventions:  · Tobacco abuse:  patient agrees to try the following tobacco cessation strategies:  Chantix: risks and benefits of this medication discussed- patient will call for any significant adverse effects    General Health:  General  In general, how would you say your health is?: Good  In the past 7 days, have you experienced any of the following? New or Increased Pain, New or Increased Fatigue, Loneliness, Social Isolation, Stress or Anger?: (!) Stress  Do you get the social and emotional support that you need?: Yes  Do you have a Living Will?: (!) No  General Health Risk Interventions:  · Stress: patient's comments regarding reasons for stress and/or anger: break up after 3 year relationship, patient declines any further evaluation/treatment for this issue  · No Living Will: patient declined    Health Habits/Nutrition:  Health Habits/Nutrition  Do you exercise for at least 20 minutes 2-3 times per week?: Yes  Have you lost any weight without trying in the past 3 months?: No  Do you eat fewer than 2 meals per day?: No  Have you seen a dentist within the past year?: (!) No  Body mass index is 31.31 kg/m².   Health Habits/Nutrition Interventions:  · Dental exam overdue:  patient declines dental evaluation    Hearing/Vision:  No exam data present  Hearing/Vision  Do you or your family notice any trouble with your hearing?: No  Do you have difficulty driving, watching TV, or doing any of your daily activities because of your eyesight?: No  Have you had an eye exam within the past year?: (!) No  Hearing/Vision Interventions:  · Vision concerns:  patient declines any further evaluation/treatment for this issue    Personalized Preventive Plan   Current Health Maintenance Status  Immunization History   Administered Date(s) Administered    Pneumococcal Polysaccharide ([id number]) 07/29/2020        Health Maintenance   Topic Date Due    DTaP/Tdap/Td vaccine (1 - Tdap) 05/06/1983    Low dose CT lung screening  05/06/2019    Annual Wellness Visit (AWV)  06/20/2019    Cervical cancer screen  08/15/2019    Colon cancer screen colonoscopy  08/15/2019    Shingles Vaccine (1 of 2) 08/08/2020 (Originally 5/6/2014)    Flu vaccine (1) 09/01/2020    A1C test (Diabetic or Prediabetic)  09/04/2020    Lipid screen  09/04/2020    Breast cancer screen  09/10/2021    Pneumococcal 0-64 years Vaccine  Completed    Hepatitis C screen  Completed    HIV screen  Completed    Hepatitis A vaccine  Aged Out    Hepatitis B vaccine  Aged Out    Hib vaccine  Aged Out    Meningococcal (ACWY) vaccine  Aged Out     Recommendations for All Campus Due: see orders and patient instructions/AVS.  . Recommended screening schedule for the next 5-10 years is provided to the patient in written form: see Patient Instructions/AVS.    Cara Lugo [facility] and health maintenance. Diagnoses and all orders for this visit:    Routine general medical examination at a health care facility  -     CBC; Future  -     Comprehensive Metabolic Panel; Future  -     Lipid Panel; Future  -     TSH with Reflex; Future  -     Urinalysis; Future  -     Hemoglobin A1C; Future    Personal history of tobacco use  -     HI VISIT TO DISCUSS LUNG CA SCREEN W LDCT  -     CT Lung Screen (Annual); Future    Current every day smoker  -     varenicline (CHANTIX STARTING MONTH PAK) 0.5 MG X 11 & 1 MG X 42 tablet; Take by mouth.  -     Full PFT Study With Bronchodilator; Future    Shortness of breath  -     albuterol sulfate HFA (VENTOLIN HFA) 108 (90 Base) MCG/ACT inhaler; Inhale 2 puffs into the lungs every 4 hours as needed for Wheezing or Shortness of Breath  -     Full PFT Study With Bronchodilator;  Future    Need for 23-polyvalent pneumococcal polysaccharide vaccine  -     Pneumococcal polysaccharide vaccine 23-valent greater than or equal to 1yo subcutaneous/IM                  Low Dose CT (LDCT) Lung Screening criteria met   Age 55-77   Pack year smoking >30   Still smoking or less than 15 year since quit   No sign or symptoms of lung cancer   > 11 months since last LDCT     Risks

## 2020-07-29 NOTE — PATIENT INSTRUCTIONS
Personalized Preventive Plan for Charles Ramos - 7/29/2020  Medicare offers a range of preventive health benefits. Some of the tests and screenings are paid in full while other may be subject to a deductible, co-insurance, and/or copay. Some of these benefits include a comprehensive review of your medical history including lifestyle, illnesses that may run in your family, and various assessments and screenings as appropriate. After reviewing your medical record and screening and assessments performed today your provider may have ordered immunizations, labs, imaging, and/or referrals for you. A list of these orders (if applicable) as well as your Preventive Care list are included within your After Visit Summary for your review. Other Preventive Recommendations:    · A preventive eye exam performed by an eye specialist is recommended every 1-2 years to screen for glaucoma; cataracts, macular degeneration, and other eye disorders. · A preventive dental visit is recommended every 6 months. · Try to get at least 150 minutes of exercise per week or 10,000 steps per day on a pedometer . · Order or download the FREE \"Exercise & Physical Activity: Your Everyday Guide\" from [employer]. Call 0-917.402.4114 or search [employer] online. · You need 5130-1590 mg of calcium and 6436-3142 IU of vitamin D per day. It is possible to meet your calcium requirement with diet alone, but a vitamin D supplement is usually necessary to meet this goal.  · When exposed to the sun, use a sunscreen that protects against both UVA and UVB radiation with an SPF of 30 or greater. Reapply every 2 to 3 hours or after sweating, drying off with a towel, or swimming. · Always wear a seat belt when traveling in a car. Always wear a helmet when riding a bicycle or motorcycle. What is lung cancer screening?   Lung cancer screening is a way in which doctors check the lungs for early signs of cancer in people who have no symptoms of lung cancer. A low-dose CT scan uses much less radiation than a normal CT scan and shows a more detailed image of the lungs than a standard X-ray. The goal of lung cancer screening is to find cancer early, before it has a chance to grow, spread, or cause problems. One large study found that smokers who were screened with low-dose CT scans were less likely to die of lung cancer than those who were screened with standard X-ray. Below is a summary of the things you need to know regarding screening for lung cancer with low-dose computed tomography (LDCT). This is a screening program that involves routine annual screening with LDCT studies of the lung. The LDCTs are done using low-dose radiation that is not thought to increase your cancer risk. If you have other serious medical conditions (other cancers, congestive heart failure) that limit your life expectancy to less than 10 years, you should not undergo lung cancer screening with LDCT. The chance is 20%-60% that the LDCT result will show abnormalities. This would require additional testing which could include repeat imaging or even invasive procedures. Most (about 95%) of \"abnormal\" LDCT results are false in the sense that no lung cancer is ultimately found. Additionally, some (about 10%) of the cancers found would not affect your life expectancy, even if undetected and untreated. If you are still smoking, the single most important thing that you can do to reduce your risk of dying of lung cancer is to quit. For this screening to be covered by Medicare and most other insurers, strict criteria must be met. If you do not meet these criteria, but still wish to undergo LDCT testing, you will be required to sign a waiver indicating your willingness to pay for the scan.

## 2020-07-30 ENCOUNTER — TELEPHONE (OUTPATIENT)
Dept: OBGYN CLINIC | Age: 55
End: 2020-07-30

## 2020-07-30 LAB
CYTOLOGY REPORT: NORMAL
HPV SAMPLE: ABNORMAL
HPV, GENOTYPE 16: NOT DETECTED
HPV, GENOTYPE 18: NOT DETECTED
HPV, HIGH RISK OTHER: DETECTED
HPV, INTERPRETATION: ABNORMAL
SPECIMEN DESCRIPTION: ABNORMAL

## 2020-07-30 NOTE — TELEPHONE ENCOUNTER
Per diana pt notified of abnormal pap results showing HSIL +HPV and pt scheduled for colposcopy with Dr Bailee Rhoades.

## 2020-07-30 NOTE — TELEPHONE ENCOUNTER
LM for pt to call office regarding abnormal lab results. Pt +Hep C and will need referral to GI for consultation eval and tx.

## 2020-07-30 NOTE — TELEPHONE ENCOUNTER
Per July Stubbs pt notified of +Hep C and a referral to GI will be sent for consultation regarding +Hep C.

## 2020-07-30 NOTE — TELEPHONE ENCOUNTER
----- Message from ASHA Alvarez NP sent at 7/30/2020 12:15 PM EDT -----  Pap smear HSIL  HPV + other  Age 63  Patient needs scheduled for colp  Zone absent

## 2020-07-30 NOTE — TELEPHONE ENCOUNTER
----- Message from ASHA Tejada CNP sent at 7/29/2020  5:48 PM EDT -----  Hep C antibody positive, hep C RNA not detected.   Please refer patient to GI for follow up on hepatitis C.

## 2020-08-17 ENCOUNTER — TELEPHONE (OUTPATIENT)
Dept: ONCOLOGY | Age: 55
End: 2020-08-17

## 2020-08-18 ENCOUNTER — OFFICE VISIT (OUTPATIENT)
Dept: GASTROENTEROLOGY | Age: 55
End: 2020-08-18
Payer: MEDICARE

## 2020-08-18 ENCOUNTER — HOSPITAL ENCOUNTER (OUTPATIENT)
Age: 55
Setting detail: SPECIMEN
Discharge: HOME | End: 2020-08-18
Payer: MEDICARE

## 2020-08-18 VITALS — WEIGHT: 213.7 LBS | BODY MASS INDEX: 31.56 KG/M2

## 2020-08-18 LAB
ABSOLUTE EOS #: 0.1 K/UL (ref 0–0.4)
ABSOLUTE IMMATURE GRANULOCYTE: ABNORMAL K/UL (ref 0–0.3)
ABSOLUTE LYMPH #: 2.9 K/UL (ref 1–4.8)
ABSOLUTE MONO #: 0.8 K/UL (ref 0.1–1.3)
ALBUMIN SERPL-MCNC: 4.2 G/DL (ref 3.5–5.2)
ALBUMIN/GLOBULIN RATIO: ABNORMAL (ref 1–2.5)
ALP BLD-CCNC: 75 U/L (ref 35–104)
ALT SERPL-CCNC: 19 U/L (ref 5–33)
AST SERPL-CCNC: 13 U/L
BASOPHILS # BLD: 1 % (ref 0–2)
BASOPHILS ABSOLUTE: 0.1 K/UL (ref 0–0.2)
BILIRUB SERPL-MCNC: 0.15 MG/DL (ref 0.3–1.2)
BILIRUBIN DIRECT: <0.08 MG/DL
BILIRUBIN, INDIRECT: ABNORMAL MG/DL (ref 0–1)
DIFFERENTIAL TYPE: ABNORMAL
EOSINOPHILS RELATIVE PERCENT: 1 % (ref 0–4)
GLOBULIN: ABNORMAL G/DL (ref 1.5–3.8)
HCT VFR BLD CALC: 45.9 % (ref 36–46)
HEMOGLOBIN: 15.1 G/DL (ref 12–16)
IMMATURE GRANULOCYTES: ABNORMAL %
LYMPHOCYTES # BLD: 24 % (ref 24–44)
MCH RBC QN AUTO: 30.2 PG (ref 26–34)
MCHC RBC AUTO-ENTMCNC: 33 G/DL (ref 31–37)
MCV RBC AUTO: 91.6 FL (ref 80–100)
MONOCYTES # BLD: 7 % (ref 1–7)
NRBC AUTOMATED: ABNORMAL PER 100 WBC
PDW BLD-RTO: 13.8 % (ref 11.5–14.9)
PLATELET # BLD: 326 K/UL (ref 150–450)
PLATELET ESTIMATE: ABNORMAL
PMV BLD AUTO: 8.9 FL (ref 6–12)
RBC # BLD: 5.01 M/UL (ref 4–5.2)
RBC # BLD: ABNORMAL 10*6/UL
SEG NEUTROPHILS: 67 % (ref 36–66)
SEGMENTED NEUTROPHILS ABSOLUTE COUNT: 8.3 K/UL (ref 1.3–9.1)
TOTAL PROTEIN: 7 G/DL (ref 6.4–8.3)
WBC # BLD: 12.2 K/UL (ref 3.5–11)
WBC # BLD: ABNORMAL 10*3/UL

## 2020-08-18 PROCEDURE — 99201 PR OFFICE OUTPATIENT NEW 10 MINUTES: CPT | Performed by: INTERNAL MEDICINE

## 2020-08-18 PROCEDURE — G8427 DOCREV CUR MEDS BY ELIG CLIN: HCPCS | Performed by: INTERNAL MEDICINE

## 2020-08-18 PROCEDURE — 3017F COLORECTAL CA SCREEN DOC REV: CPT | Performed by: INTERNAL MEDICINE

## 2020-08-18 PROCEDURE — 36415 COLL VENOUS BLD VENIPUNCTURE: CPT

## 2020-08-18 PROCEDURE — 80076 HEPATIC FUNCTION PANEL: CPT

## 2020-08-18 PROCEDURE — 85025 COMPLETE CBC W/AUTO DIFF WBC: CPT

## 2020-08-18 RX ORDER — POLYETHYLENE GLYCOL 3350 17 G/17G
POWDER, FOR SOLUTION ORAL
Qty: 238 G | Refills: 0 | Status: SHIPPED | OUTPATIENT
Start: 2020-08-18 | End: 2020-09-17

## 2020-08-18 ASSESSMENT — ENCOUNTER SYMPTOMS
DIARRHEA: 0
VOMITING: 0
BLOOD IN STOOL: 0
NAUSEA: 0
SINUS PRESSURE: 0
TROUBLE SWALLOWING: 0
BACK PAIN: 0
VOICE CHANGE: 0
ABDOMINAL DISTENTION: 0
CHOKING: 1
ABDOMINAL PAIN: 0
ANAL BLEEDING: 0
SORE THROAT: 0
RECTAL PAIN: 0
WHEEZING: 0
COUGH: 1
GASTROINTESTINAL NEGATIVE: 1
CONSTIPATION: 0

## 2020-08-18 NOTE — PROGRESS NOTES
Reason for Referral:   Trell Medley, APRN - CNP  [address], [address] [place] [address]    Chief Complaint   Patient presents with    Hepatitis C     NP referral postive Hep C ANTIBODY, current RNA/PCR 7/28/20 NEGATIVE       1. Liver disease    2. History of colon polyps            HISTORY OF PRESENT ILLNESS: Ms.Kimberly Hollie Dobbins is a 63 y.o. female with a past history remarkable for colon polyps, referred for evaluation of   Chief Complaint   Patient presents with    Hepatitis C     NP referral postive Hep C ANTIBODY, current RNA/PCR 7/28/20 NEGATIVE   . Patient seen regarding liver disease. She was found to be positive for hepatitis C antibody. On further discussion patient denies symptoms related to liver disease including abdominal pain, bloating, nausea vomiting. No fever chills. No weight loss. Denies abdominal distention, swelling of the lower extremities etc.  No prior history of jaundice. She denies alcoholism. She denies risk factors for hepatitis C including IV drug use. No family history of liver disease. Patient has musculoskeletal issues and has been on narcotic medication. She also gives history of colon polyps removed 5 years ago. On labs reviewed. Her hepatitis C RNA reported to be negative. Recent liver function tests not available. Past Medical,Family, and Social History reviewed and does contribute to the patient presentingcondition. Patient's PMH/PSH,SH,PSYCH Hx, MEDs, ALLERGIES, and ROS were all reviewed and updated in the appropriate sections.     PAST MEDICAL HISTORY:  Past Medical History:   Diagnosis Date    Abnormal Pap smear of cervix 1/17/2017    Anxiety 1/24/2017    Arthritis     left ankle, wears ankle brace    Carpal tunnel syndrome, bilateral     Hepatitis C antibody positive in blood     History of pneumonia 2013    HSV-1 (herpes simplex virus 1) infection 8/7/2017    Hyperlipidemia     Hypertension     Mood swings     due to Menopause, On Lexapro    Obesity, unspecified     Urinary frequency     Vision abnormalities     wears glasses       Past Surgical History:   Procedure Laterality Date    APPENDECTOMY      CARPAL TUNNEL RELEASE Right 10/06/14    CARPAL TUNNEL RELEASE Left 9/8/2015    COLONOSCOPY  2015    SALPINGO-OOPHORECTOMY Right 1987    removed due to infection    TONSILLECTOMY         CURRENT MEDICATIONS:    Current Outpatient Medications:     varenicline (CHANTIX STARTING MONTH PAK) 0.5 MG X 11 & 1 MG X 42 tablet, Take by mouth., Disp: 1 box, Rfl: 0    albuterol sulfate HFA (VENTOLIN HFA) 108 (90 Base) MCG/ACT inhaler, Inhale 2 puffs into the lungs every 4 hours as needed for Wheezing or Shortness of Breath, Disp: 1 Inhaler, Rfl: 1    amLODIPine (NORVASC) 5 MG tablet, Take 1 tablet by mouth daily, Disp: 90 tablet, Rfl: 3    atorvastatin (LIPITOR) 40 MG tablet, Take 1 tablet by mouth daily, Disp: 90 tablet, Rfl: 3    pantoprazole (PROTONIX) 20 MG tablet, Take 1 tablet by mouth daily, Disp: 30 tablet, Rfl: 11    valACYclovir (VALTREX) 1 g tablet, TAKE ONE TABLET BY MOUTH DAILY AS NEEDED, Disp: 30 tablet, Rfl: 9    Blood Pressure Monitoring ([employer] DELUXE BP MONITOR) KIT, 1 each by Does not apply route daily, Disp: 1 kit, Rfl: 0    oxyCODONE-acetaminophen (PERCOCET) 7.5-325 MG per tablet, , Disp: , Rfl:     polyethylene glycol (GLYCOLAX) 17 GM/SCOOP powder, Use as directed by following your patient instructions given by office. , Disp: 238 g, Rfl: 0    bisacodyl (DULCOLAX) 5 MG EC tablet, Use as directed per instructions given by physician office. , Disp: 2 tablet, Rfl: 0    ALLERGIES:   Allergies   Allergen Reactions    Codeine Nausea Only       FAMILY HISTORY:       Problem Relation Age of Onset    High Blood Pressure Mother     Diabetes Mother     Stroke Mother     Heart Disease Mother     Heart Disease Father     Heart Disease Sister     Cancer Brother         lymph node and bone    Diabetes Brother     Hypertension Brother     Other Brother         bleeding ulcers         SOCIAL HISTORY:   Social History     Socioeconomic History    Marital status: Single     Spouse name: Not on file    Number of children: Not on file    Years of education: Not on file    Highest education level: Not on file   Occupational History    Not on file   Social Needs    Financial resource strain: Not on file    Food insecurity     Worry: Not on file     Inability: Not on file    Transportation needs     Medical: Not on file     Non-medical: Not on file   Tobacco Use    Smoking status: Current Every Day Smoker     Packs/day: 2.00     Years: 43.00     Pack years: 86.00     Types: Cigarettes     Start date: 8/8/1976    Smokeless tobacco: Never Used    Tobacco comment: has slowed down to .75 pack per day   Substance and Sexual Activity    Alcohol use: No     Alcohol/week: 0.0 standard drinks    Drug use: No    Sexual activity: Yes     Partners: Male     Birth control/protection: Post-menopausal   Lifestyle    Physical activity     Days per week: Not on file     Minutes per session: Not on file    Stress: Not on file   Relationships    Social connections     Talks on phone: Not on file     Gets together: Not on file     Attends Spiritism service: Not on file     Active member of club or organization: Not on file     Attends meetings of clubs or organizations: Not on file     Relationship status: Not on file    Intimate partner violence     Fear of current or ex partner: Not on file     Emotionally abused: Not on file     Physically abused: Not on file     Forced sexual activity: Not on file   Other Topics Concern    Not on file   Social History Narrative    Not on file       REVIEW OF SYSTEMS:       Review of Systems   Constitutional: Negative. Negative for appetite change, fatigue and unexpected weight change. HENT: Negative.   Negative for dental problem, postnasal drip, sinus pressure, sore throat, trouble swallowing and voice change. Denies   Eyes: Negative for visual disturbance. Respiratory: Positive for cough and choking. Negative for wheezing. Cardiovascular: Negative for chest pain, palpitations and leg swelling. Gastrointestinal: Negative. Negative for abdominal distention, abdominal pain, anal bleeding, blood in stool, constipation, diarrhea, nausea, rectal pain and vomiting. Denies   Genitourinary: Negative for difficulty urinating. Musculoskeletal: Negative for arthralgias, back pain, gait problem and myalgias. Allergic/Immunologic: Negative for environmental allergies and food allergies. Neurological: Negative for dizziness, weakness, light-headedness, numbness and headaches. Hematological: Does not bruise/bleed easily. Psychiatric/Behavioral: Negative for sleep disturbance. The patient is not nervous/anxious. PHYSICAL EXAMINATION: Vital signs reviewed per the nursing documentation. Wt 213 lb 11.2 oz (96.9 kg)   LMP 06/01/2012 (Within Weeks)   BMI 31.56 kg/m²   Body mass index is 31.56 kg/m². Physical Exam  Vitals signs and nursing note reviewed. Constitutional:       Appearance: She is well-developed. HENT:      Head: Normocephalic and atraumatic. Eyes:      General: No scleral icterus. Conjunctiva/sclera: Conjunctivae normal.      Pupils: Pupils are equal, round, and reactive to light. Neck:      Musculoskeletal: Normal range of motion and neck supple. Thyroid: No thyromegaly. Vascular: No hepatojugular reflux or JVD. Trachea: No tracheal deviation. Cardiovascular:      Rate and Rhythm: Normal rate and regular rhythm. Heart sounds: Normal heart sounds. Pulmonary:      Effort: Pulmonary effort is normal. No respiratory distress. Breath sounds: Normal breath sounds. No wheezing or rales. Abdominal:      General: Bowel sounds are normal. There is no distension. Palpations: Abdomen is soft.  There is no hepatomegaly or mass. Tenderness: There is no abdominal tenderness. There is no rebound. Hernia: No hernia is present. Musculoskeletal:         General: No tenderness. Comments: No joint swelling   Lymphadenopathy:      Cervical: No cervical adenopathy. Skin:     General: Skin is warm. Findings: No bruising, ecchymosis, erythema or rash. Neurological:      Mental Status: She is alert and oriented to person, place, and time. Cranial Nerves: No cranial nerve deficit. Psychiatric:         Thought Content: Thought content normal.           LABORATORY DATA: Reviewed  Lab Results   Component Value Date    WBC 12.2 (H) 08/18/2020    HGB 15.1 08/18/2020    HCT 45.9 08/18/2020    MCV 91.6 08/18/2020     08/18/2020     09/04/2019    K 4.0 09/04/2019     09/04/2019    CO2 21 09/04/2019    BUN 20 09/04/2019    CREATININE 0.79 09/04/2019    LABALBU 4.2 08/18/2020    BILITOT 0.15 (L) 08/18/2020    ALKPHOS 75 08/18/2020    AST 13 08/18/2020    ALT 19 08/18/2020         Lab Results   Component Value Date    RBC 5.01 08/18/2020    HGB 15.1 08/18/2020    MCV 91.6 08/18/2020    MCH 30.2 08/18/2020    MCHC 33.0 08/18/2020    RDW 13.8 08/18/2020    MPV 8.9 08/18/2020    BASOPCT 1 08/18/2020    LYMPHSABS 2.90 08/18/2020    MONOSABS 0.80 08/18/2020    NEUTROABS 8.30 08/18/2020    EOSABS 0.10 08/18/2020    BASOSABS 0.10 08/18/2020         DIAGNOSTIC TESTING:     No results found. IMPRESSION: Ms. Judi Olivares is a 63 y.o. female with     Assessment:  1. Liver disease    2. History of colon polyps        Plan: At present patient does not have physical findings consistent with chronic liver disease. I do not think that she has hepatitis C virus related liver disease as her hepatitis C RNA is negative. I will obtain liver function tests and also CBC. If her transaminases are abnormal, then will consider further work-up. Also advised to have ultrasound of the liver.     As patient has history of colon polyps removed 5 years ago she may need polyp surveillance colonoscopy and this is arranged as well. Discussed with the patient regarding colonoscopy procedure, risks and benefits, adequate colon preparation. Patient understood and verbalized the consent. Spent 30 minutes providing patient education and counseling. Thank you for allowing me to participate in the care of Ms. Gonzalo Rogers. For any further questions please do not hesitate to contact me. I have reviewed and agree with the MA/LPN ROS.      Verna Avila MD, Manolo Restrepo [facility]  Board Certified in Gastroenterology and [address] [place] [address] Gastroenterology  Office #: [phone]

## 2020-08-19 ENCOUNTER — HOSPITAL ENCOUNTER (OUTPATIENT)
Dept: CT IMAGING | Age: 55
Discharge: HOME | End: 2020-08-21
Payer: MEDICARE

## 2020-08-19 PROCEDURE — G0297 LDCT FOR LUNG CA SCREEN: HCPCS

## 2020-08-21 ENCOUNTER — HOSPITAL ENCOUNTER (OUTPATIENT)
Dept: PREADMISSION TESTING | Age: 55
Setting detail: SPECIMEN
Discharge: HOME | End: 2020-08-25
Payer: MEDICARE

## 2020-08-21 PROCEDURE — U0003 INFECTIOUS AGENT DETECTION BY NUCLEIC ACID (DNA OR RNA); SEVERE ACUTE RESPIRATORY SYNDROME CORONAVIRUS 2 (SARS-COV-2) (CORONAVIRUS DISEASE [COVID-19]), AMPLIFIED PROBE TECHNIQUE, MAKING USE OF HIGH THROUGHPUT TECHNOLOGIES AS DESCRIBED BY CMS-2020-01-R: HCPCS

## 2020-08-24 LAB — SARS-COV-2, NAA: NOT DETECTED

## 2020-08-25 ENCOUNTER — ANESTHESIA (OUTPATIENT)
Dept: ENDOSCOPY | Age: 55
End: 2020-08-25
Payer: MEDICARE

## 2020-08-25 ENCOUNTER — ANESTHESIA EVENT (OUTPATIENT)
Dept: ENDOSCOPY | Age: 55
End: 2020-08-25
Payer: MEDICARE

## 2020-08-25 ENCOUNTER — HOSPITAL ENCOUNTER (OUTPATIENT)
Age: 55
Setting detail: OUTPATIENT SURGERY
Discharge: HOME | End: 2020-08-25
Attending: INTERNAL MEDICINE | Admitting: INTERNAL MEDICINE
Payer: MEDICARE

## 2020-08-25 VITALS
HEART RATE: 73 BPM | WEIGHT: 211 LBS | HEIGHT: 69 IN | SYSTOLIC BLOOD PRESSURE: 136 MMHG | OXYGEN SATURATION: 93 % | TEMPERATURE: 98.1 F | RESPIRATION RATE: 18 BRPM | BODY MASS INDEX: 31.25 KG/M2 | DIASTOLIC BLOOD PRESSURE: 69 MMHG

## 2020-08-25 VITALS — SYSTOLIC BLOOD PRESSURE: 162 MMHG | OXYGEN SATURATION: 99 % | TEMPERATURE: 96.8 F | DIASTOLIC BLOOD PRESSURE: 90 MMHG

## 2020-08-25 PROCEDURE — G0105 COLORECTAL SCRN; HI RISK IND: HCPCS | Performed by: INTERNAL MEDICINE

## 2020-08-25 PROCEDURE — 6360000002 HC RX W HCPCS: Performed by: NURSE ANESTHETIST, CERTIFIED REGISTERED

## 2020-08-25 PROCEDURE — 3700000000 HC ANESTHESIA ATTENDED CARE: Performed by: INTERNAL MEDICINE

## 2020-08-25 PROCEDURE — 2709999900 HC NON-CHARGEABLE SUPPLY: Performed by: INTERNAL MEDICINE

## 2020-08-25 PROCEDURE — 7100000000 HC PACU RECOVERY - FIRST 15 MIN: Performed by: INTERNAL MEDICINE

## 2020-08-25 PROCEDURE — 3609027000 HC COLONOSCOPY: Performed by: INTERNAL MEDICINE

## 2020-08-25 PROCEDURE — 3700000001 HC ADD 15 MINUTES (ANESTHESIA): Performed by: INTERNAL MEDICINE

## 2020-08-25 PROCEDURE — 7100000001 HC PACU RECOVERY - ADDTL 15 MIN: Performed by: INTERNAL MEDICINE

## 2020-08-25 PROCEDURE — 2580000003 HC RX 258: Performed by: ANESTHESIOLOGY

## 2020-08-25 RX ORDER — PROPOFOL 10 MG/ML
INJECTION, EMULSION INTRAVENOUS PRN
Status: DISCONTINUED | OUTPATIENT
Start: 2020-08-25 | End: 2020-08-25 | Stop reason: SDUPTHER

## 2020-08-25 RX ORDER — ONDANSETRON 2 MG/ML
4 INJECTION INTRAMUSCULAR; INTRAVENOUS
Status: DISCONTINUED | OUTPATIENT
Start: 2020-08-25 | End: 2020-08-25 | Stop reason: HOSPADM

## 2020-08-25 RX ORDER — LABETALOL HYDROCHLORIDE 5 MG/ML
5 INJECTION, SOLUTION INTRAVENOUS EVERY 10 MIN PRN
Status: DISCONTINUED | OUTPATIENT
Start: 2020-08-25 | End: 2020-08-25 | Stop reason: HOSPADM

## 2020-08-25 RX ORDER — OXYCODONE HYDROCHLORIDE AND ACETAMINOPHEN 5; 325 MG/1; MG/1
2 TABLET ORAL PRN
Status: DISCONTINUED | OUTPATIENT
Start: 2020-08-25 | End: 2020-08-25 | Stop reason: HOSPADM

## 2020-08-25 RX ORDER — TOPIRAMATE 100 MG/1
TABLET, FILM COATED ORAL
COMMUNITY
Start: 2020-07-27

## 2020-08-25 RX ORDER — DIPHENHYDRAMINE HYDROCHLORIDE 50 MG/ML
12.5 INJECTION INTRAMUSCULAR; INTRAVENOUS
Status: DISCONTINUED | OUTPATIENT
Start: 2020-08-25 | End: 2020-08-25 | Stop reason: HOSPADM

## 2020-08-25 RX ORDER — OXYCODONE HYDROCHLORIDE AND ACETAMINOPHEN 5; 325 MG/1; MG/1
1 TABLET ORAL PRN
Status: DISCONTINUED | OUTPATIENT
Start: 2020-08-25 | End: 2020-08-25 | Stop reason: HOSPADM

## 2020-08-25 RX ORDER — PROPOFOL 10 MG/ML
INJECTION, EMULSION INTRAVENOUS CONTINUOUS PRN
Status: DISCONTINUED | OUTPATIENT
Start: 2020-08-25 | End: 2020-08-25 | Stop reason: SDUPTHER

## 2020-08-25 RX ORDER — SODIUM CHLORIDE, SODIUM LACTATE, POTASSIUM CHLORIDE, CALCIUM CHLORIDE 600; 310; 30; 20 MG/100ML; MG/100ML; MG/100ML; MG/100ML
INJECTION, SOLUTION INTRAVENOUS CONTINUOUS
Status: DISCONTINUED | OUTPATIENT
Start: 2020-08-25 | End: 2020-08-25 | Stop reason: HOSPADM

## 2020-08-25 RX ADMIN — PROPOFOL 125 MCG/KG/MIN: 10 INJECTION, EMULSION INTRAVENOUS at 10:19

## 2020-08-25 RX ADMIN — PROPOFOL 100 MG: 10 INJECTION, EMULSION INTRAVENOUS at 10:19

## 2020-08-25 RX ADMIN — SODIUM CHLORIDE, POTASSIUM CHLORIDE, SODIUM LACTATE AND CALCIUM CHLORIDE: 600; 310; 30; 20 INJECTION, SOLUTION INTRAVENOUS at 09:27

## 2020-08-25 RX ADMIN — PROPOFOL 100 MG: 10 INJECTION, EMULSION INTRAVENOUS at 10:24

## 2020-08-25 ASSESSMENT — PULMONARY FUNCTION TESTS
PIF_VALUE: 1

## 2020-08-25 ASSESSMENT — LIFESTYLE VARIABLES: SMOKING_STATUS: 1

## 2020-08-25 ASSESSMENT — PAIN - FUNCTIONAL ASSESSMENT: PAIN_FUNCTIONAL_ASSESSMENT: 0-10

## 2020-08-25 ASSESSMENT — PAIN SCALES - GENERAL: PAINLEVEL_OUTOF10: 4

## 2020-08-25 NOTE — H&P
HISTORY and Nick Mcf[id number]       NAME:  Kanchan Whiting  MRN: [id number]   YOB: 1965   Date: 8/25/2020   Age: 63 y.o. Gender: female       COMPLAINT AND PRESENT HISTORY:   Kanchan Whiting is 63 y.o.,   female, having a Diagnostic Colonoscopy. Prior Colonoscopy was done 5 years ago. with polyp removed. Patient has hx of Colon Polyps. Isak Gupta has hx of Hepatitis C  Patient denies any  FH of Colon Cancer. Patient reports no changes in bowel habits. No GI /Rectal bleeding    Patient denies any other GI symptoms. No nausea / vomiting. No diarrhea or constipation. No abdominal pains or cramping. No heartburn or dysphagia. no changes in the color, caliber or consistency of the stools. No fever or chills.     PAST MEDICAL HISTORY     Past Medical History:   Diagnosis Date    Abnormal Pap smear of cervix 1/17/2017    Anxiety 1/24/2017    Arthritis     left ankle, wears ankle brace    Carpal tunnel syndrome, bilateral     Hepatitis C antibody positive in blood     History of pneumonia 2013    HSV-1 (herpes simplex virus 1) infection 8/7/2017    Hyperlipidemia     Hypertension     Mood swings     due to Menopause, On Lexapro    Obesity, unspecified     Urinary frequency     Vision abnormalities     wears glasses       SURGICAL HISTORY       Past Surgical History:   Procedure Laterality Date    APPENDECTOMY      CARPAL TUNNEL RELEASE Right 10/06/14    CARPAL TUNNEL RELEASE Left 9/8/2015    COLONOSCOPY  2015    SALPINGO-OOPHORECTOMY Right 1987    removed due to infection    TONSILLECTOMY         FAMILY HISTORY       Family History   Problem Relation Age of Onset    High Blood Pressure Mother     Diabetes Mother     Stroke Mother     Heart Disease Mother     Heart Disease Father     Heart Disease Sister     Cancer Brother         lymph node and bone    Diabetes Brother     Hypertension Brother     Other Brother         bleeding ulcers SOCIAL HISTORY       Social History     Socioeconomic History    Marital status: Single     Spouse name: None    Number of children: None    Years of education: None    Highest education level: None   Occupational History    None   Social Needs    Financial resource strain: None    Food insecurity     Worry: None     Inability: None    Transportation needs     Medical: None     Non-medical: None   Tobacco Use    Smoking status: Current Every Day Smoker     Packs/day: 1.00     Years: 43.00     Pack years: 43.00     Types: Cigarettes     Start date: 8/8/1976    Smokeless tobacco: Never Used    Tobacco comment: has slowed down to .75 pack per day   Substance and Sexual Activity    Alcohol use: No     Alcohol/week: 0.0 standard drinks    Drug use: No    Sexual activity: Yes     Partners: Male     Birth control/protection: Post-menopausal   Lifestyle    Physical activity     Days per week: None     Minutes per session: None    Stress: None   Relationships    Social connections     Talks on phone: None     Gets together: None     Attends Catholic service: None     Active member of club or organization: None     Attends meetings of clubs or organizations: None     Relationship status: None    Intimate partner violence     Fear of current or ex partner: None     Emotionally abused: None     Physically abused: None     Forced sexual activity: None   Other Topics Concern    None   Social History Narrative    None           REVIEW OF SYSTEMS      Allergies   Allergen Reactions    Codeine Nausea Only       No current facility-administered medications on file prior to encounter. Current Outpatient Medications on File Prior to Encounter   Medication Sig Dispense Refill    topiramate (TOPAMAX) 100 MG tablet       polyethylene glycol (GLYCOLAX) 17 GM/SCOOP powder Use as directed by following your patient instructions given by office.  238 g 0    bisacodyl (DULCOLAX) 5 MG EC tablet Use as directed per instructions given by physician office. 2 tablet 0    albuterol sulfate HFA (VENTOLIN HFA) 108 (90 Base) MCG/ACT inhaler Inhale 2 puffs into the lungs every 4 hours as needed for Wheezing or Shortness of Breath 1 Inhaler 1    amLODIPine (NORVASC) 5 MG tablet Take 1 tablet by mouth daily 90 tablet 3    atorvastatin (LIPITOR) 40 MG tablet Take 1 tablet by mouth daily 90 tablet 3    pantoprazole (PROTONIX) 20 MG tablet Take 1 tablet by mouth daily 30 tablet 11    valACYclovir (VALTREX) 1 g tablet TAKE ONE TABLET BY MOUTH DAILY AS NEEDED 30 tablet 9    oxyCODONE-acetaminophen (PERCOCET) 7.5-325 MG per tablet       varenicline (CHANTIX STARTING MONTH PAK) 0.5 MG X 11 & 1 MG X 42 tablet Take by mouth. 1 box 0    Blood Pressure Monitoring ([employer] DELUXE BP MONITOR) KIT 1 each by Does not apply route daily 1 kit 0       Negative except for what is mentioned in the HPI. GENERAL PHYSICAL EXAM     Vitals: BP (!) 158/56   Pulse 78   Temp 97.7 °F (36.5 °C) (Infrared)   Resp 16   Ht 5' 9\" (1.753 m)   Wt 211 lb (95.7 kg)   LMP 06/01/2012 (Within Weeks)   SpO2 97%   BMI 31.16 kg/m²  Body mass index is 31.16 kg/m². GENERAL APPEARANCE:   Saran Tran is 63 y.o.,  female, moderately obese, nourished, conscious, alert. Does not appear to be distress or pain at this time. SKIN:  Warm, dry, no cyanosis or jaundice. HEAD:  Normocephalic, atraumatic, no swelling or tenderness. EYES:  Pupils equal, reactive to light. EARS:  No discharge, no marked hearing loss. NOSE:  No rhinorrhea, epistaxis or septal deformity. THROAT:  Not congested. No ulceration bleeding or discharge. NECK:  No stiffness, trachea central.  No palpable masses or L.N.                 CHEST:  Symmetrical and equal on expansion. HEART:  RRR S1 > S2. No audible murmurs or gallops. LUNGS:  Equal on expansion, normal breath sounds. No adventitious sounds. ABDOMEN:  Obese. Soft on palpation. No dysphagia, No localized tenderness. No guarding or rigidity. No palpable hepatosplenomegaly. LYMPHATICS:  No palpable cervical lymphadenopathy. LOCOMOTOR, BACK AND SPINE:  No tenderness or deformities. EXTREMITIES:  Symmetrical, no pretibial edema. Fayes sign negative. No discoloration or ulcerations. NEUROLOGIC:  The patient is conscious, alert, oriented,Cranial nerve II-XII intact, taste and smell were not examined. No apparent focal sensory or motor deficits.              PROVISIONAL DIAGNOSES / SURGERY:      HISTORY OF COLON POLYPS    COLONOSCOPY DIAGNOSTIC     Patient Active Problem List    Diagnosis Date Noted    HSIL (high grade squamous intraepithelial lesion) on Pap smear of cervix 09/04/2018     Priority: High    Hypertension      Priority: High    History of tobacco abuse 05/22/2015     Priority: High    HTN (hypertension) 08/19/2014     Priority: High    S/P removal of right ovary      Priority: Medium    Personal history of tobacco use, presenting hazards to health      Priority: Medium    Urinary frequency      Priority: Low    Vision abnormalities      Priority: Low    History of ankle fracture 05/22/2015     Priority: Low    Carpal tunnel syndrome on right 10/06/2014     Priority: Low    Obesity      Priority: Low    Hepatitis C antibody positive in blood     Prediabetes 10/03/2019    Cervical stenosis (uterine cervix) 11/08/2018    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

## 2020-08-25 NOTE — OP NOTE
COLONOSCOPY    DATE OF PROCEDURE: 8/25/2020    SURGEON: Preet Wilson MD    ASSISTANT: None    PREOPERATIVE DIAGNOSIS: Polyp surveillance colonoscopy    POSTOPERATIVE DIAGNOSIS: No lesions seen    OPERATION: Total colonoscopy     ANESTHESIA: MAC    ESTIMATED BLOOD LOSS: None    COMPLICATIONS: None     SPECIMENS:  Was Not Obtained    HISTORY: The patient is a 63y.o. year old female with history of above preop diagnosis. I recommended colonoscopy with possible biopsy or polypectomy and I explained the risk, benefits, expected outcome, and alternatives to the procedure. Risks included but are not limited to bleeding, infection, respiratory distress, hypotension, and perforation of the colon and possibility of missing a lesion. The patient understands and is in agreement. PROCEDURE:  The patient's SPO2 remained above 90% throughout the procedure. Digital rectal exam was normal.  The colonoscope was inserted through the anus into the rectum and advanced under direct vision to the cecum without difficulty. Terminal ileum was examined for approximately 2 inches. The prep was adequate. Findings:  Terminal ileum: normal    Cecum/Ascending colon: normal, also examined in the retroflexed view    Transverse colon: normal    Descending/Sigmoid colon: normal    Rectum/Anus: examined in normal and retroflexed positions and was normal    Withdrawal Time was (minutes): 12          The colon was decompressed and the scope was removed. The patient tolerated the procedure without unusual events. During the procedure, the patient's blood pressure, pulse and oxygen saturation remained stable and documented. No unusual events occurred during the procedure. Patient was transferred to recovery room and will be discharged when criteria is met. Recommendations/Plan:   1.   2. F/U In Office as instructed  3. Discussed with the family  4. High fiber diet   5.  Precautions to avoid constipation       Electronically signed by Veronica Dias MD  on 8/25/2020 at 10:40 AM

## 2020-08-25 NOTE — ANESTHESIA PRE PROCEDURE
Packs/day: 2.00     Years: 43.00     Pack years: 86.00     Types: Cigarettes     Start date: 8/8/1976    Smokeless tobacco: Never Used    Tobacco comment: has slowed down to .75 pack per day   Substance Use Topics    Alcohol use: No     Alcohol/week: 0.0 standard drinks                                Ready to quit: Not Answered  Counseling given: Not Answered  Comment: has slowed down to .75 pack per day      Vital Signs (Current): There were no vitals filed for this visit. BP Readings from Last 3 Encounters:   07/29/20 124/74   07/27/20 122/82   12/10/19 124/64       NPO Status:                                                                                 BMI:   Wt Readings from Last 3 Encounters:   08/18/20 213 lb 11.2 oz (96.9 kg)   07/29/20 212 lb (96.2 kg)   07/27/20 211 lb (95.7 kg)     There is no height or weight on file to calculate BMI.    CBC:   Lab Results   Component Value Date    WBC 12.2 08/18/2020    RBC 5.01 08/18/2020    HGB 15.1 08/18/2020    HCT 45.9 08/18/2020    MCV 91.6 08/18/2020    RDW 13.8 08/18/2020     08/18/2020       CMP:   Lab Results   Component Value Date     09/04/2019    K 4.0 09/04/2019     09/04/2019    CO2 21 09/04/2019    BUN 20 09/04/2019    CREATININE 0.79 09/04/2019    GFRAA >60 09/04/2019    LABGLOM >60 09/04/2019    GLUCOSE 87 09/04/2019    PROT 7.0 08/18/2020    CALCIUM 9.8 09/04/2019    BILITOT 0.15 08/18/2020    ALKPHOS 75 08/18/2020    AST 13 08/18/2020    ALT 19 08/18/2020       POC Tests: No results for input(s): POCGLU, POCNA, POCK, POCCL, POCBUN, POCHEMO, POCHCT in the last 72 hours.     Coags: No results found for: PROTIME, INR, APTT    HCG (If Applicable):   Lab Results   Component Value Date    PREGTESTUR negative 01/17/2017        ABGs: No results found for: PHART, PO2ART, CKZ7RXY, NOI2GTU, BEART, U9HSLVAS     Type & Screen (If Applicable):  No results found for: LABABO, LABRH    Drug/Infectious Status (If Applicable):  Lab Results   Component Value Date    HEPCAB REACTIVE 07/27/2020       COVID-19 Screening (If Applicable):   Lab Results   Component Value Date    COVID19 Not Detected 08/20/2020         Anesthesia Evaluation  Patient summary reviewed and Nursing notes reviewed no history of anesthetic complications:   Airway: Mallampati: III  TM distance: >3 FB   Neck ROM: full  Mouth opening: > = 3 FB Dental:    (+) upper dentures and lower dentures      Pulmonary:normal exam  breath sounds clear to auscultation  (+) current smoker                           Cardiovascular:    (+) hypertension:,         Rhythm: regular  Rate: normal                    Neuro/Psych:   (+) neuromuscular disease:,             GI/Hepatic/Renal: Neg GI/Hepatic/Renal ROS            Endo/Other: Negative Endo/Other ROS                    Abdominal:           Vascular: negative vascular ROS. Anesthesia Plan      MAC     ASA 2       Induction: intravenous. MIPS: Prophylactic antiemetics administered. Anesthetic plan and risks discussed with patient. Plan discussed with CRNA.                   Laura Galvez MD   8/25/2020

## 2020-09-09 ENCOUNTER — HOSPITAL ENCOUNTER (OUTPATIENT)
Dept: WOMENS IMAGING | Age: 55
Discharge: HOME | End: 2020-09-11
Payer: MEDICARE

## 2020-09-09 PROCEDURE — 77063 BREAST TOMOSYNTHESIS BI: CPT

## 2020-09-10 ENCOUNTER — OFFICE VISIT (OUTPATIENT)
Dept: INTERNAL MEDICINE CLINIC | Age: 55
End: 2020-09-10
Payer: MEDICARE

## 2020-09-10 VITALS
BODY MASS INDEX: 30.96 KG/M2 | HEART RATE: 76 BPM | RESPIRATION RATE: 16 BRPM | DIASTOLIC BLOOD PRESSURE: 74 MMHG | HEIGHT: 69 IN | TEMPERATURE: 98.2 F | SYSTOLIC BLOOD PRESSURE: 114 MMHG | WEIGHT: 209 LBS

## 2020-09-10 PROCEDURE — G8427 DOCREV CUR MEDS BY ELIG CLIN: HCPCS | Performed by: NURSE PRACTITIONER

## 2020-09-10 PROCEDURE — G8417 CALC BMI ABV UP PARAM F/U: HCPCS | Performed by: NURSE PRACTITIONER

## 2020-09-10 PROCEDURE — 4004F PT TOBACCO SCREEN RCVD TLK: CPT | Performed by: NURSE PRACTITIONER

## 2020-09-10 PROCEDURE — G8926 SPIRO NO PERF OR DOC: HCPCS | Performed by: NURSE PRACTITIONER

## 2020-09-10 PROCEDURE — 3017F COLORECTAL CA SCREEN DOC REV: CPT | Performed by: NURSE PRACTITIONER

## 2020-09-10 PROCEDURE — G0444 DEPRESSION SCREEN ANNUAL: HCPCS | Performed by: NURSE PRACTITIONER

## 2020-09-10 PROCEDURE — 99214 OFFICE O/P EST MOD 30 MIN: CPT | Performed by: NURSE PRACTITIONER

## 2020-09-10 PROCEDURE — 3023F SPIROM DOC REV: CPT | Performed by: NURSE PRACTITIONER

## 2020-09-10 ASSESSMENT — PATIENT HEALTH QUESTIONNAIRE - PHQ9
SUM OF ALL RESPONSES TO PHQ9 QUESTIONS 1 & 2: 3
SUM OF ALL RESPONSES TO PHQ QUESTIONS 1-9: 3
7. TROUBLE CONCENTRATING ON THINGS, SUCH AS READING THE NEWSPAPER OR WATCHING TELEVISION: 0
5. POOR APPETITE OR OVEREATING: 0
3. TROUBLE FALLING OR STAYING ASLEEP: 0
2. FEELING DOWN, DEPRESSED OR HOPELESS: 3
9. THOUGHTS THAT YOU WOULD BE BETTER OFF DEAD, OR OF HURTING YOURSELF: 0
8. MOVING OR SPEAKING SO SLOWLY THAT OTHER PEOPLE COULD HAVE NOTICED. OR THE OPPOSITE, BEING SO FIGETY OR RESTLESS THAT YOU HAVE BEEN MOVING AROUND A LOT MORE THAN USUAL: 0
6. FEELING BAD ABOUT YOURSELF - OR THAT YOU ARE A FAILURE OR HAVE LET YOURSELF OR YOUR FAMILY DOWN: 0
4. FEELING TIRED OR HAVING LITTLE ENERGY: 0
SUM OF ALL RESPONSES TO PHQ QUESTIONS 1-9: 3
1. LITTLE INTEREST OR PLEASURE IN DOING THINGS: 0
10. IF YOU CHECKED OFF ANY PROBLEMS, HOW DIFFICULT HAVE THESE PROBLEMS MADE IT FOR YOU TO DO YOUR WORK, TAKE CARE OF THINGS AT HOME, OR GET ALONG WITH OTHER PEOPLE: 0

## 2020-09-10 ASSESSMENT — ENCOUNTER SYMPTOMS
RHINORRHEA: 0
EYE REDNESS: 0
COUGH: 1
ABDOMINAL PAIN: 0
CONSTIPATION: 0
SORE THROAT: 0
SHORTNESS OF BREATH: 1
WHEEZING: 0
DIARRHEA: 0

## 2020-09-10 NOTE — PROGRESS NOTES
Visit Information    Have you changed or started any medications since your last visit including any over-the-counter medicines, vitamins, or herbal medicines? no   Are you having any side effects from any of your medications? -  no  Have you stopped taking any of your medications? Is so, why? -  no    Have you seen any other physician or provider since your last visit? Yes - Records Obtained  Have you had any other diagnostic tests since your last visit? Yes - Records Obtained  Have you been seen in the emergency room and/or had an admission to a hospital since we last saw you? No  Have you had your routine dental cleaning in the past 6 months? yes     Have you activated your Jelli account? If not, what are your barriers? No     Patient Care Team:  ASHA Oates CNP as PCP - General (Internal Medicine)  ASHA Oates CNP as PCP [facility]  Maria Del Carmen Barr DO as Consulting Physician (Obstetrics & Gynecology)  Rukhsana Garrison RN as Imaging Navigator    Medical History Review  Past Medical, Family, and Social History reviewed and does contribute to the patient presenting condition    Health Maintenance   Topic Date Due    DTaP/Tdap/Td vaccine (1 - Tdap) 05/06/1983    Shingles Vaccine (1 of 2) 05/06/2014    Flu vaccine (1) 09/01/2020    A1C test (Diabetic or Prediabetic)  09/04/2020    Lipid screen  09/04/2020    Cervical cancer screen  07/27/2021    Annual Wellness Visit (AWV)  07/30/2021    Low dose CT lung screening  08/19/2021    Breast cancer screen  09/10/2021    Colon cancer screen colonoscopy  08/25/2025    Pneumococcal 0-64 years Vaccine  Completed    Hepatitis C screen  Completed    HIV screen  Completed    Hepatitis A vaccine  Aged Out    Hepatitis B vaccine  Aged Out    Hib vaccine  Aged Out    Meningococcal (ACWY) vaccine  Aged Out     Chief Complaint   Patient presents with    Results     Pt presents today for CT results.    [address] [facility] Due dtap. [address] [address]  Dept: [phone]  Dept Fax: [phone]    Office Progress/Follow Up Note  Date of patient's visit: 9/10/2020   Patient's Name:  Janae Dixon  YOB: 1965            Patient Care Team:  ASHA Berger CNP as PCP - General (Internal Medicine)  ASHA Berger CNP as PCP - [facility]  Baylee Voss DO as Consulting Physician (Obstetrics & Gynecology)  Deysi Delacruz RN as Imaging Navigator    REASON FOR VISIT: Results (Pt presents today for CT results.) and Health Maintenance (Due dtap.)        HISTORY OF PRESENT ILLNESS:      History was obtained from the patient. Janae Dixon is a 63 y.o. is here for follow-up of CT lung. Results reviewed with patient, including emphysema, tree-in-bud opacity of left upper lobe, atherosclerosis of aorta. Patient states her breathing is good, no wheezing, chronic smoker's cough in the morning with sputum. Reviewed cardiac Risk score 5.8%. Patient does not want stress test at this time. She is working on smoking cessation, continues on high intensity statin, discussed benefits of healthy diet and regular exercise. .     Patient Active Problem List   Diagnosis    Obesity    Personal history of tobacco use, presenting hazards to health    HTN (hypertension)    Carpal tunnel syndrome on right    History of tobacco abuse    History of ankle fracture    Hypertension    Urinary frequency    Vision abnormalities    S/P removal of right ovary    Hyperlipidemia    Carpal tunnel syndrome, bilateral    Status post carpal tunnel release of both wrists    Obesity    Arthritis    Mood swings    History of pneumonia    Abnormal Pap smear of cervix    Anxiety    Caregiver stress    HSV-1 (herpes simplex virus 1) infection    HSV-2 seropositive    HSIL (high grade squamous intraepithelial lesion) on Pap smear of cervix    Cervical stenosis (uterine cervix)    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 238 g 0    bisacodyl (DULCOLAX) 5 MG EC tablet Use as directed per instructions given by physician office. 2 tablet 0    albuterol sulfate HFA (VENTOLIN HFA) 108 (90 Base) MCG/ACT inhaler Inhale 2 puffs into the lungs every 4 hours as needed for Wheezing or Shortness of Breath 1 Inhaler 1    amLODIPine (NORVASC) 5 MG tablet Take 1 tablet by mouth daily 90 tablet 3    atorvastatin (LIPITOR) 40 MG tablet Take 1 tablet by mouth daily 90 tablet 3    pantoprazole (PROTONIX) 20 MG tablet Take 1 tablet by mouth daily 30 tablet 11    valACYclovir (VALTREX) 1 g tablet TAKE ONE TABLET BY MOUTH DAILY AS NEEDED 30 tablet 9    Blood Pressure Monitoring ([employer] DELUXE BP MONITOR) KIT 1 each by Does not apply route daily 1 kit 0    oxyCODONE-acetaminophen (PERCOCET) 7.5-325 MG per tablet        No current facility-administered medications for this visit. SOCIAL HISTORY    Reviewed and updated. Girish Hernandez  reports that she has been smoking cigarettes. She started smoking about 44 years ago. She has a 43.00 pack-year smoking history. She has never used smokeless tobacco.    FAMILY HISTORY:    Reviewed and updated. family history includes Cancer in her brother; Diabetes in her brother and mother; Heart Disease in her father, mother, and sister; High Blood Pressure in her mother; Hypertension in her brother; Other in her brother; Stroke in her mother. REVIEW OF SYSTEMS:      Review of Systems   Constitutional: Negative for chills, fatigue and fever.    HENT: Negative for congestion, rhinorrhea and sore throat. Eyes: Negative for redness and visual disturbance. Respiratory: Positive for cough (morning) and shortness of breath (At baseline). Negative for wheezing. Using twice daily rescue inhaler   Cardiovascular: Negative for chest pain, palpitations and leg swelling. Gastrointestinal: Negative for abdominal pain, constipation and diarrhea. Musculoskeletal: Positive for arthralgias. Negative for gait problem. Neurological: Negative for dizziness and syncope. PHYSICAL EXAM:      Vitals:    09/10/20 1244   BP: 114/74   Site: Right Upper Arm   Position: Sitting   Cuff Size: Large Adult   Pulse: 76   Resp: 16   Temp: 98.2 °F (36.8 °C)   Weight: 209 lb (94.8 kg)   Height: 5' 9\" (1.753 m)     BP Readings from Last 3 Encounters:   09/10/20 114/74   08/25/20 136/69   08/25/20 (!) 162/90        Physical Exam  Constitutional:       General: She is not in acute distress. Appearance: She is well-developed. She is obese. She is not ill-appearing. HENT:      Head: Normocephalic and atraumatic. Right Ear: External ear normal.      Left Ear: External ear normal.      Nose: Nose normal.      Mouth/Throat:      Mouth: Mucous membranes are moist.      Pharynx: Oropharynx is clear. Eyes:      General:         Right eye: No discharge. Left eye: No discharge. Conjunctiva/sclera: Conjunctivae normal.      Pupils: Pupils are equal, round, and reactive to light. Cardiovascular:      Rate and Rhythm: Normal rate and regular rhythm. Heart sounds: Normal heart sounds. No murmur. Pulmonary:      Effort: Pulmonary effort is normal.      Breath sounds: Normal breath sounds. No wheezing or rhonchi. Abdominal:      General: Bowel sounds are normal. There is no distension. Palpations: Abdomen is soft. Tenderness: There is no abdominal tenderness.    Musculoskeletal:      Cervical back: Normal.   Neurological:      Mental Status: She is alert and oriented to person, place, and time. Gait: Gait normal.   Psychiatric:         Mood and Affect: Mood normal.         Behavior: Behavior normal.          LABORATORY FINDINGS:    CBC:   Lab Results   Component Value Date    WBC 12.2 08/18/2020    HGB 15.1 08/18/2020     08/18/2020     BMP:   Lab Results   Component Value Date     09/04/2019    K 4.0 09/04/2019     09/04/2019    CO2 21 09/04/2019    BUN 20 09/04/2019    CREATININE 0.79 09/04/2019    GLUCOSE 87 09/04/2019     HEMOGLOBIN A1C:   Lab Results   Component Value Date    LABA1C 5.7 09/04/2019     FASTING LIPID PANEL:   Lab Results   Component Value Date    CHOL 148 09/04/2019    HDL 37 (L) 09/04/2019    LDLCHOLESTEROL 73 09/04/2019    TRIG 190 (H) 09/04/2019       ASSESSMENT AND PLAN:      Visit Diagnoses and Associated Orders     Abnormal CT lung screening    -  Primary    Patient is asymptomatic, check chest x-ray, continue annual lung cancer screening    XR CHEST STANDARD (2 VW) [88416 Custom]   - Future Order         Hyperlipidemia, unspecified hyperlipidemia type        Needs lipid panel, patient to get labs when she can. Continue Lipitor         Current every day smoker        Could not tolerate Chantix, weaning on her own. Refused patches         Obesity (BMI 30-39. 9)        Recommend healthy weight loss through healthy diet and regular exercise         Atherosclerosis of aorta (HCC)        Cardiac risk score 5.8%. Discussed reduction of risk factors (quit smoking, lose weight, healthy diet & exercise). Continue statin. Patient refuses stress test         Pulmonary emphysema, unspecified emphysema type ([facility] [address]        Patient refuses maintenance inhaler at this point, continue smoking reduction/cessation, rescue inhaler as needed                FOLLOW UP AND INSTRUCTIONS:     Return in about 3 months (around 12/10/2020) for routine follow up, or sooner if needed.     · Savi De La Torre received counseling on the following healthy behaviors: nutrition, exercise, medication adherence and tobacco cessation    · Discussed use, benefit, and side effects of prescribed medications. Barriers to medication compliance addressed. All patient questions answered. Pt voiced understanding. ASHA Thompson CNP    9/10/2020, 1:25 PM    Please note that this chart was generated using voice recognition Dragon dictation software. Although every effort was made to ensure the accuracy of this automatedtranscription, some errors in transcription may have occurred.

## 2020-10-06 ENCOUNTER — TELEPHONE (OUTPATIENT)
Dept: OBGYN CLINIC | Age: 55
End: 2020-10-06

## 2020-10-12 ENCOUNTER — HOSPITAL ENCOUNTER (OUTPATIENT)
Dept: ULTRASOUND IMAGING | Age: 55
Discharge: HOME | End: 2020-10-14
Payer: MEDICARE

## 2020-10-12 PROCEDURE — 76705 ECHO EXAM OF ABDOMEN: CPT

## 2021-01-15 DIAGNOSIS — B00.1 RECURRENT COLD SORES: ICD-10-CM

## 2021-01-19 RX ORDER — VALACYCLOVIR HYDROCHLORIDE 1 G/1
TABLET, FILM COATED ORAL
Qty: 30 TABLET | Refills: 1 | Status: SHIPPED | OUTPATIENT
Start: 2021-01-19 | End: 2021-03-22 | Stop reason: SDUPTHER

## 2021-01-20 DIAGNOSIS — B00.1 RECURRENT COLD SORES: ICD-10-CM

## 2021-01-20 RX ORDER — VALACYCLOVIR HYDROCHLORIDE 1 G/1
TABLET, FILM COATED ORAL
Qty: 30 TABLET | Refills: 1 | OUTPATIENT
Start: 2021-01-20

## 2021-03-03 NOTE — TELEPHONE ENCOUNTER
Medication: Pantoprazole   Last visit: 9/10/20  Next visit: Visit date not found  Last refill: 1/5/21  Pharmacy: [facility]

## 2021-03-04 RX ORDER — PANTOPRAZOLE SODIUM 20 MG/1
TABLET, DELAYED RELEASE ORAL
Qty: 30 TABLET | Refills: 1 | Status: SHIPPED | OUTPATIENT
Start: 2021-03-04 | End: 2021-05-04

## 2021-03-22 DIAGNOSIS — B00.1 RECURRENT COLD SORES: ICD-10-CM

## 2021-03-23 RX ORDER — VALACYCLOVIR HYDROCHLORIDE 1 G/1
1000 TABLET, FILM COATED ORAL DAILY
Qty: 30 TABLET | Refills: 0 | Status: SHIPPED | OUTPATIENT
Start: 2021-03-23 | End: 2021-04-20

## 2021-03-31 ENCOUNTER — TELEPHONE (OUTPATIENT)
Dept: OBGYN CLINIC | Age: 56
End: 2021-03-31

## 2021-03-31 NOTE — TELEPHONE ENCOUNTER
Called to krystina Mcallister from 10/15/20 cancellation patient states she will call after she gets her Covid 19 shot to reschedule.

## 2021-04-19 DIAGNOSIS — B00.1 RECURRENT COLD SORES: ICD-10-CM

## 2021-04-20 RX ORDER — VALACYCLOVIR HYDROCHLORIDE 1 G/1
TABLET, FILM COATED ORAL
Qty: 30 TABLET | Refills: 0 | Status: SHIPPED | OUTPATIENT
Start: 2021-04-20 | End: 2021-05-17

## 2021-04-28 ENCOUNTER — HOSPITAL ENCOUNTER (OUTPATIENT)
Age: 56
Discharge: HOME | End: 2021-04-30
Payer: MEDICARE

## 2021-04-28 ENCOUNTER — OFFICE VISIT (OUTPATIENT)
Dept: FAMILY MEDICINE CLINIC | Age: 56
End: 2021-04-28
Payer: MEDICARE

## 2021-04-28 ENCOUNTER — TELEPHONE (OUTPATIENT)
Dept: ADMINISTRATIVE | Age: 56
End: 2021-04-28

## 2021-04-28 ENCOUNTER — HOSPITAL ENCOUNTER (OUTPATIENT)
Dept: GENERAL RADIOLOGY | Age: 56
Discharge: HOME | End: 2021-04-30
Payer: MEDICARE

## 2021-04-28 ENCOUNTER — NURSE TRIAGE (OUTPATIENT)
Dept: OTHER | Facility: CLINIC | Age: 56
End: 2021-04-28

## 2021-04-28 VITALS
BODY MASS INDEX: 29.4 KG/M2 | WEIGHT: 210 LBS | TEMPERATURE: 98.2 F | DIASTOLIC BLOOD PRESSURE: 76 MMHG | SYSTOLIC BLOOD PRESSURE: 125 MMHG | OXYGEN SATURATION: 100 % | HEIGHT: 71 IN | HEART RATE: 85 BPM

## 2021-04-28 DIAGNOSIS — M54.50 ACUTE LEFT-SIDED LOW BACK PAIN WITHOUT SCIATICA: ICD-10-CM

## 2021-04-28 DIAGNOSIS — M25.552 LEFT HIP PAIN: ICD-10-CM

## 2021-04-28 DIAGNOSIS — M25.552 LEFT HIP PAIN: Primary | ICD-10-CM

## 2021-04-28 PROCEDURE — 3017F COLORECTAL CA SCREEN DOC REV: CPT | Performed by: FAMILY MEDICINE

## 2021-04-28 PROCEDURE — 99213 OFFICE O/P EST LOW 20 MIN: CPT | Performed by: FAMILY MEDICINE

## 2021-04-28 PROCEDURE — G8417 CALC BMI ABV UP PARAM F/U: HCPCS | Performed by: FAMILY MEDICINE

## 2021-04-28 PROCEDURE — 72202 X-RAY EXAM SI JOINTS 3/> VWS: CPT

## 2021-04-28 PROCEDURE — 73502 X-RAY EXAM HIP UNI 2-3 VIEWS: CPT

## 2021-04-28 PROCEDURE — 4004F PT TOBACCO SCREEN RCVD TLK: CPT | Performed by: FAMILY MEDICINE

## 2021-04-28 PROCEDURE — G8427 DOCREV CUR MEDS BY ELIG CLIN: HCPCS | Performed by: FAMILY MEDICINE

## 2021-04-28 ASSESSMENT — ENCOUNTER SYMPTOMS
BACK PAIN: 1
GASTROINTESTINAL NEGATIVE: 1
RESPIRATORY NEGATIVE: 1

## 2021-04-28 ASSESSMENT — PATIENT HEALTH QUESTIONNAIRE - PHQ9
SUM OF ALL RESPONSES TO PHQ QUESTIONS 1-9: 0
SUM OF ALL RESPONSES TO PHQ QUESTIONS 1-9: 0
2. FEELING DOWN, DEPRESSED OR HOPELESS: 0
SUM OF ALL RESPONSES TO PHQ9 QUESTIONS 1 & 2: 0

## 2021-04-28 NOTE — TELEPHONE ENCOUNTER
AGGRAVATING FACTORS: \"What makes the hip pain worse? \" (e.g., walking, climbing stairs, running)      Walking     8. OTHER SYMPTOMS: \"Do you have any other symptoms? \" (e.g., back pain, pain shooting down leg,  fever, rash)      Having some breathing issues, uses an inhaler and it is not effective at this time.     Protocols used: HIP PAIN-ADULT-[place]

## 2021-04-28 NOTE — PROGRESS NOTES
[address] [facility]   [place] [address]  Dept: [phone]  Dept Fax: [phone]    Bre Harris is a 63 y.o. female who presents today for her medical conditions/complaintsas noted below. Bre Harris is c/o of Hip Pain (left side hip,constant pain, gets worse with walking,x1 month)        HPI:     Hip Pain   The incident occurred more than 1 week ago (1 month). The incident occurred at home. The pain is present in the left hip and right leg (left buttock/groin). Quality: pinching. The pain is at a severity of 10/10. The pain is severe. The pain has been worsening since onset. Associated symptoms include a loss of motion. Pertinent negatives include no muscle weakness, numbness or tingling. She reports no foreign bodies present. The symptoms are aggravated by movement, weight bearing and palpation (bending, walking, standing, sitting too long). She has tried NSAIDs (percocet (takes for carpal tunnel)) for the symptoms. The treatment provided no relief. No previous injury to this hip. No prior imaging. She reports having a pinched nerve in her back once that felt similar to this.     Past Medical History:   Diagnosis Date    Abnormal Pap smear of cervix 1/17/2017    Anxiety 1/24/2017    Arthritis     left ankle, wears ankle brace    Carpal tunnel syndrome, bilateral     Hepatitis C antibody positive in blood     History of pneumonia 2013    HSV-1 (herpes simplex virus 1) infection 8/7/2017    Hyperlipidemia     Hypertension     Mood swings     due to Menopause, On Lexapro    Obesity, unspecified     Urinary frequency     Vision abnormalities     wears glasses    Past medical history reviewed and pertinent positives/negatives in the HPI    Past Surgical History:   Procedure Laterality Date    APPENDECTOMY      CARPAL TUNNEL RELEASE Right 10/06/14    CARPAL TUNNEL RELEASE Left 9/8/2015    COLONOSCOPY  COVID-19 Vaccine (1) Never done    DTaP/Tdap/Td vaccine (1 - Tdap) Never done    Shingles Vaccine (1 of 2) Never done    A1C test (Diabetic or Prediabetic)  09/04/2020    Lipid screen  09/04/2020    Cervical cancer screen  07/27/2021    Annual Wellness Visit (AWV)  07/30/2021    Low dose CT lung screening  08/19/2021    Flu vaccine (Season Ended) 09/01/2021    Breast cancer screen  09/09/2022    Colon cancer screen colonoscopy  08/25/2025    Pneumococcal 0-64 years Vaccine  Completed    Hepatitis C screen  Completed    HIV screen  Completed    Hepatitis A vaccine  Aged Out    Hepatitis B vaccine  Aged Out    Hib vaccine  Aged Out    Meningococcal (ACWY) vaccine  Aged Out       :      Review of Systems   Constitutional: Negative for chills and fever. HENT: Negative. Respiratory: Negative. Cardiovascular: Negative for leg swelling. Gastrointestinal: Negative. Musculoskeletal: Positive for arthralgias (Left hip/lower back), back pain and gait problem (Due to hip pain). Skin: Negative for pallor and rash. Neurological: Negative for tingling, weakness and numbness. Hematological: Negative for adenopathy. Objective:     Physical Exam  Vitals signs and nursing note reviewed. Constitutional:       Appearance: Normal appearance. HENT:      Head: Normocephalic and atraumatic. Right Ear: Hearing normal.      Left Ear: Hearing normal.      Mouth/Throat:      Lips: Pink. Eyes:      Extraocular Movements: Extraocular movements intact. Conjunctiva/sclera: Conjunctivae normal.   Neck:      Musculoskeletal: Normal range of motion. Cardiovascular:      Rate and Rhythm: Normal rate. Pulmonary:      Effort: Pulmonary effort is normal.   Musculoskeletal:      Left hip: She exhibits decreased range of motion (Flexion, abduction, extension), decreased strength (4/5 with flexion, extension, abduction) and tenderness (SI joint). She exhibits no swelling and no deformity. Comments: KIKI and FADIR test positive   Skin:     General: Skin is warm and dry. Neurological:      General: No focal deficit present. Mental Status: She is alert and oriented to person, place, and time. Mental status is at baseline. Psychiatric:         Mood and Affect: Mood normal.         Behavior: Behavior normal.         Thought Content: Thought content normal.         Judgment: Judgment normal.       /76   Pulse 85   Temp 98.2 °F (36.8 °C) (Infrared)   Ht 5' 11\" (1.803 m)   Wt 210 lb (95.3 kg)   LMP 06/01/2012 (Within Weeks)   SpO2 100%   BMI 29.29 kg/m²     Assessment:       Diagnosis Orders   1. Left hip pain  XR HIP LEFT (2-3 VIEWS)    XR SACROILIAC JOINTS (MIN 3 VIEWS)   2. Acute left-sided low back pain without sciatica  XR SACROILIAC JOINTS (MIN 3 VIEWS)       Plan:    Have xray of hip to rule out any bony abnormalities. If bony abnormalities seen may need referral to orthopedics  If no abnormalities seen may benefit from physical therapy and/or MRI  May take tylenol/motrin as needed for pain. May ice/heat as needed  If symptoms worsen or do not improve please follow-up with PCP or return to clinic    Orders Placed This Encounter   Procedures    XR HIP LEFT (2-3 VIEWS)     Standing Status:   Future     Standing Expiration Date:   4/28/2022     Order Specific Question:   Reason for exam:     Answer:   left hip pain    XR SACROILIAC JOINTS (MIN 3 VIEWS)     Standing Status:   Future     Standing Expiration Date:   4/28/2022     Order Specific Question:   Reason for exam:     Answer:   left hip/low back pain     No orders of the defined types were placed in this encounter. Patient given educational materials - see patient instructions. Discussed use, benefit, and side effects of prescribed medications. All patient questions answered. Pt voiced understanding. Patient agreed with treatment plan. Follow up as directed.      Electronicallysigned by Lindsay Granda MD on 4/28/2021 at 12:35 PM

## 2021-05-04 RX ORDER — PANTOPRAZOLE SODIUM 20 MG/1
TABLET, DELAYED RELEASE ORAL
Qty: 30 TABLET | Refills: 0 | Status: SHIPPED | OUTPATIENT
Start: 2021-05-04 | End: 2021-06-01

## 2021-05-07 ENCOUNTER — HOSPITAL ENCOUNTER (OUTPATIENT)
Dept: MRI IMAGING | Age: 56
Discharge: HOME | End: 2021-05-09
Payer: MEDICARE

## 2021-05-07 DIAGNOSIS — M25.552 LEFT HIP PAIN: ICD-10-CM

## 2021-05-07 DIAGNOSIS — S73.192A TEAR OF LEFT ACETABULAR LABRUM, INITIAL ENCOUNTER: Primary | ICD-10-CM

## 2021-05-07 PROCEDURE — 73721 MRI JNT OF LWR EXTRE W/O DYE: CPT

## 2021-05-12 DIAGNOSIS — M25.552 LEFT HIP PAIN: Primary | ICD-10-CM

## 2021-05-17 DIAGNOSIS — B00.1 RECURRENT COLD SORES: ICD-10-CM

## 2021-05-17 DIAGNOSIS — M25.552 HIP PAIN, ACUTE, LEFT: Primary | ICD-10-CM

## 2021-05-17 DIAGNOSIS — R93.89 ABNORMAL MRI: ICD-10-CM

## 2021-05-17 RX ORDER — VALACYCLOVIR HYDROCHLORIDE 1 G/1
TABLET, FILM COATED ORAL
Qty: 30 TABLET | Refills: 0 | Status: SHIPPED
Start: 2021-05-17 | End: 2021-05-25 | Stop reason: DRUGHIGH

## 2021-05-25 ENCOUNTER — OFFICE VISIT (OUTPATIENT)
Dept: INTERNAL MEDICINE CLINIC | Age: 56
End: 2021-05-25
Payer: MEDICARE

## 2021-05-25 VITALS
OXYGEN SATURATION: 92 % | HEART RATE: 84 BPM | DIASTOLIC BLOOD PRESSURE: 60 MMHG | TEMPERATURE: 98.4 F | HEIGHT: 71 IN | BODY MASS INDEX: 31.08 KG/M2 | WEIGHT: 222 LBS | SYSTOLIC BLOOD PRESSURE: 126 MMHG

## 2021-05-25 DIAGNOSIS — I10 HTN, GOAL BELOW 130/80: Primary | ICD-10-CM

## 2021-05-25 DIAGNOSIS — J43.9 PULMONARY EMPHYSEMA, UNSPECIFIED EMPHYSEMA TYPE (HCC): ICD-10-CM

## 2021-05-25 DIAGNOSIS — E78.5 HYPERLIPIDEMIA LDL GOAL <100: ICD-10-CM

## 2021-05-25 DIAGNOSIS — R73.03 PREDIABETES: ICD-10-CM

## 2021-05-25 DIAGNOSIS — B00.1 RECURRENT COLD SORES: ICD-10-CM

## 2021-05-25 DIAGNOSIS — M25.552 LEFT HIP PAIN: ICD-10-CM

## 2021-05-25 PROCEDURE — G8417 CALC BMI ABV UP PARAM F/U: HCPCS | Performed by: NURSE PRACTITIONER

## 2021-05-25 PROCEDURE — G8427 DOCREV CUR MEDS BY ELIG CLIN: HCPCS | Performed by: NURSE PRACTITIONER

## 2021-05-25 PROCEDURE — G8926 SPIRO NO PERF OR DOC: HCPCS | Performed by: NURSE PRACTITIONER

## 2021-05-25 PROCEDURE — 3023F SPIROM DOC REV: CPT | Performed by: NURSE PRACTITIONER

## 2021-05-25 PROCEDURE — 99214 OFFICE O/P EST MOD 30 MIN: CPT | Performed by: NURSE PRACTITIONER

## 2021-05-25 PROCEDURE — 4004F PT TOBACCO SCREEN RCVD TLK: CPT | Performed by: NURSE PRACTITIONER

## 2021-05-25 PROCEDURE — 3017F COLORECTAL CA SCREEN DOC REV: CPT | Performed by: NURSE PRACTITIONER

## 2021-05-25 RX ORDER — AMLODIPINE BESYLATE 5 MG/1
5 TABLET ORAL DAILY
Qty: 90 TABLET | Refills: 3 | Status: SHIPPED | OUTPATIENT
Start: 2021-05-25 | End: 2022-05-31

## 2021-05-25 RX ORDER — FLUTICASONE PROPIONATE 110 UG/1
1 AEROSOL, METERED RESPIRATORY (INHALATION) 2 TIMES DAILY
Qty: 1 INHALER | Refills: 3 | Status: SHIPPED
Start: 2021-05-25 | End: 2021-06-01 | Stop reason: SINTOL

## 2021-05-25 RX ORDER — VALACYCLOVIR HYDROCHLORIDE 500 MG/1
500 TABLET, FILM COATED ORAL DAILY
Qty: 30 TABLET | Refills: 3 | Status: SHIPPED | OUTPATIENT
Start: 2021-05-25 | End: 2021-09-21

## 2021-05-25 ASSESSMENT — ENCOUNTER SYMPTOMS
CHEST TIGHTNESS: 0
DIARRHEA: 0
SHORTNESS OF BREATH: 1
ABDOMINAL PAIN: 0
CONSTIPATION: 0
WHEEZING: 0
ROS SKIN COMMENTS: RECURRENT COLD SORES
COUGH: 1

## 2021-05-25 ASSESSMENT — PATIENT HEALTH QUESTIONNAIRE - PHQ9: SUM OF ALL RESPONSES TO PHQ QUESTIONS 1-9: 0

## 2021-05-25 NOTE — PROGRESS NOTES
[address]  [place] [address]  Dept: [phone]  Dept Fax: [phone]    Office Progress/Follow Up Note  Date of patient's visit: 5/25/2021   Patient's Name:  Brinda Justice  YOB: 1965            Patient Care Team:  ASHA Segura CNP as PCP - General (Internal Medicine)  ASHA Segura CNP as PCP - [facility]  Jeanna Mixon DO as Consulting Physician (Obstetrics & Gynecology)  Pelon Meléndez RN as Imaging Navigator    REASON FOR VISIT: Results (MRI left hip 5/7, has an appt 6/2 with Dr Faustino Cruz) and Discuss Medications (Pt states using inhaler more than she is supposed to)        HISTORY OF PRESENT ILLNESS:      History was obtained from the patient. Brinda Justice is a 61 y.o. is here for Results (MRI left hip 5/7, has an appt 6/2 with Dr Faustino Cruz) and Discuss Medications (Pt states using inhaler more than she is supposed to)    Patient is seen today for multiple medical concerns, including left hip pain, with tear to the anterosuperior labrum with associated paralabral cyst per MRI. She has already been referred to Ortho, states that she had an appointment with Dr. Adán Rodriguez, but was going to have to wait 2 hours to see him after getting x-rays in the office, so she left. We referred her to Dr. Michelle Ferrer, and she does have an appointment scheduled with him on June 2. She continues to have pain and decreased range of motion in the hip. Emphysema per CT lung-patient has long history of smoking, no recent PFTs, reports that she has been using rescue inhaler 4-5 times daily for increased shortness of breath, wheezing and chest tightness. Has chronic smoker's cough. Hypertension-BP at goal today, reports she is tolerating Norvasc. Recurrent cold sores-has been on Valtrex 1g for years. Has had no outbreaks while on suppressive therapy.     Patient Active Problem List   Diagnosis    Obesity    Personal history of tobacco use, presenting hazards to health    Carpal tunnel syndrome on right    History of tobacco abuse    History of ankle fracture    Hypertension    Urinary frequency    Vision abnormalities    S/P removal of right ovary    Hyperlipidemia    Carpal tunnel syndrome, bilateral    Status post carpal tunnel release of both wrists    Obesity    Arthritis    Mood swings    History of pneumonia    Abnormal Pap smear of cervix    Anxiety    Caregiver stress    HSV-1 (herpes simplex virus 1) infection    HSV-2 seropositive    HSIL (high grade squamous intraepithelial lesion) on Pap smear of cervix    Cervical stenosis (uterine cervix)    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)    Prediabetes    Hepatitis C antibody positive in blood       Allergies   Allergen Reactions    Codeine Nausea Only         MEDICATIONS:     Current Outpatient Medications   Medication Sig Dispense Refill    amLODIPine (NORVASC) 5 MG tablet Take 1 tablet by mouth daily 90 tablet 3    valACYclovir (VALTREX) 500 MG tablet Take 1 tablet by mouth daily 30 tablet 3    fluticasone (FLOVENT HFA) 110 MCG/ACT inhaler Inhale 1 puff into the lungs 2 times daily 1 Inhaler 3    pantoprazole (PROTONIX) 20 MG tablet TAKE ONE TABLET BY MOUTH DAILY 30 tablet 0    albuterol sulfate  (90 Base) MCG/ACT inhaler INHALE TWO PUFFS BY MOUTH EVERY 4 HOURS AS NEEDED FOR WHEEZING OR FOR SHORTNESS OF BREATH 8.5 g 2    topiramate (TOPAMAX) 100 MG tablet       bisacodyl (DULCOLAX) 5 MG EC tablet Use as directed per instructions given by physician office. 2 tablet 0    atorvastatin (LIPITOR) 40 MG tablet Take 1 tablet by mouth daily 90 tablet 3    Blood Pressure Monitoring ([employer] DELUXE BP MONITOR) KIT 1 each by Does not apply route daily 1 kit 0    oxyCODONE-acetaminophen (PERCOCET) 7.5-325 MG per tablet        No current facility-administered medications for this visit. SOCIAL HISTORY    Reviewed and updated. Jesus Valentine  reports that she has been smoking cigarettes.  She started eye: No discharge. Conjunctiva/sclera: Conjunctivae normal.      Pupils: Pupils are equal, round, and reactive to light. Cardiovascular:      Rate and Rhythm: Normal rate and regular rhythm. Pulses: Normal pulses. Heart sounds: Normal heart sounds. No murmur heard. Pulmonary:      Effort: Pulmonary effort is normal.      Breath sounds: Wheezing present. No rhonchi. Abdominal:      General: Bowel sounds are normal. There is no distension. Palpations: Abdomen is soft. Tenderness: There is no abdominal tenderness. Neurological:      Mental Status: She is alert and oriented to person, place, and time. Gait: Gait normal.   Psychiatric:         Mood and Affect: Mood normal.         Behavior: Behavior normal.          LABORATORY FINDINGS:    CBC:   Lab Results   Component Value Date    WBC 8.7 05/26/2021    HGB 15.3 05/26/2021     05/26/2021     BMP:   Lab Results   Component Value Date     05/26/2021    K 4.3 05/26/2021     05/26/2021    CO2 33 05/26/2021    BUN 15 05/26/2021    CREATININE 0.58 05/26/2021    GLUCOSE 91 05/26/2021     HEMOGLOBIN A1C:   Lab Results   Component Value Date    LABA1C 6.2 05/26/2021     FASTING LIPID PANEL:   Lab Results   Component Value Date    CHOL 118 05/26/2021    HDL 35 (L) 05/26/2021    LDLCHOLESTEROL 58 05/26/2021    TRIG 124 05/26/2021       ASSESSMENT AND PLAN:      Visit Diagnoses and Associated Orders     HTN, goal below 130/80    -  Primary    Controlled, continue Norvasc, DASH diet, regular exercise, advised to quit smoking. Check labs.     amLODIPine (NORVASC) 5 MG tablet [[id number]]      CBC Auto Differential [id number] Custom]   - Future Order    Comprehensive Metabolic Panel [[id number] Custom]   - Future Order    TSH with Reflex [[id number] Custom]   - Future Order    Urinalysis [[id number] Custom]   - Future Order         Hyperlipidemia LDL goal <100        On statin, due for lipid panel    Lipid Panel [[id number] Custom]   - Future Order Prediabetes        Diet controlled, weight trending up, check A1c    Hemoglobin A1C [[id number] Custom]   - Future Order         Recurrent cold sores        Will attempt dose reduction of suppressive therapy, patient to call if she develops symptoms of herpes outbreak. valACYclovir (VALTREX) 500 MG tablet [[id number]]           Pulmonary emphysema, unspecified emphysema type ([facility] [address]        Poor control, add Flovent, continue rescue inhaler prn. Advised on smoking cessation. Full PFT Study With Bronchodilator [id number] Custom]   - Future Order    fluticasone (FLOVENT HFA) 110 MCG/ACT inhaler [[id number]]           Left hip pain        Follow up with ortho as scheduled. FOLLOW UP AND INSTRUCTIONS:     Return in about 3 months (around 8/25/2021) for chronic conditions, or sooner if needed. · [facility] received counseling on the following healthy behaviors: nutrition, exercise, medication adherence and tobacco cessation    · Discussed use, benefit, and side effects of prescribed medications. Barriers to medication compliance addressed. All patient questions answered. Pt voiced understanding. · Patient given educational materials - see patient instructions    ASHA Leahy - CNP    5/26/2021, 3:20 PM    Please note that this chart was generated using voice recognition Dragon dictation software. Although every effort was made to ensure the accuracy of this automatedtranscription, some errors in transcription may have occurred.

## 2021-05-25 NOTE — PATIENT INSTRUCTIONS
can.  How is COPD treated? COPD is treated with medicines and oxygen. You also can take steps at home to stay healthy and keep your COPD from getting worse. Medicines and oxygen therapy  · You may be taking medicines such as:  ? Bronchodilators. These help open your airways and make breathing easier. Bronchodilators are either short-acting (work for 6 to 9 hours) or long-acting (work for 24 hours). You inhale most bronchodilators, so they start to act quickly. Always carry your quick-relief inhaler with you in case you need it while you are away from home. ? Corticosteroids. These reduce airway inflammation. They come in pill or inhaled form. You must take these medicines every day for them to work well. · Take your medicines exactly as prescribed. Call your doctor if you think you are having a problem with your medicine. · Oxygen therapy boosts the amount of oxygen in your blood and helps you breathe easier. Use the flow rate your doctor has recommended, and do not change it without talking to your doctor first.  Other care at home  · If your doctor recommends it, get more exercise. Walking is a good choice. Bit by bit, increase the amount you walk every day. Try for at least 30 minutes on most days of the week. · Learn breathing methodssuch as breathing through pursed lipsto help you become less short of breath. · If your doctor has not set you up with a pulmonary rehabilitation program, talk to him or her about whether rehab is right for you. Rehab includes exercise programs, education about your disease and how to manage it, help with diet and other changes, and emotional support. · Eat regular, healthy meals. Use bronchodilators about 1 hour before you eat to make it easier to eat. Eat several small meals instead of three large ones. Drink beverages at the end of the meal. Avoid foods that are hard to chew. Follow-up care is a key part of your treatment and safety.  Be sure to make and go to all appointments, and call your doctor if you are having problems. It's also a good idea to know your test results and keep a list of the medicines you take. Where can you learn more? Go to [email or web address] org and sign in to your [employer] account. Enter V314 in the [facility] box to learn more about \"Learning About COPD. \"     If you do not have an account, please click on the \"Sign Up Now\" link. Current as of: October 26, 2020               Content Version: 12.8  © 6699-8391 Healthwise, Incorporated. Care instructions adapted under license by [facility] [address] If you have questions about a medical condition or this instruction, always ask your healthcare professional. [address] any warranty or liability for your use of this information.

## 2021-05-26 ENCOUNTER — HOSPITAL ENCOUNTER (OUTPATIENT)
Age: 56
Discharge: HOME | End: 2021-05-26
Payer: MEDICARE

## 2021-05-26 DIAGNOSIS — R73.03 PREDIABETES: ICD-10-CM

## 2021-05-26 DIAGNOSIS — E78.5 HYPERLIPIDEMIA LDL GOAL <100: ICD-10-CM

## 2021-05-26 DIAGNOSIS — I10 HTN, GOAL BELOW 130/80: ICD-10-CM

## 2021-05-26 LAB
ABSOLUTE EOS #: 0.1 K/UL (ref 0–0.4)
ABSOLUTE IMMATURE GRANULOCYTE: ABNORMAL K/UL (ref 0–0.3)
ABSOLUTE LYMPH #: 2.1 K/UL (ref 1–4.8)
ABSOLUTE MONO #: 0.7 K/UL (ref 0.1–1.3)
ALBUMIN SERPL-MCNC: 4.2 G/DL (ref 3.5–5.2)
ALBUMIN/GLOBULIN RATIO: ABNORMAL (ref 1–2.5)
ALP BLD-CCNC: 80 U/L (ref 35–104)
ALT SERPL-CCNC: 15 U/L (ref 5–33)
ANION GAP SERPL CALCULATED.3IONS-SCNC: 6 MMOL/L (ref 9–17)
AST SERPL-CCNC: 15 U/L
BASOPHILS # BLD: 0 % (ref 0–2)
BASOPHILS ABSOLUTE: 0 K/UL (ref 0–0.2)
BILIRUB SERPL-MCNC: 0.31 MG/DL (ref 0.3–1.2)
BILIRUBIN URINE: NEGATIVE
BUN BLDV-MCNC: 15 MG/DL (ref 6–20)
BUN/CREAT BLD: ABNORMAL (ref 9–20)
CALCIUM SERPL-MCNC: 9.3 MG/DL (ref 8.6–10.4)
CHLORIDE BLD-SCNC: 100 MMOL/L (ref 98–107)
CHOLESTEROL/HDL RATIO: 3.4
CHOLESTEROL: 118 MG/DL
CO2: 33 MMOL/L (ref 20–31)
COLOR: YELLOW
COMMENT UA: NORMAL
CREAT SERPL-MCNC: 0.58 MG/DL (ref 0.5–0.9)
DIFFERENTIAL TYPE: ABNORMAL
EOSINOPHILS RELATIVE PERCENT: 1 % (ref 0–4)
ESTIMATED AVERAGE GLUCOSE: 131 MG/DL
GFR AFRICAN AMERICAN: >60 ML/MIN
GFR NON-AFRICAN AMERICAN: >60 ML/MIN
GFR SERPL CREATININE-BSD FRML MDRD: ABNORMAL ML/MIN/{1.73_M2}
GFR SERPL CREATININE-BSD FRML MDRD: ABNORMAL ML/MIN/{1.73_M2}
GLUCOSE BLD-MCNC: 91 MG/DL (ref 70–99)
GLUCOSE URINE: NEGATIVE
HBA1C MFR BLD: 6.2 % (ref 4–6)
HCT VFR BLD CALC: 46.2 % (ref 36–46)
HDLC SERPL-MCNC: 35 MG/DL
HEMOGLOBIN: 15.3 G/DL (ref 12–16)
IMMATURE GRANULOCYTES: ABNORMAL %
KETONES, URINE: NEGATIVE
LDL CHOLESTEROL: 58 MG/DL (ref 0–130)
LEUKOCYTE ESTERASE, URINE: NEGATIVE
LYMPHOCYTES # BLD: 24 % (ref 24–44)
MCH RBC QN AUTO: 30 PG (ref 26–34)
MCHC RBC AUTO-ENTMCNC: 33.1 G/DL (ref 31–37)
MCV RBC AUTO: 90.7 FL (ref 80–100)
MONOCYTES # BLD: 8 % (ref 1–7)
NITRITE, URINE: NEGATIVE
NRBC AUTOMATED: ABNORMAL PER 100 WBC
PDW BLD-RTO: 14.6 % (ref 11.5–14.9)
PH UA: 8 (ref 5–8)
PLATELET # BLD: 274 K/UL (ref 150–450)
PLATELET ESTIMATE: ABNORMAL
PMV BLD AUTO: 8 FL (ref 6–12)
POTASSIUM SERPL-SCNC: 4.3 MMOL/L (ref 3.7–5.3)
PROTEIN UA: NEGATIVE
RBC # BLD: 5.1 M/UL (ref 4–5.2)
RBC # BLD: ABNORMAL 10*6/UL
SEG NEUTROPHILS: 67 % (ref 36–66)
SEGMENTED NEUTROPHILS ABSOLUTE COUNT: 5.8 K/UL (ref 1.3–9.1)
SODIUM BLD-SCNC: 139 MMOL/L (ref 135–144)
SPECIFIC GRAVITY UA: 1.02 (ref 1–1.03)
TOTAL PROTEIN: 7.2 G/DL (ref 6.4–8.3)
TRIGL SERPL-MCNC: 124 MG/DL
TSH SERPL DL<=0.05 MIU/L-ACNC: 2.24 MIU/L (ref 0.3–5)
TURBIDITY: CLEAR
URINE HGB: NEGATIVE
UROBILINOGEN, URINE: NORMAL
VLDLC SERPL CALC-MCNC: ABNORMAL MG/DL (ref 1–30)
WBC # BLD: 8.7 K/UL (ref 3.5–11)
WBC # BLD: ABNORMAL 10*3/UL

## 2021-05-26 PROCEDURE — 80061 LIPID PANEL: CPT

## 2021-05-26 PROCEDURE — 81003 URINALYSIS AUTO W/O SCOPE: CPT

## 2021-05-26 PROCEDURE — 85025 COMPLETE CBC W/AUTO DIFF WBC: CPT

## 2021-05-26 PROCEDURE — 83036 HEMOGLOBIN GLYCOSYLATED A1C: CPT

## 2021-05-26 PROCEDURE — 84443 ASSAY THYROID STIM HORMONE: CPT

## 2021-05-26 PROCEDURE — 36415 COLL VENOUS BLD VENIPUNCTURE: CPT

## 2021-05-26 PROCEDURE — 80053 COMPREHEN METABOLIC PANEL: CPT

## 2021-05-26 ASSESSMENT — ENCOUNTER SYMPTOMS
SORE THROAT: 0
EYE REDNESS: 0

## 2021-05-28 ENCOUNTER — TELEPHONE (OUTPATIENT)
Dept: INTERNAL MEDICINE CLINIC | Age: 56
End: 2021-05-28

## 2021-05-28 DIAGNOSIS — M25.552 HIP PAIN, ACUTE, LEFT: Primary | ICD-10-CM

## 2021-05-28 DIAGNOSIS — R93.89 ABNORMAL MRI: ICD-10-CM

## 2021-05-28 NOTE — TELEPHONE ENCOUNTER
Patient was referred to Ortho Dr Flako Aaron for a tear in left hip, they informed her the Dr does not do procedures on the lower half of the body. Patient is requesting new referral / alterative to Ortho, please advise.

## 2021-06-01 ENCOUNTER — TELEPHONE (OUTPATIENT)
Dept: INTERNAL MEDICINE CLINIC | Age: 56
End: 2021-06-01

## 2021-06-01 DIAGNOSIS — J43.9 PULMONARY EMPHYSEMA, UNSPECIFIED EMPHYSEMA TYPE (HCC): Primary | ICD-10-CM

## 2021-06-01 RX ORDER — ATORVASTATIN CALCIUM 40 MG/1
TABLET, FILM COATED ORAL
Qty: 90 TABLET | Refills: 2 | Status: SHIPPED | OUTPATIENT
Start: 2021-06-01 | End: 2022-03-14

## 2021-06-01 NOTE — TELEPHONE ENCOUNTER
Have her try Advair & remind her to rinse her mouth after use.   Orders Placed This Encounter   Medications    fluticasone-salmeterol (ADVAIR HFA) 45-21 MCG/ACT inhaler     Sig: Inhale 2 puffs into the lungs 2 times daily     Dispense:  1 Inhaler     Refill:  3

## 2021-06-01 NOTE — TELEPHONE ENCOUNTER
You prescribed her a Flovent Inhaler. This makes her throat very sore. She was asklng if you could prescribe a different inhaler or please advise.     Allergies   Allergen Reactions    Codeine Nausea Only

## 2021-06-02 NOTE — TELEPHONE ENCOUNTER
Called and spoke with patient, I advised that an a referral was placed by a different provider for Dr. Koko Grant, but she said that she felt he was too busy for her and did not have time for her. After discussing with the patient, she had carpal tunnel release with Dr. Kam Woodward and was agreeable to going back to see him. Referral updated and sent.  Advised pt to call their office to schedule an appt

## 2021-06-14 ENCOUNTER — TELEPHONE (OUTPATIENT)
Dept: INTERNAL MEDICINE CLINIC | Age: 56
End: 2021-06-14

## 2021-06-14 DIAGNOSIS — F17.200 CURRENT EVERY DAY SMOKER: Primary | ICD-10-CM

## 2021-06-14 NOTE — TELEPHONE ENCOUNTER
Pt called & stated Leopoldo Cadena offered to send Chantix to pts pharmacy to help quit smoking at her visit w/ Leopoldo Cadena on 5/25/21. Pt stated she thought abt it and would like Leopoldo Cadena to send.

## 2021-06-15 ENCOUNTER — OFFICE VISIT (OUTPATIENT)
Dept: ORTHOPEDIC SURGERY | Age: 56
End: 2021-06-15
Payer: MEDICARE

## 2021-06-15 VITALS — WEIGHT: 219 LBS | BODY MASS INDEX: 32.44 KG/M2 | HEIGHT: 69 IN

## 2021-06-15 DIAGNOSIS — S73.192A TEAR OF LEFT ACETABULAR LABRUM, INITIAL ENCOUNTER: Primary | ICD-10-CM

## 2021-06-15 PROCEDURE — G8417 CALC BMI ABV UP PARAM F/U: HCPCS | Performed by: ORTHOPAEDIC SURGERY

## 2021-06-15 PROCEDURE — G8427 DOCREV CUR MEDS BY ELIG CLIN: HCPCS | Performed by: ORTHOPAEDIC SURGERY

## 2021-06-15 PROCEDURE — 99211 OFF/OP EST MAY X REQ PHY/QHP: CPT | Performed by: ORTHOPAEDIC SURGERY

## 2021-06-15 RX ORDER — VARENICLINE TARTRATE
KIT
Qty: 1 BOX | Refills: 0 | Status: SHIPPED | OUTPATIENT
Start: 2021-06-15 | End: 2021-10-05 | Stop reason: ALTCHOICE

## 2021-06-15 NOTE — PROGRESS NOTES
Chief Complaint   Patient presents with    New Patient     Left hip Labral tear     This patient is still continued to have pain in her left hip. The pain is felt in the groin. The patient did have an MRI which did show a labral tear. I discussed with her that there is a tear and which require surgical treatment. I am referring her to Kaylyn Garcia who does arthroscopic repair of the labral tear. I did asked the patient to collect her CD of the MRI and x-rays from [facility]. I will see her as needed.

## 2021-06-15 NOTE — TELEPHONE ENCOUNTER
Sent to [place]. Please remind her to pick a quit date, and call for a continuation pack when she is nearly done with the starter pack.

## 2021-06-15 NOTE — TELEPHONE ENCOUNTER
Writer spoke w/ pt per Julia's request & informed her that she needs to pick a quit date and also to contact our office for a continuation pack when she is nearly done with the started pack. Pt voiced understanding.

## 2021-07-27 ENCOUNTER — TELEPHONE (OUTPATIENT)
Dept: ONCOLOGY | Age: 56
End: 2021-07-27

## 2021-07-27 DIAGNOSIS — Z87.891 PERSONAL HISTORY OF NICOTINE DEPENDENCE: Primary | ICD-10-CM

## 2021-07-27 NOTE — TELEPHONE ENCOUNTER
Our records indicate that your patient is due for their annual lung cancer screening follow up testing. For your convenience, we have pended the order for the scan for you. If you do not agree with the need for the test, please cancel the order and let us know. Sincerely,    [address] [place] [address] Screening Program    Auto printed reminder letter sent to patient.

## 2021-09-07 RX ORDER — PANTOPRAZOLE SODIUM 20 MG/1
TABLET, DELAYED RELEASE ORAL
Qty: 30 TABLET | Refills: 0 | Status: SHIPPED | OUTPATIENT
Start: 2021-09-07 | End: 2021-10-05 | Stop reason: SDUPTHER

## 2021-09-21 DIAGNOSIS — B00.1 RECURRENT COLD SORES: ICD-10-CM

## 2021-09-21 RX ORDER — VALACYCLOVIR HYDROCHLORIDE 500 MG/1
TABLET, FILM COATED ORAL
Qty: 30 TABLET | Refills: 3 | Status: SHIPPED | OUTPATIENT
Start: 2021-09-21 | End: 2022-03-01

## 2021-10-05 ENCOUNTER — OFFICE VISIT (OUTPATIENT)
Dept: INTERNAL MEDICINE CLINIC | Age: 56
End: 2021-10-05
Payer: MEDICARE

## 2021-10-05 VITALS
WEIGHT: 225 LBS | BODY MASS INDEX: 33.23 KG/M2 | SYSTOLIC BLOOD PRESSURE: 124 MMHG | DIASTOLIC BLOOD PRESSURE: 72 MMHG | OXYGEN SATURATION: 93 % | HEART RATE: 88 BPM

## 2021-10-05 DIAGNOSIS — I70.0 ATHEROSCLEROSIS OF AORTA (HCC): ICD-10-CM

## 2021-10-05 DIAGNOSIS — G89.29 CHRONIC HAND PAIN, UNSPECIFIED LATERALITY: ICD-10-CM

## 2021-10-05 DIAGNOSIS — F17.200 CURRENT EVERY DAY SMOKER: ICD-10-CM

## 2021-10-05 DIAGNOSIS — I10 PRIMARY HYPERTENSION: ICD-10-CM

## 2021-10-05 DIAGNOSIS — K21.9 GASTROESOPHAGEAL REFLUX DISEASE, UNSPECIFIED WHETHER ESOPHAGITIS PRESENT: ICD-10-CM

## 2021-10-05 DIAGNOSIS — B00.1 RECURRENT COLD SORES: ICD-10-CM

## 2021-10-05 DIAGNOSIS — M25.552 CHRONIC LEFT HIP PAIN: ICD-10-CM

## 2021-10-05 DIAGNOSIS — R73.03 PREDIABETES: Primary | ICD-10-CM

## 2021-10-05 DIAGNOSIS — M79.643 CHRONIC HAND PAIN, UNSPECIFIED LATERALITY: ICD-10-CM

## 2021-10-05 DIAGNOSIS — G89.29 CHRONIC LEFT HIP PAIN: ICD-10-CM

## 2021-10-05 LAB — HBA1C MFR BLD: 6.1 %

## 2021-10-05 PROCEDURE — 83036 HEMOGLOBIN GLYCOSYLATED A1C: CPT | Performed by: NURSE PRACTITIONER

## 2021-10-05 PROCEDURE — 4004F PT TOBACCO SCREEN RCVD TLK: CPT | Performed by: NURSE PRACTITIONER

## 2021-10-05 PROCEDURE — G8484 FLU IMMUNIZE NO ADMIN: HCPCS | Performed by: NURSE PRACTITIONER

## 2021-10-05 PROCEDURE — G8417 CALC BMI ABV UP PARAM F/U: HCPCS | Performed by: NURSE PRACTITIONER

## 2021-10-05 PROCEDURE — G8427 DOCREV CUR MEDS BY ELIG CLIN: HCPCS | Performed by: NURSE PRACTITIONER

## 2021-10-05 PROCEDURE — 99214 OFFICE O/P EST MOD 30 MIN: CPT | Performed by: NURSE PRACTITIONER

## 2021-10-05 PROCEDURE — 3017F COLORECTAL CA SCREEN DOC REV: CPT | Performed by: NURSE PRACTITIONER

## 2021-10-05 RX ORDER — PANTOPRAZOLE SODIUM 20 MG/1
20 TABLET, DELAYED RELEASE ORAL DAILY
Qty: 90 TABLET | Refills: 1 | Status: SHIPPED | OUTPATIENT
Start: 2021-10-05 | End: 2022-04-05 | Stop reason: ALTCHOICE

## 2021-10-05 RX ORDER — B-COMPLEX WITH VITAMIN C
1 TABLET ORAL 2 TIMES DAILY
Qty: 180 TABLET | Refills: 3 | Status: SHIPPED | OUTPATIENT
Start: 2021-10-05 | End: 2022-04-05 | Stop reason: ALTCHOICE

## 2021-10-05 SDOH — ECONOMIC STABILITY: FOOD INSECURITY: WITHIN THE PAST 12 MONTHS, THE FOOD YOU BOUGHT JUST DIDN'T LAST AND YOU DIDN'T HAVE MONEY TO GET MORE.: NEVER TRUE

## 2021-10-05 SDOH — ECONOMIC STABILITY: FOOD INSECURITY: WITHIN THE PAST 12 MONTHS, YOU WORRIED THAT YOUR FOOD WOULD RUN OUT BEFORE YOU GOT MONEY TO BUY MORE.: NEVER TRUE

## 2021-10-05 ASSESSMENT — ENCOUNTER SYMPTOMS
WHEEZING: 0
SHORTNESS OF BREATH: 0
TROUBLE SWALLOWING: 0
CONSTIPATION: 0
COUGH: 0
ABDOMINAL PAIN: 0

## 2021-10-05 ASSESSMENT — SOCIAL DETERMINANTS OF HEALTH (SDOH): HOW HARD IS IT FOR YOU TO PAY FOR THE VERY BASICS LIKE FOOD, HOUSING, MEDICAL CARE, AND HEATING?: NOT HARD AT ALL

## 2021-10-05 NOTE — PROGRESS NOTES
Visit Information    Have you changed or started any medications since your last visit including any over-the-counter medicines, vitamins, or herbal medicines? no   Are you having any side effects from any of your medications? -  no  Have you stopped taking any of your medications? Is so, why? -  no    Have you seen any other physician or provider since your last visit? Yes pain management  Have you had any other diagnostic tests since your last visit? No  Have you been seen in the emergency room and/or had an admission to a hospital since we last saw you? No  Have you had your routine dental cleaning in the past 6 months? No, dentures  Have you activated your [employer] account? If not, what are your barriers?  No: not computer scotty     Patient Care Team:  ASHA Dee CNP as PCP - General (Internal Medicine)  ASHA Dee CNP as PCP - [facility]  Arti Dickey DO as Consulting Physician (Obstetrics & Gynecology)  Veronica Ferrell RN as Imaging Navigator    Medical History Review  Past Medical, Family, and Social History reviewed and does contribute to the patient presenting condition    Health Maintenance   Topic Date Due    COVID-19 Vaccine (1) Never done    DTaP/Tdap/Td vaccine (1 - Tdap) Never done    Shingles Vaccine (1 of 2) Never done    Annual Wellness Visit (AWV)  07/30/2021    Low dose CT lung screening  08/19/2021    Flu vaccine (1) 10/05/2022 (Originally 9/1/2021)    Lipid screen  05/26/2022    Breast cancer screen  09/09/2022    A1C test (Diabetic or Prediabetic)  10/05/2022    Cervical cancer screen  07/27/2025    Colon cancer screen colonoscopy  08/25/2025    Pneumococcal 0-64 years Vaccine (2 of 2 - PPSV23) 05/06/2029    Hepatitis C screen  Completed    HIV screen  Completed    Hepatitis A vaccine  Aged Out    Hepatitis B vaccine  Aged Out    Hib vaccine  Aged Out    Meningococcal (ACWY) vaccine  Aged Out         [facility]  [facility]  [address] [facility]  Dept: [phone]  Dept Fax: [phone]    Office Progress/Follow Up Note  Date of patient's visit: 10/5/2021   Patient's Name:  Zeyad Galdamez  YOB: 1965            Patient Care Team:  AHSA Desai CNP as PCP - General (Internal Medicine)  ASHA Desai CNP as PCP - [facility] Empaneled Provider  Maik Escalera DO as Consulting Physician (Obstetrics & Gynecology)  Denice Castañeda RN as Imaging Navigator    REASON FOR VISIT: Diabetes (3mo f/u), Other (Needs new handicap placard), Gastroesophageal Reflux, and Hypertension        HISTORY OF PRESENT ILLNESS:      History was obtained from the patient. Zeyad Galdamez is a 61 y.o. is here for routine follow up of chronic conditions:  Diabetes (3mo f/u), Other (Needs new handicap placard), Gastroesophageal Reflux, and Hypertension    Current smoker  Prediabetes-Diet-eats a lot of veggies, no pop. Not on meds. HTN-no exercise, watches salt. Compliant with meds. GERD-on PPI, asymptomatic. She has seen ortho for left hip pain/acetabular labrum tear.      Patient Active Problem List   Diagnosis    Obesity    Personal history of tobacco use, presenting hazards to health    Carpal tunnel syndrome on right    History of tobacco abuse    History of ankle fracture    Hypertension    Urinary frequency    Vision abnormalities    S/P removal of right ovary    Hyperlipidemia    Carpal tunnel syndrome, bilateral    Status post carpal tunnel release of both wrists    Obesity    Arthritis    Mood swings    History of pneumonia    Abnormal Pap smear of cervix    Anxiety    Caregiver stress    HSV-1 (herpes simplex virus 1) infection    HSV-2 seropositive    HSIL (high grade squamous intraepithelial lesion) on Pap smear of cervix    Cervical stenosis (uterine cervix)    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)    Prediabetes    Hepatitis C antibody positive in blood    Tear of left acetabular labrum    Atherosclerosis of aorta (HCC)       Allergies   Allergen Reactions    Codeine Nausea Only         MEDICATIONS:     Current Outpatient Medications   Medication Sig Dispense Refill    Calcium Carbonate-Vitamin D (OYSTER SHELL CALCIUM/D) 500-200 MG-UNIT TABS Take 1 tablet by mouth 2 times daily 180 tablet 3    pantoprazole (PROTONIX) 20 MG tablet Take 1 tablet by mouth daily 90 tablet 1    valACYclovir (VALTREX) 500 MG tablet TAKE ONE TABLET BY MOUTH DAILY 30 tablet 3    atorvastatin (LIPITOR) 40 MG tablet TAKE ONE TABLET BY MOUTH DAILY 90 tablet 2    fluticasone-salmeterol (ADVAIR HFA) 45-21 MCG/ACT inhaler Inhale 2 puffs into the lungs 2 times daily 1 Inhaler 3    amLODIPine (NORVASC) 5 MG tablet Take 1 tablet by mouth daily 90 tablet 3    albuterol sulfate  (90 Base) MCG/ACT inhaler INHALE TWO PUFFS BY MOUTH EVERY 4 HOURS AS NEEDED FOR WHEEZING OR FOR SHORTNESS OF BREATH 8.5 g 2    topiramate (TOPAMAX) 100 MG tablet       bisacodyl (DULCOLAX) 5 MG EC tablet Use as directed per instructions given by physician office. 2 tablet 0    Blood Pressure Monitoring ([employer] DELUXE BP MONITOR) KIT 1 each by Does not apply route daily 1 kit 0    oxyCODONE-acetaminophen (PERCOCET) 7.5-325 MG per tablet        No current facility-administered medications for this visit. SOCIAL HISTORY    Reviewed and updated. Jose Strong  reports that she has been smoking cigarettes. She started smoking about 45 years ago. She has a 43.00 pack-year smoking history. She has never used smokeless tobacco.    FAMILY HISTORY:    Reviewed and updated. family history includes Cancer in her brother; Diabetes in her brother and mother; Heart Disease in her father, mother, and sister; High Blood Pressure in her mother; Hypertension in her brother; Other in her brother; Stroke in her mother. REVIEW OF SYSTEMS:      Review of Systems   Constitutional: Positive for fatigue. Negative for chills and fever. HENT: Negative for congestion and trouble swallowing. Eyes: Negative for visual disturbance. Respiratory: Negative for cough, shortness of breath and wheezing. Using rescue inhaler rarely   Cardiovascular: Negative for chest pain, palpitations and leg swelling. Gastrointestinal: Negative for abdominal pain and constipation. Genitourinary: Negative for difficulty urinating and hematuria. Musculoskeletal: Positive for arthralgias and gait problem. Skin:        No outbreaks since Valtrex dose decreased   Neurological: Negative for dizziness and syncope. Psychiatric/Behavioral: Negative for sleep disturbance. The patient is nervous/anxious. PHYSICAL EXAM:      Vitals:    10/05/21 0945   BP: 124/72   Pulse: 88   SpO2: 93%   Weight: 225 lb (102.1 kg)     BP Readings from Last 3 Encounters:   10/05/21 124/72   05/25/21 126/60   04/28/21 125/76        Physical Exam  Constitutional:       General: She is not in acute distress. Appearance: She is well-developed. She is obese. She is not ill-appearing. HENT:      Head: Normocephalic and atraumatic. Right Ear: External ear normal.      Left Ear: External ear normal.      Nose: Nose normal.   Eyes:      General:         Right eye: No discharge. Left eye: No discharge. Conjunctiva/sclera: Conjunctivae normal.      Pupils: Pupils are equal, round, and reactive to light. Cardiovascular:      Rate and Rhythm: Normal rate and regular rhythm. Pulses: Normal pulses. Heart sounds: Normal heart sounds. No murmur heard. Pulmonary:      Effort: Pulmonary effort is normal.      Breath sounds: Normal breath sounds. No wheezing or rhonchi. Abdominal:      General: Bowel sounds are normal. There is no distension. Palpations: Abdomen is soft. Tenderness: There is no abdominal tenderness. Musculoskeletal:      Left hand: Tenderness present. Decreased range of motion.       Cervical back: Normal range of motion and neck supple. No tenderness. Normal range of motion. Thoracic back: No tenderness. Lumbar back: No tenderness. Normal range of motion. Right hip: Normal.      Left hip: Tenderness present. Decreased range of motion. Right lower leg: No edema. Left lower leg: No edema. Lymphadenopathy:      Cervical: No cervical adenopathy. Skin:     General: Skin is warm and dry. Neurological:      Mental Status: She is alert and oriented to person, place, and time.       Gait: Gait normal.   Psychiatric:         Mood and Affect: Mood normal.         Behavior: Behavior normal.          LABORATORY FINDINGS:    CBC:   Lab Results   Component Value Date    WBC 8.7 05/26/2021    HGB 15.3 05/26/2021     05/26/2021     BMP:   Lab Results   Component Value Date     05/26/2021    K 4.3 05/26/2021     05/26/2021    CO2 33 05/26/2021    BUN 15 05/26/2021    CREATININE 0.58 05/26/2021    GLUCOSE 91 05/26/2021     HEMOGLOBIN A1C:   Lab Results   Component Value Date    LABA1C 6.1 10/05/2021     FASTING LIPID PANEL:   Lab Results   Component Value Date    CHOL 118 05/26/2021    HDL 35 (L) 05/26/2021    LDLCHOLESTEROL 58 05/26/2021    TRIG 124 05/26/2021       ASSESSMENT AND PLAN:      Visit Diagnoses and Associated Orders     Prediabetes    -  Primary    A1C 6.1, continue healthy diet, regular exercise, advised to lose weight    POCT glycosylated hemoglobin (Hb A1C) [[id number] Custom]           Chronic left hip pain        Patient to schedule follow up with orthopedic surgeon for continued pain with labrum tear         Gastroesophageal reflux disease, unspecified whether esophagitis present        Stable, on PPI, Advised on smoking cessation, need for calcium supplementation    Calcium Carbonate-Vitamin D (OYSTER SHELL CALCIUM/D) 500-200 MG-UNIT TABS [[id number]]      pantoprazole (PROTONIX) 20 MG tablet [[id number]]           Primary hypertension        Controlled, continue Norvasc, DASH diet, advised on

## 2021-10-05 NOTE — LETTER
[facility]  [address] [facility] [address] [place] [id number]  Phone: [phone]  Fax: [phone], APRN - CNP         October 5, 2021     Patient: Susanne Willis   YOB: 1965   Date of Visit: 10/5/2021       To Whom It May Concern: It is my medical opinion that Kell Penaloza requires a disability parking placard for the following reasons:  She cannot walk 200 feet without stopping to rest.  Duration of need: permanent    If you have any questions or concerns, please don't hesitate to call.     Sincerely,    Electronically signed by ASHA Ren CNP on 10/5/2021 at 10:18 AM

## 2021-10-16 PROBLEM — I70.0 ATHEROSCLEROSIS OF AORTA (HCC): Status: ACTIVE | Noted: 2021-10-16

## 2021-11-16 DIAGNOSIS — J43.9 PULMONARY EMPHYSEMA, UNSPECIFIED EMPHYSEMA TYPE (HCC): ICD-10-CM

## 2021-11-16 RX ORDER — FLUTICASONE PROPIONATE AND SALMETEROL XINAFOATE 45; 21 UG/1; UG/1
AEROSOL, METERED RESPIRATORY (INHALATION)
Qty: 12 G | Refills: 2 | Status: SHIPPED
Start: 2021-11-16 | End: 2022-04-05 | Stop reason: ALTCHOICE

## 2022-02-25 DIAGNOSIS — B00.1 RECURRENT COLD SORES: ICD-10-CM

## 2022-03-01 RX ORDER — VALACYCLOVIR HYDROCHLORIDE 500 MG/1
TABLET, FILM COATED ORAL
Qty: 30 TABLET | Refills: 3 | Status: SHIPPED | OUTPATIENT
Start: 2022-03-01 | End: 2022-07-05 | Stop reason: SDUPTHER

## 2022-03-02 DIAGNOSIS — R06.02 SHORTNESS OF BREATH: ICD-10-CM

## 2022-03-03 RX ORDER — ALBUTEROL SULFATE 90 UG/1
AEROSOL, METERED RESPIRATORY (INHALATION)
Qty: 8.5 G | Refills: 2 | Status: SHIPPED | OUTPATIENT
Start: 2022-03-03 | End: 2022-10-06

## 2022-03-14 RX ORDER — ATORVASTATIN CALCIUM 40 MG/1
TABLET, FILM COATED ORAL
Qty: 90 TABLET | Refills: 1 | Status: SHIPPED | OUTPATIENT
Start: 2022-03-14 | End: 2022-09-15

## 2022-04-04 RX ORDER — LIDOCAINE HYDROCHLORIDE 10 MG/ML
INJECTION, SOLUTION INFILTRATION; PERINEURAL
COMMUNITY
End: 2022-04-05 | Stop reason: ALTCHOICE

## 2022-04-04 RX ORDER — FLUTICASONE PROPIONATE 110 UG/1
AEROSOL, METERED RESPIRATORY (INHALATION)
COMMUNITY
End: 2022-04-05

## 2022-04-05 ENCOUNTER — TELEPHONE (OUTPATIENT)
Dept: INTERNAL MEDICINE CLINIC | Age: 57
End: 2022-04-05

## 2022-04-05 ENCOUNTER — OFFICE VISIT (OUTPATIENT)
Dept: INTERNAL MEDICINE CLINIC | Age: 57
End: 2022-04-05
Payer: MEDICARE

## 2022-04-05 VITALS
BODY MASS INDEX: 33.77 KG/M2 | HEART RATE: 87 BPM | HEIGHT: 69 IN | OXYGEN SATURATION: 94 % | WEIGHT: 228 LBS | DIASTOLIC BLOOD PRESSURE: 86 MMHG | SYSTOLIC BLOOD PRESSURE: 136 MMHG

## 2022-04-05 DIAGNOSIS — I10 PRIMARY HYPERTENSION: ICD-10-CM

## 2022-04-05 DIAGNOSIS — J43.9 PULMONARY EMPHYSEMA, UNSPECIFIED EMPHYSEMA TYPE (HCC): Primary | ICD-10-CM

## 2022-04-05 DIAGNOSIS — M79.643 CHRONIC HAND PAIN, UNSPECIFIED LATERALITY: ICD-10-CM

## 2022-04-05 DIAGNOSIS — I70.0 ATHEROSCLEROSIS OF AORTA (HCC): ICD-10-CM

## 2022-04-05 DIAGNOSIS — G89.29 CHRONIC HAND PAIN, UNSPECIFIED LATERALITY: ICD-10-CM

## 2022-04-05 DIAGNOSIS — F17.200 CURRENT EVERY DAY SMOKER: ICD-10-CM

## 2022-04-05 PROCEDURE — 3017F COLORECTAL CA SCREEN DOC REV: CPT | Performed by: NURSE PRACTITIONER

## 2022-04-05 PROCEDURE — G8417 CALC BMI ABV UP PARAM F/U: HCPCS | Performed by: NURSE PRACTITIONER

## 2022-04-05 PROCEDURE — 3023F SPIROM DOC REV: CPT | Performed by: NURSE PRACTITIONER

## 2022-04-05 PROCEDURE — 4004F PT TOBACCO SCREEN RCVD TLK: CPT | Performed by: NURSE PRACTITIONER

## 2022-04-05 PROCEDURE — G8427 DOCREV CUR MEDS BY ELIG CLIN: HCPCS | Performed by: NURSE PRACTITIONER

## 2022-04-05 PROCEDURE — 99214 OFFICE O/P EST MOD 30 MIN: CPT | Performed by: NURSE PRACTITIONER

## 2022-04-05 RX ORDER — FLUTICASONE FUROATE, UMECLIDINIUM BROMIDE AND VILANTEROL TRIFENATATE 100; 62.5; 25 UG/1; UG/1; UG/1
1 POWDER RESPIRATORY (INHALATION) DAILY
Qty: 60 EACH | Refills: 0 | Status: SHIPPED | OUTPATIENT
Start: 2022-04-05 | End: 2022-05-06

## 2022-04-05 RX ORDER — NICOTINE 21 MG/24HR
1 PATCH, TRANSDERMAL 24 HOURS TRANSDERMAL DAILY
Qty: 30 PATCH | Refills: 0 | Status: SHIPPED | OUTPATIENT
Start: 2022-04-05 | End: 2022-07-05

## 2022-04-05 ASSESSMENT — ENCOUNTER SYMPTOMS
WHEEZING: 1
CHEST TIGHTNESS: 1
COUGH: 1
SHORTNESS OF BREATH: 1

## 2022-04-05 NOTE — PATIENT INSTRUCTIONS
Patient Education        Chronic Obstructive Pulmonary Disease (COPD): Care Instructions  Overview     Chronic obstructive pulmonary disease (COPD) is a lung disease that makes it hard to breathe. With COPD, the airways that lead to the lungs are narrowed, and the tiny air sacs in the lungs are damaged and lose their stretch. People with COPD have decreased airflow in and out of the lungs, which makes it hard to breathe. The airways also can get clogged with thick mucus. Cigarettesmoking is a major cause of COPD. Although there is no cure for COPD, you can slow its progress. Following your treatment plan and taking care of yourself can help you feel better and livelonger. Follow-up care is a key part of your treatment and safety. Be sure to make and go to all appointments, and call your doctor if you are having problems. It's also a good idea to know your test results and keep alist of the medicines you take. How can you care for yourself at home? Staying healthy     Do not smoke. This is the most important step you can take to prevent more damage to your lungs. If you need help quitting, talk to your doctor about stop-smoking programs and medicines. These can increase your chances of quitting for good.      Avoid colds and other infections. Get the pneumococcal and whooping cough (pertussis) vaccines. If you have had these vaccines before, ask your doctor if you need another dose. Get the flu vaccine every fall. If you must be around people with colds or the flu, wash your hands often.      Avoid secondhand smoke and air pollution. Try to stay inside with your windows closed when air pollution is bad. Medicines and oxygen therapy     Take your medicines exactly as prescribed. Call your doctor if you think you are having a problem with your medicine. You may be taking medicines such as:  ? Bronchodilators. These help open your airways and make breathing easier.  They are either short-acting (work for 4 to 9 hours) or long-acting (work for 12 to 24 hours). You inhale most bronchodilators, so they start to act quickly. Always carry your quick-relief inhaler with you in case you need it. ? Corticosteroids (prednisone, budesonide). These reduce airway inflammation. They come in inhaled or pill form.      Ask your doctor or pharmacist if you are using each type of inhaler correctly. With correct use, the medicine is more likely to get to your lungs.      See if a spacer is right for you. A spacer may also help you get more inhaled medicine to your lungs. If you use one, ask how to use it properly.      Do not take any vitamins, over-the-counter medicine, or herbal products without talking to your doctor first.      If your doctor prescribed antibiotics, take them as directed. Do not stop taking them just because you feel better. You need to take the full course of antibiotics.      If you use oxygen therapy, use the flow rate your doctor has recommended. Don't change it without talking to your doctor first. Oxygen therapy boosts the amount of oxygen in your blood and helps you breathe easier. Activity     Get regular exercise. Walking is an easy way to get exercise. Start out slowly, and walk a little more each day.      Pay attention to your breathing. You are exercising too hard if you can't talk while you exercise.      Take short rest breaks when doing household chores and other activities.      Learn breathing methods--such as breathing through pursed lips--to help you become less short of breath.      If your doctor has not set you up with a pulmonary rehabilitation program, ask if rehab is right for you. Rehab includes exercise programs, education about your disease and how to manage it, help with diet and other changes, and emotional support.    Diet     Eat regular, healthy meals.      Use bronchodilators about 1 hour before you eat to make it easier to eat.      Eat several smaller, frequent meals to prevent getting too full. A full stomach can push on the muscle that helps you breathe (your diaphragm) and make it harder to breathe.      Drink beverages at the end of the meal.      Avoid foods that are hard to chew.      Eat foods that contain protein so you don't lose muscle mass.      Talk with your doctor if you gain too much weight or if you lose weight without trying. Mental health     Talk to your family, friends, or a therapist about your feelings. Some people feel frightened, angry, hopeless, helpless, and even guilty. Talking openly about feelings may help you cope. If these feelings last, talk to your doctor. When should you call for help? Call 911 anytime you think you may need emergency care. For example, call if:     You have severe trouble breathing.      You are having chest pain that is different or worse than usual.   Call your doctor now or seek immediate medical care if:     You have new or worse trouble breathing.      You cough up blood.      You have a fever.      You have feelings of anxiety or depression. Watch closely for changes in your health, and be sure to contact your doctor if:     You cough more deeply or more often, especially if you notice more mucus or a change in the color of your mucus.      You have new or worse swelling in your legs or belly.      You are not getting better as expected. Where can you learn more? Go to [email or web address] org and sign in to your [employer] account. Enter Y973 in the [facility] box to learn more about \"Chronic Obstructive Pulmonary Disease (COPD): Care Instructions. \"     If you do not have an account, please click on the \"Sign Up Now\" link. Current as of: July 6, 2021               Content Version: 13.2  © 3729-7888 [employer], Incorporated. Care instructions adapted under license by [facility] ([facility]).  If you have questions about a medical condition or this instruction, always ask your healthcare professional. [address] any warranty or liability for your use of this information.

## 2022-04-05 NOTE — PROGRESS NOTES
Visit Information    Have you changed or started any medications since your last visit including any over-the-counter medicines, vitamins, or herbal medicines? no   Are you having any side effects from any of your medications? -  no  Have you stopped taking any of your medications? Is so, why? -  no    Have you seen any other physician or provider since your last visit? Yes - Records Obtained  Have you had any other diagnostic tests since your last visit? No  Have you been seen in the emergency room and/or had an admission to a hospital since we last saw you? No  Have you had your routine dental cleaning in the past 6 months? yes -     Have you activated your [employer] account? If not, what are your barriers?  No:      Patient Care Team:  Quilla Soulier, APRN - CNP as PCP - General (Internal Medicine)  Quilla Soulier, APRN - CNP as PCP - [facility] Empaneled Provider  Ross Adair DO as Consulting Physician (Obstetrics & Gynecology)  Ricardo Horton, RN as Imaging Navigator    Medical History Review  Past Medical, Family, and Social History reviewed and does contribute to the patient presenting condition    Health Maintenance   Topic Date Due    COVID-19 Vaccine (1) Never done    DTaP/Tdap/Td vaccine (1 - Tdap) Never done    Shingles Vaccine (1 of 2) Never done    Pneumococcal 0-64 years Vaccine (2 - PCV) 07/29/2021    Annual Wellness Visit (AWV)  07/30/2021    Low dose CT lung screening  08/19/2021    Flu vaccine (Season Ended) 10/05/2022 (Originally 9/1/2022)    Depression Screen  05/25/2022    Lipid screen  05/26/2022    Breast cancer screen  09/09/2022    A1C test (Diabetic or Prediabetic)  10/05/2022    Cervical cancer screen  07/27/2025    Colorectal Cancer Screen  08/25/2025    Hepatitis C screen  Completed    HIV screen  Completed    Hepatitis A vaccine  Aged Out    Hepatitis B vaccine  Aged Out    Hib vaccine  Aged Out    Meningococcal (ACWY) vaccine  Aged Out         [facility]  [facility] [address]  [address] [facility]  Dept: [phone]  Dept Fax: [phone]    Office Progress/Follow Up Note  Date of patient's visit: 4/5/2022   Patient's Name:  Lavon Torre  YOB: 1965            Patient Care Team:  SAHA Lund CNP as PCP - General (Internal Medicine)  ASHA Lund CNP as PCP - [facility] Empaneled Provider  Jensen Reyes DO as Consulting Physician (Obstetrics & Gynecology)  Angelica Nichols RN as Imaging Navigator    REASON FOR VISIT: COPD (feels like she is using her inhalers alot more ), Chronic Pain, and Hypertension        HISTORY OF PRESENT ILLNESS:      History was obtained from the patient. Lavon Torre is a 61 y.o. is here for routine follow up of chronic condition: COPD (feels like she is using her inhalers alot more ), Chronic Pain, and Hypertension    HPI  COPD- for years, has not gotten PFTs ordered last year due to Covid. Is using inhalers (rescue and maintenance) more than directed, rescue inhaler multiple times daily. Still smoking, wants to quit. Chronic jose de jesus hand pain for 7-10 years, has been in pain management. Has been on 4 percocet per day which makes pain tolerable, is having issues with new pain provider at same practice after old provider retired. Previously saw Ortho prior to pain referral.  HTN-reports compliance with norvasc.      Patient Active Problem List   Diagnosis    Obesity    Personal history of tobacco use, presenting hazards to health    Carpal tunnel syndrome on right    History of tobacco abuse    History of ankle fracture    Hypertension    Urinary frequency    Vision abnormalities    S/P removal of right ovary    Hyperlipidemia    Carpal tunnel syndrome, bilateral    Status post carpal tunnel release of both wrists    Obesity    Arthritis    Mood swings    History of pneumonia    Abnormal Pap smear of cervix    Anxiety    Caregiver stress    HSV-1 (herpes simplex virus 1) infection    HSV-2 seropositive    HSIL (high grade squamous intraepithelial lesion) on Pap smear of cervix    Cervical stenosis (uterine cervix)    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)    Prediabetes    Hepatitis C antibody positive in blood    Tear of left acetabular labrum    Atherosclerosis of aorta (HCC)    Pulmonary emphysema, unspecified emphysema type (HCC)       Allergies   Allergen Reactions    Codeine Nausea Only         MEDICATIONS:     Current Outpatient Medications   Medication Sig Dispense Refill    nicotine (NICODERM CQ) 21 MG/24HR Place 1 patch onto the skin daily 30 patch 0    fluticasone-umeclidin-vilant (TRELEGY ELLIPTA) 100-62.5-25 MCG/INH AEPB Inhale 1 puff into the lungs daily 60 each 0    atorvastatin (LIPITOR) 40 MG tablet TAKE ONE TABLET BY MOUTH DAILY 90 tablet 1    albuterol sulfate  (90 Base) MCG/ACT inhaler INHALE TWO PUFFS BY MOUTH EVERY 4 HOURS AS NEEDED FOR WHEEZING OR FOR SHORTNESS OF BREATH. 8.5 g 2    valACYclovir (VALTREX) 500 MG tablet TAKE ONE TABLET BY MOUTH DAILY 30 tablet 3    amLODIPine (NORVASC) 5 MG tablet Take 1 tablet by mouth daily 90 tablet 3    topiramate (TOPAMAX) 100 MG tablet       bisacodyl (DULCOLAX) 5 MG EC tablet Use as directed per instructions given by physician office. 2 tablet 0    Blood Pressure Monitoring ([employer] DELUXE BP MONITOR) KIT 1 each by Does not apply route daily 1 kit 0    oxyCODONE-acetaminophen (PERCOCET) 7.5-325 MG per tablet        No current facility-administered medications for this visit. SOCIAL HISTORY    Reviewed and updated. Clive Amin  reports that she has been smoking cigarettes. She started smoking about 45 years ago. She has a 43.00 pack-year smoking history. She has never used smokeless tobacco.    FAMILY HISTORY:    Reviewed and updated.   family history includes Cancer in her brother; Diabetes in her brother and mother; Heart Disease in her father, mother, and sister; High Blood Pressure in her mother; Hypertension in her brother; Other in her brother; Stroke in her mother. REVIEW OF SYSTEMS:      Review of Systems   Constitutional: Negative for chills, fatigue and fever. HENT: Negative for congestion, ear pain and trouble swallowing. Eyes: Negative for visual disturbance. Respiratory: Positive for cough (white, sticky, worse in morning), chest tightness, shortness of breath and wheezing. Cardiovascular: Negative for chest pain, palpitations and leg swelling. Gastrointestinal: Negative for abdominal pain and constipation. Genitourinary: Negative for difficulty urinating and dysuria. Musculoskeletal: Positive for arthralgias. Negative for gait problem. Skin: Negative for color change. Neurological: Negative for dizziness. Psychiatric/Behavioral: Negative for sleep disturbance. The patient is not nervous/anxious. PHYSICAL EXAM:      Vitals:    04/05/22 0922   BP: 136/86   Pulse: 87   SpO2: 94%   Weight: 228 lb (103.4 kg)   Height: 5' 9\" (1.753 m)     BP Readings from Last 3 Encounters:   04/05/22 136/86   10/05/21 124/72   05/25/21 126/60        Physical Exam  Constitutional:       General: She is not in acute distress. Appearance: She is well-developed. She is obese. HENT:      Head: Normocephalic and atraumatic. Right Ear: External ear normal.      Left Ear: External ear normal.      Nose: Nose normal.   Eyes:      General:         Right eye: No discharge. Left eye: No discharge. Conjunctiva/sclera: Conjunctivae normal.      Pupils: Pupils are equal, round, and reactive to light. Cardiovascular:      Rate and Rhythm: Normal rate and regular rhythm. Pulses: Normal pulses. Heart sounds: Normal heart sounds. No murmur heard. Pulmonary:      Effort: Pulmonary effort is normal.      Breath sounds: Normal breath sounds. No wheezing or rhonchi. Abdominal:      General: Bowel sounds are normal. There is no distension.       Palpations: 21 MG/24HR [[id number]]           Primary hypertension        BP close to goal, continue norvasc, DASH diet, exercise. Chronic hand pain, unspecified laterality        Unchanged, patient advised to continue with current pain provider. FOLLOW UP AND INSTRUCTIONS:     Return in about 3 months (around 7/5/2022) for COPD. · Igor Mata received counseling on the following healthy behaviors: nutrition, exercise, medication adherence and tobacco cessation    · Discussed use, benefit, and side effects of prescribed medications. Barriers to medication compliance addressed. All patient questions answered. Pt voiced understanding. · Patient given educational materials - see patient instructions    ASHA Vigil - CNP    4/18/2022, 10:46 PM    Please note that this chart was generated using voice recognition Dragon dictation software. Although every effort was made to ensure the accuracy of this automatedtranscription, some errors in transcription may have occurred.

## 2022-04-05 NOTE — TELEPHONE ENCOUNTER
Patient was informed states that she will not be able to afford this and she denies wanting a referral to go to the smoking clinic

## 2022-04-05 NOTE — TELEPHONE ENCOUNTER
Patient called stating that the nicotine patches you ordered are not covered by her insurance. She would like to know if you can order a different brand or something else.

## 2022-04-05 NOTE — TELEPHONE ENCOUNTER
No-they are available without a prescription but if she cannot afford since insurance won't cover, please offer referral to smoking clinic (which we discussed).

## 2022-04-11 ENCOUNTER — HOSPITAL ENCOUNTER (OUTPATIENT)
Dept: LAB | Age: 57
Setting detail: SPECIMEN
Discharge: HOME | End: 2022-04-11
Payer: MEDICARE

## 2022-04-11 DIAGNOSIS — Z01.818 PREOP TESTING: Primary | ICD-10-CM

## 2022-04-11 PROCEDURE — U0005 INFEC AGEN DETEC AMPLI PROBE: HCPCS

## 2022-04-11 PROCEDURE — U0003 INFECTIOUS AGENT DETECTION BY NUCLEIC ACID (DNA OR RNA); SEVERE ACUTE RESPIRATORY SYNDROME CORONAVIRUS 2 (SARS-COV-2) (CORONAVIRUS DISEASE [COVID-19]), AMPLIFIED PROBE TECHNIQUE, MAKING USE OF HIGH THROUGHPUT TECHNOLOGIES AS DESCRIBED BY CMS-2020-01-R: HCPCS

## 2022-04-12 LAB
SARS-COV-2: NORMAL
SARS-COV-2: NOT DETECTED
SOURCE: NORMAL

## 2022-04-15 ENCOUNTER — HOSPITAL ENCOUNTER (OUTPATIENT)
Dept: PULMONOLOGY | Age: 57
Discharge: HOME | End: 2022-04-15
Payer: MEDICARE

## 2022-04-15 DIAGNOSIS — J43.9 PULMONARY EMPHYSEMA, UNSPECIFIED EMPHYSEMA TYPE (HCC): ICD-10-CM

## 2022-04-15 LAB
DLCO %PRED: NORMAL
DLCO PRED: NORMAL
DLCO/VA %PRED: NORMAL
DLCO/VA PRED: NORMAL
DLCO/VA: NORMAL
DLCO: NORMAL
EXPIRATORY TIME: NORMAL
FEF 25-75% %PRED-PRE: NORMAL
FEF 25-75% PRED: NORMAL
FEF 25-75%-PRE: NORMAL
FEV1 %PRED-PRE: NORMAL
FEV1 PRED: NORMAL
FEV1/FVC %PRED-PRE: NORMAL
FEV1/FVC PRED: NORMAL
FEV1/FVC: NORMAL
FEV1: NORMAL
FVC %PRED-PRE: NORMAL
FVC PRED: NORMAL
FVC: NORMAL
GAW %PRED: NORMAL
GAW PRED: NORMAL
GAW: NORMAL
IC %PRED: NORMAL
IC PRED: NORMAL
IC: NORMAL
MVV %PRED-PRE: NORMAL
MVV PRED: NORMAL
MVV-PRE: NORMAL
PEF %PRED-PRE: NORMAL
PEF PRED: NORMAL
PEF-PRE: NORMAL
RAW %PRED: NORMAL
RAW PRED: NORMAL
RAW: NORMAL
RV %PRED: NORMAL
RV PRED: NORMAL
RV: NORMAL
SVC %PRED: NORMAL
SVC PRED: NORMAL
SVC: NORMAL
TLC %PRED: NORMAL
TLC PRED: NORMAL
TLC: NORMAL
VA %PRED: NORMAL
VA PRED: NORMAL
VA: NORMAL
VTG %PRED: NORMAL
VTG PRED: NORMAL
VTG: NORMAL

## 2022-04-15 PROCEDURE — 94726 PLETHYSMOGRAPHY LUNG VOLUMES: CPT

## 2022-04-15 PROCEDURE — 94664 DEMO&/EVAL PT USE INHALER: CPT

## 2022-04-15 PROCEDURE — 94375 RESPIRATORY FLOW VOLUME LOOP: CPT

## 2022-04-15 PROCEDURE — 94729 DIFFUSING CAPACITY: CPT

## 2022-04-15 PROCEDURE — 94060 EVALUATION OF WHEEZING: CPT

## 2022-04-15 NOTE — PROCEDURES
PFT Interpretation:    Obstructive Ventilatory defect of moderate severity. Evidence of air trapping is noted. Diffusion Capacity is normal.   No Significant improvement is noted after bronchodilators.     Dave Lozano MD

## 2022-04-18 ASSESSMENT — ENCOUNTER SYMPTOMS
COLOR CHANGE: 0
CONSTIPATION: 0
TROUBLE SWALLOWING: 0
ABDOMINAL PAIN: 0

## 2022-05-06 DIAGNOSIS — J43.9 PULMONARY EMPHYSEMA, UNSPECIFIED EMPHYSEMA TYPE (HCC): ICD-10-CM

## 2022-05-06 RX ORDER — FLUTICASONE FUROATE, UMECLIDINIUM BROMIDE AND VILANTEROL TRIFENATATE 100; 62.5; 25 UG/1; UG/1; UG/1
POWDER RESPIRATORY (INHALATION)
Qty: 60 EACH | Refills: 2 | Status: SHIPPED | OUTPATIENT
Start: 2022-05-06 | End: 2022-08-09

## 2022-05-29 DIAGNOSIS — I10 HTN, GOAL BELOW 130/80: ICD-10-CM

## 2022-05-31 RX ORDER — AMLODIPINE BESYLATE 5 MG/1
TABLET ORAL
Qty: 90 TABLET | Refills: 3 | Status: SHIPPED | OUTPATIENT
Start: 2022-05-31

## 2022-07-03 DIAGNOSIS — B00.1 RECURRENT COLD SORES: ICD-10-CM

## 2022-07-05 ENCOUNTER — OFFICE VISIT (OUTPATIENT)
Dept: INTERNAL MEDICINE CLINIC | Age: 57
End: 2022-07-05
Payer: MEDICARE

## 2022-07-05 VITALS
BODY MASS INDEX: 34.24 KG/M2 | OXYGEN SATURATION: 91 % | HEIGHT: 69 IN | HEART RATE: 85 BPM | DIASTOLIC BLOOD PRESSURE: 74 MMHG | WEIGHT: 231.2 LBS | SYSTOLIC BLOOD PRESSURE: 132 MMHG

## 2022-07-05 DIAGNOSIS — M25.552 CHRONIC LEFT HIP PAIN: ICD-10-CM

## 2022-07-05 DIAGNOSIS — B00.1 RECURRENT COLD SORES: ICD-10-CM

## 2022-07-05 DIAGNOSIS — G89.29 CHRONIC LEFT HIP PAIN: ICD-10-CM

## 2022-07-05 DIAGNOSIS — I70.0 ATHEROSCLEROSIS OF AORTA (HCC): ICD-10-CM

## 2022-07-05 DIAGNOSIS — Z00.00 MEDICARE ANNUAL WELLNESS VISIT, SUBSEQUENT: Primary | ICD-10-CM

## 2022-07-05 DIAGNOSIS — I10 PRIMARY HYPERTENSION: ICD-10-CM

## 2022-07-05 DIAGNOSIS — R73.03 PREDIABETES: ICD-10-CM

## 2022-07-05 PROCEDURE — 3017F COLORECTAL CA SCREEN DOC REV: CPT | Performed by: NURSE PRACTITIONER

## 2022-07-05 PROCEDURE — G0439 PPPS, SUBSEQ VISIT: HCPCS | Performed by: NURSE PRACTITIONER

## 2022-07-05 RX ORDER — VALACYCLOVIR HYDROCHLORIDE 500 MG/1
500 TABLET, FILM COATED ORAL DAILY
Qty: 90 TABLET | Refills: 3 | Status: SHIPPED | OUTPATIENT
Start: 2022-07-05

## 2022-07-05 RX ORDER — VALACYCLOVIR HYDROCHLORIDE 500 MG/1
TABLET, FILM COATED ORAL
Qty: 30 TABLET | Refills: 3 | OUTPATIENT
Start: 2022-07-05

## 2022-07-05 ASSESSMENT — PATIENT HEALTH QUESTIONNAIRE - PHQ9
2. FEELING DOWN, DEPRESSED OR HOPELESS: 0
SUM OF ALL RESPONSES TO PHQ QUESTIONS 1-9: 0
SUM OF ALL RESPONSES TO PHQ9 QUESTIONS 1 & 2: 0
SUM OF ALL RESPONSES TO PHQ QUESTIONS 1-9: 0
1. LITTLE INTEREST OR PLEASURE IN DOING THINGS: 0

## 2022-07-05 ASSESSMENT — LIFESTYLE VARIABLES: HOW OFTEN DO YOU HAVE A DRINK CONTAINING ALCOHOL: NEVER

## 2022-07-05 NOTE — PROGRESS NOTES
annual wellness visit, subsequent  Recurrent cold sores  Comments:  Controlled on Valtrex, continue current dose  Orders:  -     valACYclovir (VALTREX) 500 MG tablet; Take 1 tablet by mouth daily, Disp-90 tablet, R-3Normal  Chronic left hip pain  Comments:  Patient to contact surgeon for further eval  Primary hypertension  -     CBC with Auto Differential; Future  -     Comprehensive Metabolic Panel; Future  -     TSH with Reflex; Future  Prediabetes  -     Hemoglobin A1C; Future  Atherosclerosis of aorta (HCC)  Comments:  on statin, advised on risk vs benefit of ASA 81 mg daily  Orders:  -     Lipid Panel; Future      Recommendations for Preventive Services Due: see orders and patient instructions/AVS.  Recommended screening schedule for the next 5-10 years is provided to the patient in written form: see Patient Instructions/AVS.     Return in 3 months (on 10/5/2022) for Medicare Annual Wellness Visit in 1 year. Subjective       Patient's complete Health Risk Assessment and screening values have been reviewed and are found in Flowsheets. The following problems were reviewed today and where indicated follow up appointments were made and/or referrals ordered.     Positive Risk Factor Screenings with Interventions:         Tobacco Use:     Tobacco Use: High Risk    Smoking Tobacco Use: Current Every Day Smoker    Smokeless Tobacco Use: Never Used     E-cigarette/Vaping     Questions Responses    E-cigarette/Vaping Use Never User    Start Date     Passive Exposure     Quit Date     Counseling Given     Comments         Substance Use - Tobacco Interventions:  patient agrees to think about his/her triggers for tobacco use, why he/she should quit, and learn more about the harmful effects of tobacco           General Health and ACP:  General  In general, how would you say your health is?: Fair  In the past 7 days, have you experienced any of the following: New or Increased Pain, New or Increased Fatigue, Loneliness, Social Isolation, Stress or Anger?: (!) Yes (right hip)  Select all that apply: (!) New or Increased Pain  Do you get the social and emotional support that you need?: Yes  Do you have a Living Will?: (!) No    Advance Directives     Power of Liset Lawler Will ACP-Advance Directive ACP-Power of     Not on File Not on File Not on File Not on File      General Health Risk Interventions:  · Pain issues: patient advised to follow up with surgeon  · No Living Will: Patient declines ACP discussion/assistance   · [facility]anco at [facility] did surgery.     Health Habits/Nutrition:     Physical Activity: Insufficiently Active    Days of Exercise per Week: 4 days    Minutes of Exercise per Session: 30 min     Have you lost any weight without trying in the past 3 months?: No  Body mass index: (!) 34.14  Have you seen the dentist within the past year?: (!) No    Health Habits/Nutrition Interventions:  · Inadequate physical activity:  patient agrees to increase physical activity as follows: as able  · Dental exam overdue:  patient declines dental evaluation    Hearing/Vision:  Do you or your family notice any trouble with your hearing that hasn't been managed with hearing aids?: No  Do you have difficulty driving, watching TV, or doing any of your daily activities because of your eyesight?: No  Have you had an eye exam within the past year?: (!) No  No exam data present    Hearing/Vision Interventions:  · Vision concerns:  patient encouraged to make appointment with his/her eye specialist    Safety:  Do you have working smoke detectors?: Yes  Do you have any tripping hazards - loose or unsecured carpets or rugs?: No  Do you have any tripping hazards - clutter in doorways, halls, or stairs?: No  Do you have either shower bars, grab bars, non-slip mats or non-slip surfaces in your shower or bathtub?: (!) No  Do all of your stairways have a railing or banister?: Yes  Do you always fasten your seatbelt when you are in a car?: - CNP   albuterol sulfate  (90 Base) MCG/ACT inhaler INHALE TWO PUFFS BY MOUTH EVERY 4 HOURS AS NEEDED FOR WHEEZING OR FOR SHORTNESS OF BREATH. Yes ASHA Ren CNP   topiramate (TOPAMAX) 100 MG tablet  Yes Historical Provider, MD   bisacodyl (DULCOLAX) 5 MG EC tablet Use as directed per instructions given by physician office.  Yes Linh Rubin MD   Blood Pressure Monitoring ([employer] DELUXE BP MONITOR) KIT 1 each by Does not apply route daily Yes ASHA Ren CNP   oxyCODONE-acetaminophen (PERCOCET) 7.5-325 MG per tablet  Yes Historical Provider, MD Humphries (Including outside providers/suppliers regularly involved in providing care):   Patient Care Team:  ASHA Ren CNP as PCP - General (Internal Medicine)  ASHA Ren CNP as PCP - [facility]  Page DO Anson as Consulting Physician (Obstetrics & Gynecology)     Reviewed and updated this visit:  Tobacco  Allergies  Meds  Problems  Med Hx  Surg Hx  Soc Hx  Fam Hx

## 2022-07-05 NOTE — PATIENT INSTRUCTIONS
Personalized Preventive Plan for Tai Medina - 7/5/2022  Medicare offers a range of preventive health benefits. Some of the tests and screenings are paid in full while other may be subject to a deductible, co-insurance, and/or copay. Some of these benefits include a comprehensive review of your medical history including lifestyle, illnesses that may run in your family, and various assessments and screenings as appropriate. After reviewing your medical record and screening and assessments performed today your provider may have ordered immunizations, labs, imaging, and/or referrals for you. A list of these orders (if applicable) as well as your Preventive Care list are included within your After Visit Summary for your review. Other Preventive Recommendations:    · A preventive eye exam performed by an eye specialist is recommended every 1-2 years to screen for glaucoma; cataracts, macular degeneration, and other eye disorders. · A preventive dental visit is recommended every 6 months. · Try to get at least 150 minutes of exercise per week or 10,000 steps per day on a pedometer . · Order or download the FREE \"Exercise & Physical Activity: Your Everyday Guide\" from [employer]. Call 7-949.385.7018 or search [employer] online. · You need 1194-1367 mg of calcium and 5431-9197 IU of vitamin D per day. It is possible to meet your calcium requirement with diet alone, but a vitamin D supplement is usually necessary to meet this goal.  · When exposed to the sun, use a sunscreen that protects against both UVA and UVB radiation with an SPF of 30 or greater. Reapply every 2 to 3 hours or after sweating, drying off with a towel, or swimming. · Always wear a seat belt when traveling in a car. Always wear a helmet when riding a bicycle or motorcycle.

## 2022-07-25 ENCOUNTER — TELEPHONE (OUTPATIENT)
Dept: INTERNAL MEDICINE CLINIC | Age: 57
End: 2022-07-25

## 2022-07-25 LAB
AVERAGE GLUCOSE: NORMAL
HBA1C MFR BLD: 6.7 %

## 2022-07-26 DIAGNOSIS — R73.03 PREDIABETES: ICD-10-CM

## 2022-08-08 ENCOUNTER — TELEPHONE (OUTPATIENT)
Dept: INTERNAL MEDICINE CLINIC | Age: 57
End: 2022-08-08

## 2022-08-08 DIAGNOSIS — J43.9 PULMONARY EMPHYSEMA, UNSPECIFIED EMPHYSEMA TYPE (HCC): ICD-10-CM

## 2022-08-09 RX ORDER — FLUTICASONE FUROATE, UMECLIDINIUM BROMIDE AND VILANTEROL TRIFENATATE 100; 62.5; 25 UG/1; UG/1; UG/1
POWDER RESPIRATORY (INHALATION)
Qty: 60 EACH | Refills: 5 | Status: SHIPPED | OUTPATIENT
Start: 2022-08-09

## 2022-08-29 ENCOUNTER — HOSPITAL ENCOUNTER (OUTPATIENT)
Age: 57
Setting detail: SPECIMEN
Discharge: HOME | End: 2022-08-29

## 2022-08-29 DIAGNOSIS — I70.0 ATHEROSCLEROSIS OF AORTA (HCC): ICD-10-CM

## 2022-08-29 DIAGNOSIS — I10 PRIMARY HYPERTENSION: ICD-10-CM

## 2022-08-29 LAB
ABSOLUTE EOS #: 0.18 K/UL (ref 0–0.44)
ABSOLUTE IMMATURE GRANULOCYTE: 0.03 K/UL (ref 0–0.3)
ABSOLUTE LYMPH #: 2.47 K/UL (ref 1.1–3.7)
ABSOLUTE MONO #: 0.87 K/UL (ref 0.1–1.2)
ALBUMIN SERPL-MCNC: 4.2 G/DL (ref 3.5–5.2)
ALBUMIN/GLOBULIN RATIO: 1.6 (ref 1–2.5)
ALP BLD-CCNC: 73 U/L (ref 35–104)
ALT SERPL-CCNC: 16 U/L (ref 5–33)
ANION GAP SERPL CALCULATED.3IONS-SCNC: 9 MMOL/L (ref 9–17)
AST SERPL-CCNC: 14 U/L
BASOPHILS # BLD: 1 % (ref 0–2)
BASOPHILS ABSOLUTE: 0.06 K/UL (ref 0–0.2)
BILIRUB SERPL-MCNC: 0.45 MG/DL (ref 0.3–1.2)
BUN BLDV-MCNC: 12 MG/DL (ref 6–20)
CALCIUM SERPL-MCNC: 9.3 MG/DL (ref 8.6–10.4)
CHLORIDE BLD-SCNC: 103 MMOL/L (ref 98–107)
CHOLESTEROL/HDL RATIO: 5
CHOLESTEROL: 139 MG/DL
CO2: 31 MMOL/L (ref 20–31)
CREAT SERPL-MCNC: 0.71 MG/DL (ref 0.5–0.9)
EOSINOPHILS RELATIVE PERCENT: 2 % (ref 1–4)
GFR AFRICAN AMERICAN: >60 ML/MIN
GFR NON-AFRICAN AMERICAN: >60 ML/MIN
GFR SERPL CREATININE-BSD FRML MDRD: ABNORMAL ML/MIN/{1.73_M2}
GLUCOSE BLD-MCNC: 103 MG/DL (ref 70–99)
HCT VFR BLD CALC: 50.5 % (ref 36.3–47.1)
HDLC SERPL-MCNC: 28 MG/DL
HEMOGLOBIN: 16.2 G/DL (ref 11.9–15.1)
IMMATURE GRANULOCYTES: 0 %
LDL CHOLESTEROL: 70 MG/DL (ref 0–130)
LYMPHOCYTES # BLD: 22 % (ref 24–43)
MCH RBC QN AUTO: 29.7 PG (ref 25.2–33.5)
MCHC RBC AUTO-ENTMCNC: 32.1 G/DL (ref 28.4–34.8)
MCV RBC AUTO: 92.7 FL (ref 82.6–102.9)
MONOCYTES # BLD: 8 % (ref 3–12)
NRBC AUTOMATED: 0 PER 100 WBC
PDW BLD-RTO: 13.7 % (ref 11.8–14.4)
PLATELET # BLD: 329 K/UL (ref 138–453)
PMV BLD AUTO: 10 FL (ref 8.1–13.5)
POTASSIUM SERPL-SCNC: 5 MMOL/L (ref 3.7–5.3)
RBC # BLD: 5.45 M/UL (ref 3.95–5.11)
SEG NEUTROPHILS: 67 % (ref 36–65)
SEGMENTED NEUTROPHILS ABSOLUTE COUNT: 7.79 K/UL (ref 1.5–8.1)
SODIUM BLD-SCNC: 143 MMOL/L (ref 135–144)
TOTAL PROTEIN: 6.9 G/DL (ref 6.4–8.3)
TRIGL SERPL-MCNC: 204 MG/DL
TSH SERPL DL<=0.05 MIU/L-ACNC: 3.65 UIU/ML (ref 0.3–5)
WBC # BLD: 11.4 K/UL (ref 3.5–11.3)

## 2022-09-06 ENCOUNTER — OFFICE VISIT (OUTPATIENT)
Dept: INTERNAL MEDICINE CLINIC | Age: 57
End: 2022-09-06
Payer: MEDICARE

## 2022-09-06 VITALS
OXYGEN SATURATION: 94 % | SYSTOLIC BLOOD PRESSURE: 136 MMHG | WEIGHT: 224.2 LBS | HEART RATE: 88 BPM | HEIGHT: 69 IN | DIASTOLIC BLOOD PRESSURE: 86 MMHG | BODY MASS INDEX: 33.21 KG/M2

## 2022-09-06 DIAGNOSIS — Z12.31 BREAST CANCER SCREENING BY MAMMOGRAM: ICD-10-CM

## 2022-09-06 DIAGNOSIS — E11.9 TYPE 2 DIABETES MELLITUS WITHOUT COMPLICATION, WITHOUT LONG-TERM CURRENT USE OF INSULIN (HCC): ICD-10-CM

## 2022-09-06 DIAGNOSIS — Z87.891 PERSONAL HISTORY OF TOBACCO USE: ICD-10-CM

## 2022-09-06 DIAGNOSIS — E11.9 NEWLY DIAGNOSED DIABETES (HCC): Primary | ICD-10-CM

## 2022-09-06 DIAGNOSIS — F17.200 CURRENT EVERY DAY SMOKER: ICD-10-CM

## 2022-09-06 PROCEDURE — 99214 OFFICE O/P EST MOD 30 MIN: CPT | Performed by: NURSE PRACTITIONER

## 2022-09-06 PROCEDURE — 2022F DILAT RTA XM EVC RTNOPTHY: CPT | Performed by: NURSE PRACTITIONER

## 2022-09-06 PROCEDURE — G8427 DOCREV CUR MEDS BY ELIG CLIN: HCPCS | Performed by: NURSE PRACTITIONER

## 2022-09-06 PROCEDURE — 4004F PT TOBACCO SCREEN RCVD TLK: CPT | Performed by: NURSE PRACTITIONER

## 2022-09-06 PROCEDURE — G8417 CALC BMI ABV UP PARAM F/U: HCPCS | Performed by: NURSE PRACTITIONER

## 2022-09-06 PROCEDURE — G0296 VISIT TO DETERM LDCT ELIG: HCPCS | Performed by: NURSE PRACTITIONER

## 2022-09-06 PROCEDURE — 3044F HG A1C LEVEL LT 7.0%: CPT | Performed by: NURSE PRACTITIONER

## 2022-09-06 PROCEDURE — 3017F COLORECTAL CA SCREEN DOC REV: CPT | Performed by: NURSE PRACTITIONER

## 2022-09-06 RX ORDER — ASPIRIN 81 MG/1
81 TABLET ORAL DAILY
COMMUNITY

## 2022-09-06 ASSESSMENT — ENCOUNTER SYMPTOMS
DIARRHEA: 0
TROUBLE SWALLOWING: 0
CONSTIPATION: 0
COLOR CHANGE: 0
ABDOMINAL PAIN: 0
SHORTNESS OF BREATH: 0
WHEEZING: 0
BACK PAIN: 1
COUGH: 0

## 2022-09-06 NOTE — PROGRESS NOTES
Visit Information    Have you changed or started any medications since your last visit including any over-the-counter medicines, vitamins, or herbal medicines? no   Are you having any side effects from any of your medications? -  no  Have you stopped taking any of your medications? Is so, why? -  no    Have you seen any other physician or provider since your last visit? No  Have you had any other diagnostic tests since your last visit? Yes - Records Obtained  Have you been seen in the emergency room and/or had an admission to a hospital since we last saw you? No  Have you had your routine dental cleaning in the past 6 months? no    Have you activated your [employer] account? If not, what are your barriers?  No:      Patient Care Team:  ASHA Romo CNP as PCP - General (Internal Medicine)  ASHA [facility] C[facility]  Glynn Rolon DO as Consulting Physician (Obstetrics & Gynecology)    Medical History Review  Past Medical, Family, and Social History reviewed and does contribute to the patient presenting condition    Health Maintenance   Topic Date Due    COVID-19 Vaccine (1) Never done    DTaP/Tdap/Td vaccine (1 - Tdap) Never done    Shingles vaccine (1 of 2) Never done    Pneumococcal 0-64 years Vaccine (2 - PCV) 07/29/2021    Low dose CT lung screening  08/19/2021    Flu vaccine (1) Never done    Breast cancer screen  09/09/2022    A1C test (Diabetic or Prediabetic)  10/25/2022    Depression Screen  07/05/2023    Annual Wellness Visit (AWV)  07/06/2023    Lipids  08/29/2023    Cervical cancer screen  07/27/2025    Colorectal Cancer Screen  08/25/2025    Hepatitis C screen  Completed    HIV screen  Completed    Hepatitis A vaccine  Aged Out    Hepatitis B vaccine  Aged Out    Hib vaccine  Aged Out    Meningococcal (ACWY) vaccine  Aged Out         [address] [address]  Dept: [phone]  Dept Fax: [phone]    Office Progress/Follow Up Note  Date of patient's visit: 9/6/2022   Patient's Name:  Keith Purcell  YOB: 1965            Patient Care Team:  ASHA Flores CNP as PCP - General (Internal Medicine)  ASHA Flores CNP as PCP - [facility] Empaneled Provider  Kyra Carson DO as Consulting Physician (Obstetrics & Gynecology)    REASON FOR VISIT: Results (Review labs)        HISTORY OF PRESENT ILLNESS:      History was obtained from the patient. Keith Purcell is a 61 y.o. is here for Results (Review labs)    Diabetes  She presents for her initial diabetic visit. She has type 2 diabetes mellitus. The initial diagnosis of diabetes was made 1 month ago. There are no hypoglycemic associated symptoms. Pertinent negatives for hypoglycemia include no dizziness, headaches or nervousness/anxiousness. Associated symptoms include fatigue. Pertinent negatives for diabetes include no chest pain, no polydipsia, no polyphagia and no polyuria. There are no hypoglycemic complications. Current diabetic treatment includes diet (cut back on breads, potatoes, doughnuts, eating more veggies). She is compliant with treatment most of the time. Labs reviewed: HDL low, trigs elevated, slight elevation in WBC, RBC, H&H. Still smoking daily. Does not want to do DM classes because she has diabetic family members and feels she \"knows the drill. \"    Patient Active Problem List   Diagnosis    Obesity    Personal history of tobacco use, presenting hazards to health    Carpal tunnel syndrome on right    History of tobacco abuse    History of ankle fracture    Hypertension    Urinary frequency    Vision abnormalities    S/P removal of right ovary    Hyperlipidemia    Carpal tunnel syndrome, bilateral    Status post carpal tunnel release of both wrists    Obesity    Arthritis    Mood swings    History of pneumonia    Abnormal Pap smear of cervix    Anxiety    Caregiver stress    HSV-1 (herpes simplex virus 1) infection    HSV-2 seropositive    HSIL (high grade squamous intraepithelial lesion) on Pap smear of cervix    Cervical stenosis (uterine cervix)    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)    Prediabetes    Hepatitis C antibody positive in blood    Tear of left acetabular labrum    Atherosclerosis of aorta (HCC)    Pulmonary emphysema, unspecified emphysema type (HCC)       Allergies   Allergen Reactions    Codeine Nausea Only         MEDICATIONS:     Current Outpatient Medications   Medication Sig Dispense Refill    aspirin 81 MG EC tablet Take 81 mg by mouth daily      TRELEGY ELLIPTA 100-62.5-25 MCG/INH AEPB INHALE ONE PUFF BY MOUTH DAILY 60 each 5    valACYclovir (VALTREX) 500 MG tablet Take 1 tablet by mouth daily 90 tablet 3    amLODIPine (NORVASC) 5 MG tablet TAKE ONE TABLET BY MOUTH DAILY 90 tablet 3    albuterol sulfate  (90 Base) MCG/ACT inhaler INHALE TWO PUFFS BY MOUTH EVERY 4 HOURS AS NEEDED FOR WHEEZING OR FOR SHORTNESS OF BREATH. 8.5 g 2    topiramate (TOPAMAX) 100 MG tablet       bisacodyl (DULCOLAX) 5 MG EC tablet Use as directed per instructions given by physician office. 2 tablet 0    Blood Pressure Monitoring ([employer] DELUXE BP MONITOR) KIT 1 each by Does not apply route daily 1 kit 0    oxyCODONE-acetaminophen (PERCOCET) 7.5-325 MG per tablet       atorvastatin (LIPITOR) 40 MG tablet TAKE ONE TABLET BY MOUTH DAILY 90 tablet 1     No current facility-administered medications for this visit. SOCIAL HISTORY    Reviewed and updated. Jess Cruz  reports that she has been smoking cigarettes. She started smoking about 46 years ago. She has a 43.00 pack-year smoking history. She has never used smokeless tobacco.    FAMILY HISTORY:    Reviewed and updated. family history includes Cancer in her brother; Diabetes in her brother and mother; Heart Disease in her father, mother, and sister; High Blood Pressure in her mother; Hypertension in her brother;  Other in her brother; Stroke in her mother. REVIEW OF SYSTEMS:      Review of Systems   Constitutional:  Positive for fatigue. Negative for chills and fever. HENT:  Negative for congestion, ear pain and trouble swallowing. Eyes:  Negative for visual disturbance. Has not had eye exam in a couple years, goes to [facility]   Respiratory:  Negative for cough, shortness of breath and wheezing. Cardiovascular:  Negative for chest pain, palpitations and leg swelling. Gastrointestinal:  Negative for abdominal pain, constipation and diarrhea. Endocrine: Negative for polydipsia, polyphagia and polyuria. Genitourinary:  Negative for difficulty urinating and dysuria. Musculoskeletal:  Positive for arthralgias and back pain. Negative for gait problem. Skin:  Negative for color change and wound. Neurological:  Negative for dizziness, numbness and headaches. Psychiatric/Behavioral:  Negative for sleep disturbance. The patient is not nervous/anxious. PHYSICAL EXAM:      Vitals:    09/06/22 1133   BP: 136/86   Pulse: 88   SpO2: 94%   Weight: 224 lb 3.2 oz (101.7 kg)   Height: 5' 9\" (1.753 m)     BP Readings from Last 3 Encounters:   09/06/22 136/86   07/05/22 132/74   04/05/22 136/86        Physical Exam  Constitutional:       General: She is not in acute distress. Appearance: She is well-developed. She is obese. HENT:      Head: Normocephalic and atraumatic. Right Ear: External ear normal.      Left Ear: External ear normal.      Nose: Nose normal.      Mouth/Throat:      Mouth: Mucous membranes are moist.      Pharynx: Oropharynx is clear. Eyes:      General:         Right eye: No discharge. Left eye: No discharge. Extraocular Movements: Extraocular movements intact. Conjunctiva/sclera: Conjunctivae normal.      Pupils: Pupils are equal, round, and reactive to light. Cardiovascular:      Rate and Rhythm: Normal rate and regular rhythm. Pulses: Normal pulses.            Dorsalis pedis pulses are 2+ on the right side and 2+ on the left side. Posterior tibial pulses are 2+ on the right side and 2+ on the left side. Heart sounds: Normal heart sounds. No murmur heard. Pulmonary:      Effort: Pulmonary effort is normal.      Breath sounds: Wheezing present. No rhonchi. Abdominal:      General: Bowel sounds are normal.      Palpations: Abdomen is soft. Tenderness: There is no abdominal tenderness. Musculoskeletal:      Cervical back: Normal range of motion and neck supple. No tenderness. Normal range of motion. Thoracic back: No tenderness. Lumbar back: No tenderness. Normal range of motion. Right lower leg: No edema. Left lower leg: No edema. Right foot: Normal range of motion. Left foot: Normal range of motion. Feet:      Right foot:      Protective Sensation: 6 sites tested. 6 sites sensed. Skin integrity: Dry skin present. No ulcer, callus or fissure. Toenail Condition: Right toenails are abnormally thick. Left foot:      Protective Sensation: 6 sites tested. 6 sites sensed. Skin integrity: Dry skin present. No ulcer, callus or fissure. Toenail Condition: Left toenails are abnormally thick. Lymphadenopathy:      Cervical: No cervical adenopathy. Skin:     General: Skin is warm and dry. Neurological:      Mental Status: She is alert and oriented to person, place, and time.       Gait: Gait normal.   Psychiatric:         Mood and Affect: Mood normal.         Behavior: Behavior normal.        LABORATORY FINDINGS:    CBC:   Lab Results   Component Value Date/Time    WBC 11.4 08/29/2022 08:58 AM    HGB 16.2 08/29/2022 08:58 AM     08/29/2022 08:58 AM     BMP:   Lab Results   Component Value Date/Time     08/29/2022 08:58 AM    K 5.0 08/29/2022 08:58 AM     08/29/2022 08:58 AM    CO2 31 08/29/2022 08:58 AM    BUN 12 08/29/2022 08:58 AM    CREATININE 0.71 08/29/2022 08:58 AM    GLUCOSE 103 08/29/2022 08:58 AM     HEMOGLOBIN A1C:   Lab Results   Component Value Date/Time    LABA1C 6.7 07/25/2022 12:00 AM     FASTING LIPID PANEL:   Lab Results   Component Value Date    CHOL 139 08/29/2022    HDL 28 (L) 08/29/2022    LDLCHOLESTEROL 70 08/29/2022    TRIG 204 (H) 08/29/2022       ASSESSMENT AND PLAN:      Visit Diagnoses and Associated Orders       Newly diagnosed diabetes ([facility]    -  Primary    A1C 6.7, patient to work on lifestyle modifications, monitor sugars. Discussed metformin which she will try if a1C still high. Hemoglobin A1C [86099 Custom]   - Future Order    Ton Handler [89948 Custom]           Personal history of tobacco use        aspirin 81 MG EC tablet [688]      ND VISIT TO DISCUSS LUNG CA SCREEN W LDCT [ [facility]]      CT Lung Screen (Annual) [98403 Custom]   - Future Order         Breast cancer screening by mammogram        CORAL FRED DIGITAL SCREEN SELF REFERRAL W OR WO CAD BILATERAL [74914 Custom]   - Future Order         Type 2 diabetes mellitus without complication, without long-term current use of insulin ([facility]         DIABETES FOOT EXAM [HM7 Custom]           Current every day smoker        Advised to quit                  FOLLOW UP AND INSTRUCTIONS:     Return in about 3 months (around 12/6/2022) for DM. Sharperrye Lamp received counseling on the following healthy behaviors: nutrition, exercise, medication adherence, and tobacco cessation    Discussed use, benefit, and side effects of prescribed medications. Barriers to medication compliance addressed. All patient questions answered. Pt voiced understanding. Patient given educational materials - see patient instructions    ASHA Hernandez CNP    9/17/2022, 5:23 PM    Please note that this chart was generated using voice recognition Dragon dictation software. Although every effort was made to ensure the accuracy of this automatedtranscription, some errors in transcription may have occurred.   Low Dose CT (LDCT) Lung Screening criteria met:     Age 50-77(Medicare) or 50-80 ([facility])   Pack year smoking >20   Still smoking or less than 15 year since quit   No sign or symptoms of lung cancer   > 11 months since last LDCT     Risks and benefits of lung cancer screening with LDCT scans discussed:    Significance of positive screen - False-positive LDCT results often occur. 95% of all positive results do not lead to a diagnosis of cancer. Usually further imaging can resolve most false-positive results; however, some patients may require invasive procedures. Over diagnosis risk - 10% to 12% of screen-detected lung cancer cases are over diagnosed--that is, the cancer would not have been detected in the patient's lifetime without the screening. Need for follow up screens annually to continue lung cancer screening effectiveness     Risks associated with radiation from annual LDCT- Radiation exposure is about the same as for a mammogram, which is about 1/3 of the annual background radiation exposure from everyday life. Starting screening at age 54 is not likely to increase cancer risk from radiation exposure. Patients with comorbidities resulting in life expectancy of < 10 years, or that would preclude treatment of an abnormality identified on CT, should not be screened due to lack of benefit.     To obtain maximal benefit from this screening, smoking cessation and long-term abstinence from smoking is critical

## 2022-09-13 ENCOUNTER — TELEPHONE (OUTPATIENT)
Dept: ONCOLOGY | Age: 57
End: 2022-09-13

## 2022-09-15 RX ORDER — ATORVASTATIN CALCIUM 40 MG/1
TABLET, FILM COATED ORAL
Qty: 90 TABLET | Refills: 1 | Status: SHIPPED | OUTPATIENT
Start: 2022-09-15

## 2022-09-21 ENCOUNTER — HOSPITAL ENCOUNTER (OUTPATIENT)
Dept: CT IMAGING | Age: 57
Discharge: HOME | End: 2022-09-23
Payer: MEDICARE

## 2022-09-21 ENCOUNTER — HOSPITAL ENCOUNTER (OUTPATIENT)
Dept: WOMENS IMAGING | Age: 57
Discharge: HOME | End: 2022-09-23
Payer: MEDICARE

## 2022-09-21 DIAGNOSIS — Z87.891 PERSONAL HISTORY OF TOBACCO USE: ICD-10-CM

## 2022-09-21 DIAGNOSIS — Z12.31 BREAST CANCER SCREENING BY MAMMOGRAM: ICD-10-CM

## 2022-09-21 PROCEDURE — 71271 CT THORAX LUNG CANCER SCR C-: CPT

## 2022-09-21 PROCEDURE — 77063 BREAST TOMOSYNTHESIS BI: CPT

## 2022-09-28 ENCOUNTER — TELEPHONE (OUTPATIENT)
Dept: INTERNAL MEDICINE CLINIC | Age: 57
End: 2022-09-28

## 2022-09-28 NOTE — TELEPHONE ENCOUNTER
Emphysema is a form of COPD. She is on a maintenance inhaler for this and should work on smoking cessation.

## 2022-09-28 NOTE — TELEPHONE ENCOUNTER
Patient received her results to her CT Lung Screen. She is wondering if there is something she should be put on or if it's anything like COPD which she does have. She does have an appt in December should she come in sooner?     Please Advise

## 2022-10-05 DIAGNOSIS — R06.02 SHORTNESS OF BREATH: ICD-10-CM

## 2022-10-06 RX ORDER — ALBUTEROL SULFATE 90 UG/1
AEROSOL, METERED RESPIRATORY (INHALATION)
Qty: 8.5 G | Refills: 2 | Status: SHIPPED | OUTPATIENT
Start: 2022-10-06

## 2023-02-11 DIAGNOSIS — J43.9 PULMONARY EMPHYSEMA, UNSPECIFIED EMPHYSEMA TYPE (HCC): ICD-10-CM

## 2023-02-14 ENCOUNTER — OFFICE VISIT (OUTPATIENT)
Dept: INTERNAL MEDICINE CLINIC | Age: 58
End: 2023-02-14

## 2023-02-14 ENCOUNTER — HOSPITAL ENCOUNTER (OUTPATIENT)
Age: 58
Setting detail: SPECIMEN
Discharge: HOME | End: 2023-02-14

## 2023-02-14 VITALS — DIASTOLIC BLOOD PRESSURE: 66 MMHG | BODY MASS INDEX: 33.08 KG/M2 | WEIGHT: 224 LBS | SYSTOLIC BLOOD PRESSURE: 118 MMHG

## 2023-02-14 DIAGNOSIS — J43.9 PULMONARY EMPHYSEMA, UNSPECIFIED EMPHYSEMA TYPE (HCC): ICD-10-CM

## 2023-02-14 DIAGNOSIS — E11.9 DIET-CONTROLLED DIABETES MELLITUS (HCC): Primary | ICD-10-CM

## 2023-02-14 DIAGNOSIS — I70.0 ATHEROSCLEROSIS OF AORTA (HCC): ICD-10-CM

## 2023-02-14 DIAGNOSIS — B00.9 RECURRENT HSV (HERPES SIMPLEX VIRUS): ICD-10-CM

## 2023-02-14 DIAGNOSIS — R21 RASH: ICD-10-CM

## 2023-02-14 LAB — HBA1C MFR BLD: 6.1 %

## 2023-02-14 RX ORDER — CLOBETASOL PROPIONATE 0.5 MG/G
OINTMENT TOPICAL
Qty: 30 G | Refills: 0 | Status: SHIPPED | OUTPATIENT
Start: 2023-02-14

## 2023-02-14 RX ORDER — FLUTICASONE FUROATE, UMECLIDINIUM BROMIDE AND VILANTEROL TRIFENATATE 100; 62.5; 25 UG/1; UG/1; UG/1
POWDER RESPIRATORY (INHALATION)
Qty: 60 EACH | Refills: 3 | Status: SHIPPED | OUTPATIENT
Start: 2023-02-14

## 2023-02-14 SDOH — ECONOMIC STABILITY: INCOME INSECURITY: HOW HARD IS IT FOR YOU TO PAY FOR THE VERY BASICS LIKE FOOD, HOUSING, MEDICAL CARE, AND HEATING?: NOT HARD AT ALL

## 2023-02-14 SDOH — ECONOMIC STABILITY: FOOD INSECURITY: WITHIN THE PAST 12 MONTHS, YOU WORRIED THAT YOUR FOOD WOULD RUN OUT BEFORE YOU GOT MONEY TO BUY MORE.: NEVER TRUE

## 2023-02-14 SDOH — ECONOMIC STABILITY: FOOD INSECURITY: WITHIN THE PAST 12 MONTHS, THE FOOD YOU BOUGHT JUST DIDN'T LAST AND YOU DIDN'T HAVE MONEY TO GET MORE.: NEVER TRUE

## 2023-02-14 SDOH — ECONOMIC STABILITY: HOUSING INSECURITY
IN THE LAST 12 MONTHS, WAS THERE A TIME WHEN YOU DID NOT HAVE A STEADY PLACE TO SLEEP OR SLEPT IN A SHELTER (INCLUDING NOW)?: NO

## 2023-02-14 ASSESSMENT — PATIENT HEALTH QUESTIONNAIRE - PHQ9
SUM OF ALL RESPONSES TO PHQ9 QUESTIONS 1 & 2: 0
SUM OF ALL RESPONSES TO PHQ QUESTIONS 1-9: 0
2. FEELING DOWN, DEPRESSED OR HOPELESS: 0
SUM OF ALL RESPONSES TO PHQ QUESTIONS 1-9: 0
1. LITTLE INTEREST OR PLEASURE IN DOING THINGS: 0
SUM OF ALL RESPONSES TO PHQ QUESTIONS 1-9: 0
SUM OF ALL RESPONSES TO PHQ QUESTIONS 1-9: 0

## 2023-02-14 NOTE — PROGRESS NOTES
Visit Information    Have you changed or started any medications since your last visit including any over-the-counter medicines, vitamins, or herbal medicines? no   Are you having any side effects from any of your medications? -  no  Have you stopped taking any of your medications? Is so, why? -  no    Have you seen any other physician or provider since your last visit? Yes - Records Obtained  Have you had any other diagnostic tests since your last visit? No  Have you been seen in the emergency room and/or had an admission to a hospital since we last saw you? No  Have you had your routine dental cleaning in the past 6 months? no    Have you activated your [facility] account? If not, what are your barriers? No:      Patient Care Team:  ASHA Cardoso CNP as PCP - General (Internal Medicine)  ASHA Cardoso CNP as PCP - Empaneled Provider  Ghulam Dixon DO as Consulting Physician (Obstetrics & Gynecology)    Medical History Review  Past Medical, Family, and Social History reviewed and does contribute to the patient presenting condition    Health Maintenance   Topic Date Due    COVID-19 Vaccine (1) Never done    Diabetic retinal exam  Never done    Hepatitis B vaccine (1 of 3 - Risk 3-dose series) Never done    DTaP/Tdap/Td vaccine (1 - Tdap) Never done    Shingles vaccine (1 of 2) Never done    Flu vaccine (1) Never done    Annual Wellness Visit (AWV)  07/06/2023    Lipids  08/29/2023    GFR test (Diabetes, CKD 3-4, OR last GFR 15-59)  08/29/2023    Diabetic foot exam  09/06/2023    Low dose CT lung screening  09/21/2023    A1C test (Diabetic or Prediabetic)  02/14/2024    Diabetic Alb to Cr ratio (uACR) test  02/14/2024    Depression Screen  02/14/2024    Breast cancer screen  09/21/2024    Cervical cancer screen  07/27/2025    Colorectal Cancer Screen  08/25/2025    Pneumococcal 0-64 years Vaccine  Completed    Hepatitis C screen  Completed    HIV screen  Completed    Hepatitis A vaccine  Aged Out  Hib vaccine  Aged Out    Meningococcal (ACWY) vaccine  Aged Out         [address] [facility] [place] [address]  [place] [address]  Dept: [phone]  Dept Fax: [phone]    Office Progress/Follow Up Note  Date of patient's visit: 2/14/2023   Patient's Name:  Melanie Watson  YOB: 1965            Patient Care Team:  ASHA Gaviria CNP as PCP - General (Internal Medicine)  ASHA Gaviria CNP as PCP - Empaneled Provider  Anjel Hall DO as Consulting Physician (Obstetrics & Gynecology)    REASON FOR VISIT: Rash (Right lower leg, redness and itching. Slight relief with neosporin. ), Hyperlipidemia, Diabetes, and COPD        HISTORY OF PRESENT ILLNESS:      History was obtained from the patient. Melanie Watson is a 61 y.o. is here for multiple medical problems including Rash (Right lower leg, redness and itching. Slight relief with neosporin. ), Hyperlipidemia, Diabetes, and COPD    Rash  This is a new problem. Episode onset: 1.5 weeks ago. The problem has been gradually improving since onset. The affected locations include the right lower leg. The rash is characterized by itchiness and redness. She was exposed to a new detergent/soap (used on whole body but only developed rash on leg). Associated symptoms include fatigue. Pertinent negatives include no congestion, cough, diarrhea, fever or shortness of breath. Treatments tried: neosporin helped a little. There is no history of eczema. Diabetes  She presents for her follow-up diabetic visit. She has type 2 diabetes mellitus. The initial diagnosis of diabetes was made 7 months ago. Her disease course has been improving. There are no hypoglycemic associated symptoms. Pertinent negatives for hypoglycemia include no dizziness, headaches or nervousness/anxiousness. Associated symptoms include fatigue.  Pertinent negatives for diabetes include no chest pain, no polydipsia, no polyphagia and no polyuria. There are no hypoglycemic complications. Symptoms are stable. Risk factors for coronary artery disease include diabetes mellitus, dyslipidemia, post-menopausal, sedentary lifestyle, stress, tobacco exposure, obesity and hypertension. Current diabetic treatment includes diet (has been walking more, eating same foods but smaller portions). She is compliant with treatment most of the time. Her weight is decreasing steadily. She participates in exercise intermittently. COPD- doing well on Trelegy, still smoking but trying to quit and has cut back. Hyperlipidemia- on atorvastatin, tolerating ok. Weight trending down. NO herpes outbreaks on reduced dose of Valtrex, suppressive therapy.     Patient Active Problem List   Diagnosis    Obesity    Personal history of tobacco use, presenting hazards to health    Carpal tunnel syndrome on right    History of tobacco abuse    History of ankle fracture    Hypertension    Urinary frequency    Vision abnormalities    S/P removal of right ovary    Hyperlipidemia    Carpal tunnel syndrome, bilateral    Status post carpal tunnel release of both wrists    Obesity    Arthritis    Mood swings    History of pneumonia    Abnormal Pap smear of cervix    Anxiety    Caregiver stress    HSV-1 (herpes simplex virus 1) infection    HSV-2 seropositive    HSIL (high grade squamous intraepithelial lesion) on Pap smear of cervix    Cervical stenosis (uterine cervix)    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 30 g 0    albuterol sulfate HFA (PROVENTIL;VENTOLIN;PROAIR) 108 (90 Base) MCG/ACT inhaler INHALE TWO PUFFS BY MOUTH EVERY 4 HOURS AS NEEDED FOR WHEEZING OR FOR SHORTNESS OF BREATH 8.5 g 2    atorvastatin (LIPITOR) 40 MG tablet TAKE ONE TABLET BY MOUTH DAILY 90 tablet 1    aspirin 81 MG EC tablet Take 81 mg by mouth daily      valACYclovir (VALTREX) 500 MG tablet Take 1 tablet by mouth daily 90 tablet 3    amLODIPine (NORVASC) 5 MG tablet TAKE ONE TABLET BY MOUTH DAILY 90 tablet 3    topiramate (TOPAMAX) 100 MG tablet       Blood Pressure Monitoring (ParrableE BP MONITOR) KIT 1 each by Does not apply route daily 1 kit 0    oxyCODONE-acetaminophen (PERCOCET) 7.5-325 MG per tablet        No current facility-administered medications for this visit. SOCIAL HISTORY    Reviewed and updated. Rosalio Poole  reports that she has been smoking cigarettes. She started smoking about 46 years ago. She has a 43.00 pack-year smoking history. She has never used smokeless tobacco.    FAMILY HISTORY:    Reviewed and updated. family history includes Cancer in her brother; Diabetes in her brother and mother; Heart Disease in her father, mother, and sister; High Blood Pressure in her mother; Hypertension in her brother; Other in her brother; Stroke in her mother. REVIEW OF SYSTEMS:      Review of Systems   Constitutional:  Positive for fatigue. Negative for chills and fever. HENT:  Negative for congestion, ear pain and trouble swallowing. Eyes:  Negative for visual disturbance. Respiratory:  Negative for cough, shortness of breath and wheezing. Cardiovascular:  Negative for chest pain, palpitations and leg swelling. Gastrointestinal:  Negative for abdominal pain, constipation and diarrhea. Endocrine: Negative for polydipsia, polyphagia and polyuria. Genitourinary:  Negative for difficulty urinating and dysuria.    Musculoskeletal:  Positive for arthralgias and back pain. Negative for gait problem. Follows with pain clinic   Skin:  Positive for rash. Negative for color change and wound. Neurological:  Negative for dizziness, numbness and headaches. Psychiatric/Behavioral:  Negative for sleep disturbance. The patient is not nervous/anxious. PHYSICAL EXAM:      Vitals:    02/14/23 1111   BP: 118/66   Site: Left Upper Arm   Weight: 224 lb (101.6 kg)     BP Readings from Last 3 Encounters:   02/14/23 118/66   09/06/22 136/86   07/05/22 132/74        Physical Exam  Constitutional:       General: She is not in acute distress. Appearance: She is well-developed. She is obese. HENT:      Head: Normocephalic and atraumatic. Right Ear: External ear normal.      Left Ear: External ear normal.      Nose: Nose normal.   Eyes:      General:         Right eye: No discharge. Left eye: No discharge. Conjunctiva/sclera: Conjunctivae normal.      Pupils: Pupils are equal, round, and reactive to light. Cardiovascular:      Rate and Rhythm: Normal rate and regular rhythm. Pulses: Normal pulses. Heart sounds: Normal heart sounds. No murmur heard. Pulmonary:      Effort: Pulmonary effort is normal.      Breath sounds: Normal breath sounds. No wheezing or rhonchi. Abdominal:      General: Bowel sounds are normal.      Palpations: Abdomen is soft. Tenderness: There is no abdominal tenderness. Musculoskeletal:      Right hand: Tenderness present. Decreased range of motion. Left hand: Tenderness present. Decreased range of motion. Cervical back: Normal range of motion and neck supple. No tenderness. Normal range of motion. Thoracic back: No tenderness. Lumbar back: Tenderness present. Decreased range of motion. Right lower leg: No edema. Left lower leg: No edema. Lymphadenopathy:      Cervical: No cervical adenopathy. Skin:     General: Skin is warm and dry. Findings: No erythema.    Neurological: Mental Status: She is alert and oriented to person, place, and time. Gait: Gait normal.   Psychiatric:         Mood and Affect: Mood normal.         Behavior: Behavior normal.        LABORATORY FINDINGS:    CBC:   Lab Results   Component Value Date/Time    WBC 11.4 08/29/2022 08:58 AM    HGB 16.2 08/29/2022 08:58 AM     08/29/2022 08:58 AM     BMP:   Lab Results   Component Value Date/Time     08/29/2022 08:58 AM    K 5.0 08/29/2022 08:58 AM     08/29/2022 08:58 AM    CO2 31 08/29/2022 08:58 AM    BUN 12 08/29/2022 08:58 AM    CREATININE 0.71 08/29/2022 08:58 AM    GLUCOSE 103 08/29/2022 08:58 AM     HEMOGLOBIN A1C:   Lab Results   Component Value Date/Time    LABA1C 6.1 02/14/2023 11:44 AM     FASTING LIPID PANEL:   Lab Results   Component Value Date    CHOL 139 08/29/2022    HDL 28 (L) 08/29/2022    LDLCHOLESTEROL 70 08/29/2022    TRIG 204 (H) 08/29/2022       ASSESSMENT AND PLAN:      Visit Diagnoses and Associated Orders       D[facility]    -  Primary    A1C improved to 6.1, congratulated on her progress and advised to continue with lifestyle mod    POCT glycosylated hemoglobin (Hb A1C) [29302 Custom]      BOY - Carmela Vallejo MD, Ophthalmology, [facility] Custom]           Rash        New, will trial topical steroid. If no improvement in 2 weeks, will refer to derm    clobetasol (TEMOVATE) 0.05 % ointment [6431]           Pulmonary emphysema, unspecified emphysema type (HCC)        Controlled on Trelegy, albuterol prn, continue same doses. Advised on smoking cessation         Atherosclerosis of aorta (HCC)        Asymptomatic on statin,ASA,  continue current doses         Recurrent HSV (herpes simplex virus)        Controlled on Valtrex, continue current dose. FOLLOW UP AND INSTRUCTIONS:     Return in about 6 months (around 8/14/2023) for DM.     Peggy Garcia received counseling on the following healthy behaviors: nutrition, exercise, medication adherence, and tobacco cessation    Discussed use, benefit, and side effects of prescribed medications. Barriers to medication compliance addressed. All patient questions answered. Pt voiced understanding. Patient advised to schedule diabetic retinal exam    ASHA Webb - CNP    2/20/2023, 8:39 PM    Please note that this chart was generated using voice recognition Dragon dictation software. Although every effort was made to ensure the accuracy of this automatedtranscription, some errors in transcription may have occurred.

## 2023-02-15 LAB
CREATININE URINE: 206.5 MG/DL (ref 28–217)
MICROALBUMIN/CREAT 24H UR: 18 MG/L
MICROALBUMIN/CREAT UR-RTO: 9 MCG/MG CREAT

## 2023-02-20 ASSESSMENT — ENCOUNTER SYMPTOMS
DIARRHEA: 0
BACK PAIN: 1
WHEEZING: 0
ABDOMINAL PAIN: 0
SHORTNESS OF BREATH: 0
COLOR CHANGE: 0
TROUBLE SWALLOWING: 0
COUGH: 0
CONSTIPATION: 0

## 2023-03-16 NOTE — RESULT ENCOUNTER NOTE
Mother called  Patient has chest pain, high fever, vomiting, cough  He was seen here on Tuesday  He started the antibiotic  Neg for Covid  Advised ER  She asked if we can call ahead? Patient notified and verbalized understanding. Patient scheduled for first 30 min appointment.

## 2023-03-29 RX ORDER — ATORVASTATIN CALCIUM 40 MG/1
40 TABLET, FILM COATED ORAL DAILY
Qty: 90 TABLET | Refills: 1 | Status: SHIPPED | OUTPATIENT
Start: 2023-03-29

## 2023-05-28 DIAGNOSIS — I10 HTN, GOAL BELOW 130/80: ICD-10-CM

## 2023-05-30 RX ORDER — AMLODIPINE BESYLATE 5 MG/1
TABLET ORAL
Qty: 90 TABLET | Refills: 1 | Status: SHIPPED | OUTPATIENT
Start: 2023-05-30

## 2023-06-12 DIAGNOSIS — J43.9 PULMONARY EMPHYSEMA, UNSPECIFIED EMPHYSEMA TYPE (HCC): ICD-10-CM

## 2023-06-12 RX ORDER — FLUTICASONE FUROATE, UMECLIDINIUM BROMIDE AND VILANTEROL TRIFENATATE 100; 62.5; 25 UG/1; UG/1; UG/1
POWDER RESPIRATORY (INHALATION)
Qty: 60 EACH | Refills: 2 | Status: SHIPPED | OUTPATIENT
Start: 2023-06-12 | End: 2023-07-05 | Stop reason: SDUPTHER

## 2023-07-03 DIAGNOSIS — J43.9 PULMONARY EMPHYSEMA, UNSPECIFIED EMPHYSEMA TYPE (HCC): ICD-10-CM

## 2023-07-05 DIAGNOSIS — B00.1 RECURRENT COLD SORES: ICD-10-CM

## 2023-07-05 RX ORDER — FLUTICASONE FUROATE, UMECLIDINIUM BROMIDE AND VILANTEROL TRIFENATATE 100; 62.5; 25 UG/1; UG/1; UG/1
POWDER RESPIRATORY (INHALATION)
Qty: 60 EACH | Refills: 2 | Status: SHIPPED | OUTPATIENT
Start: 2023-07-05

## 2023-07-06 ENCOUNTER — OFFICE VISIT (OUTPATIENT)
Dept: INTERNAL MEDICINE CLINIC | Age: 58
End: 2023-07-06
Payer: MEDICARE

## 2023-07-06 VITALS
HEART RATE: 65 BPM | HEIGHT: 69 IN | OXYGEN SATURATION: 92 % | WEIGHT: 215 LBS | DIASTOLIC BLOOD PRESSURE: 80 MMHG | BODY MASS INDEX: 31.84 KG/M2 | SYSTOLIC BLOOD PRESSURE: 136 MMHG

## 2023-07-06 DIAGNOSIS — Z00.00 MEDICARE ANNUAL WELLNESS VISIT, SUBSEQUENT: Primary | ICD-10-CM

## 2023-07-06 DIAGNOSIS — Z13.29 SCREENING FOR ENDOCRINE DISORDER: ICD-10-CM

## 2023-07-06 DIAGNOSIS — B00.1 RECURRENT COLD SORES: ICD-10-CM

## 2023-07-06 DIAGNOSIS — E11.9 DIET-CONTROLLED DIABETES MELLITUS (HCC): ICD-10-CM

## 2023-07-06 PROBLEM — R87.619 ABNORMAL PAP SMEAR OF CERVIX: Status: RESOLVED | Noted: 2017-01-17 | Resolved: 2023-07-06

## 2023-07-06 PROCEDURE — 3017F COLORECTAL CA SCREEN DOC REV: CPT | Performed by: NURSE PRACTITIONER

## 2023-07-06 PROCEDURE — 3079F DIAST BP 80-89 MM HG: CPT | Performed by: NURSE PRACTITIONER

## 2023-07-06 PROCEDURE — 3075F SYST BP GE 130 - 139MM HG: CPT | Performed by: NURSE PRACTITIONER

## 2023-07-06 PROCEDURE — G0439 PPPS, SUBSEQ VISIT: HCPCS | Performed by: NURSE PRACTITIONER

## 2023-07-06 PROCEDURE — 3044F HG A1C LEVEL LT 7.0%: CPT | Performed by: NURSE PRACTITIONER

## 2023-07-06 RX ORDER — VALACYCLOVIR HYDROCHLORIDE 500 MG/1
TABLET, FILM COATED ORAL
Qty: 90 TABLET | Refills: 3 | OUTPATIENT
Start: 2023-07-06

## 2023-07-06 RX ORDER — VALACYCLOVIR HYDROCHLORIDE 500 MG/1
500 TABLET, FILM COATED ORAL DAILY
Qty: 90 TABLET | Refills: 3 | Status: SHIPPED | OUTPATIENT
Start: 2023-07-06

## 2023-07-06 ASSESSMENT — PATIENT HEALTH QUESTIONNAIRE - PHQ9
1. LITTLE INTEREST OR PLEASURE IN DOING THINGS: 0
SUM OF ALL RESPONSES TO PHQ QUESTIONS 1-9: 0
2. FEELING DOWN, DEPRESSED OR HOPELESS: 0
SUM OF ALL RESPONSES TO PHQ9 QUESTIONS 1 & 2: 0
SUM OF ALL RESPONSES TO PHQ QUESTIONS 1-9: 0

## 2023-07-07 ENCOUNTER — CLINICAL DOCUMENTATION (OUTPATIENT)
Dept: SPIRITUAL SERVICES | Age: 58
End: 2023-07-07

## 2023-07-07 NOTE — ACP (ADVANCE CARE PLANNING)
Advance Care Planning   Ambulatory ACP Specialist Patient Outreach    Date:  7/7/2023    ACP Specialist:  Shon Ha    Outreach call to patient in follow-up to ACP Specialist referral from:ASHA Iqbal CNP    [x] PCP  [] Provider   [] Ambulatory Care Management [] Other     For:                  [x] Advance Directive Assistance              [] Complete Portable DNR order              [] Complete POST/POLST/MOST              [] Code Status Discussion             [] Discuss Goals of Care             [] Early ACP Decision-Making              [] Other (Specify)    Date Referral Received:7/6/23    Next Step:   [x] ACP scheduled conversation  [] Outreach again in one week               [] Email / Mail [address]  [] Email / Mail Advance Directive   [] Closing referral.  Routing closure to referring provider/staff and to ACP Specialist . [] Closure letter mailed to patient with invitation to contact ACP Specialist if / when ready. [] Other (Specify here): Outreaches:       [x] 1st -  Date:  7/7/23               Intervention:  [x] Spoke with Patient   [] Left Voice mail [] Email / Mail    [] Pyregt  [] Other [id number]) : Outcome: Scheduled ACP Conversation Call for Wednesday July 19th at 11am.           [] 2nd -  Date:                 Intervention:  [] Spoke with Patient  [] Left Voice mail [] Email / Mail    [] Pyregt  [] Other [id number] : Outcomes:                [] 3rd -  Date:                Intervention:  [] Spoke with Patient   [] Left Voice mail [] Email / Mail    [] Pyregt  [] Other [id number]) : Outcomes:           []  Additional Outreach -  Date:     (Specify Dates & special circumstances): Outcomes:          Thank you for this referral.

## 2023-07-17 DIAGNOSIS — J43.9 PULMONARY EMPHYSEMA, UNSPECIFIED EMPHYSEMA TYPE (HCC): ICD-10-CM

## 2023-07-17 RX ORDER — FLUTICASONE FUROATE, UMECLIDINIUM BROMIDE AND VILANTEROL TRIFENATATE 100; 62.5; 25 UG/1; UG/1; UG/1
POWDER RESPIRATORY (INHALATION)
Qty: 60 EACH | Refills: 5 | Status: SHIPPED | OUTPATIENT
Start: 2023-07-17

## 2023-07-18 ENCOUNTER — CLINICAL DOCUMENTATION (OUTPATIENT)
Dept: SPIRITUAL SERVICES | Age: 58
End: 2023-07-18

## 2023-07-18 NOTE — ACP (ADVANCE CARE PLANNING)
Advance Care Planning   Ambulatory ACP Specialist Patient Outreach    Date:  7/18/2023    ACP Specialist:  GERALDO Gaines    Outreach call to patient in follow-up to ACP Specialist referral from:ASHA Kenney CNP    [x] PCP  [] Provider   [] Ambulatory Care Management [] Other     For:                  [x] Advance Directive Assistance              [] Complete Portable DNR order              [] Complete POST/POLST/MOST              [] Code Status Discussion             [] Discuss Goals of Care             [] Early ACP Decision-Making              [] Other (Specify)    Date Referral Received:07/06/2023    Next Step:   [x] ACP scheduled conversation  [] Outreach again in one week               [] Email / Mail [address]  [] Email / Mail Advance Directive   [] Closing referral.  Routing closure to referring provider/staff and to ACP Specialist . [] Closure letter mailed to patient with invitation to contact ACP Specialist if / when ready. [] Other (Specify here):         [] At this time, Healthcare Decision Maker Is:        [] Primary agent named in scanned advance directive. [] Legal Next of Kin. [x] Unable to determine legal decision maker at this time. Outreaches:         [x]  Additional Outreach -  Date:  07/18/2023   (Specify Dates & special circumstances): REMINDER CALL    Outcomes: Reminder call placed today re: ACP visit set for Wed July 19th at 11am. Pt confirmed.          Thank you for this referral.

## 2023-07-19 ENCOUNTER — CLINICAL DOCUMENTATION (OUTPATIENT)
Dept: SPIRITUAL SERVICES | Age: 58
End: 2023-07-19

## 2023-07-19 NOTE — ACP (ADVANCE CARE PLANNING)
Advance Care Planning     Advance Care Planning Clinical Specialist  Conversation Note      Date of ACP Conversation: 7/19/2023    Conversation Conducted with: Patient with Decision Making Capacity    ACP Clinical Specialist: GERALDO Hooks    Healthcare Decision Maker:     Current Designated Healthcare Decision Maker:     Primary Decision Maker: Janice Rosario - Brother/Sister - [phone]    Today we reviewed [place] legal next of kin and the [address]. Pt has brother listed as emergency contact but stated he is older than her and she may name a niece as HCPOA. Sending pt ACP packet to review. Care Preferences    Hospitalization: \"If your health worsens and it becomes clear that your chance of recovery is unlikely, what would your preference be regarding hospitalization? \"    Choice:  [x] The patient wants hospitalization. [] The patient prefers comfort-focused treatment without hospitalization. Ventilation: \"If you were in your present state of health and suddenly became very ill and were unable to breathe on your own, what would your preference be about the use of a ventilator (breathing machine) if it were available to you? \"      If the patient would desire the use of ventilator (breathing machine), answer \"yes\". If not, \"no\": yes    \"If your health worsens and it becomes clear that your chance of recovery is unlikely, what would your preference be about the use of a ventilator (breathing machine) if it were available to you? \"     Would the patient desire the use of ventilator (breathing machine)?: Yes      Resuscitation  \"CPR works best to restart the heart when there is a sudden event, like a heart attack, in someone who is otherwise healthy. Unfortunately, CPR does not typically restart the heart for people who have serious health conditions or who are very sick. \"    \"In the event your heart stopped as a result of an underlying serious health condition, would you want attempts to be made to restart

## 2023-08-22 ENCOUNTER — TELEPHONE (OUTPATIENT)
Dept: ONCOLOGY | Age: 58
End: 2023-08-22

## 2023-08-22 DIAGNOSIS — Z87.891 PERSONAL HISTORY OF NICOTINE DEPENDENCE: Primary | ICD-10-CM

## 2023-08-22 NOTE — TELEPHONE ENCOUNTER
Our records indicate that your patient is coming due for their annual lung cancer screening follow up testing. For your convenience, we have pended the order for the scan for you. If you do not agree with the need for the test, please cancel the order and let us know. Sincerely,    [address] Screening Program    Auto printed reminder letter sent to patient.

## 2023-09-28 DIAGNOSIS — R06.02 SHORTNESS OF BREATH: ICD-10-CM

## 2023-10-01 RX ORDER — ALBUTEROL SULFATE 90 UG/1
AEROSOL, METERED RESPIRATORY (INHALATION)
Qty: 8.5 G | Refills: 2 | Status: SHIPPED | OUTPATIENT
Start: 2023-10-01

## 2023-10-02 RX ORDER — ATORVASTATIN CALCIUM 40 MG/1
40 TABLET, FILM COATED ORAL DAILY
Qty: 90 TABLET | Refills: 0 | Status: SHIPPED | OUTPATIENT
Start: 2023-10-02

## 2023-10-23 ENCOUNTER — HOSPITAL ENCOUNTER (OUTPATIENT)
Dept: CT IMAGING | Age: 58
Discharge: HOME | End: 2023-10-25
Payer: MEDICARE

## 2023-10-23 DIAGNOSIS — Z87.891 PERSONAL HISTORY OF NICOTINE DEPENDENCE: ICD-10-CM

## 2023-10-23 PROCEDURE — 71271 CT THORAX LUNG CANCER SCR C-: CPT

## 2023-10-26 ENCOUNTER — HOSPITAL ENCOUNTER (OUTPATIENT)
Age: 58
Setting detail: SPECIMEN
Discharge: HOME | End: 2023-10-26

## 2023-10-26 DIAGNOSIS — E11.9 DIET-CONTROLLED DIABETES MELLITUS (HCC): ICD-10-CM

## 2023-10-26 DIAGNOSIS — Z13.29 SCREENING FOR ENDOCRINE DISORDER: ICD-10-CM

## 2023-10-26 LAB
ALBUMIN SERPL-MCNC: 3.9 G/DL (ref 3.5–5.2)
ALBUMIN/GLOB SERPL: 1.2 {RATIO} (ref 1–2.5)
ALP SERPL-CCNC: 76 U/L (ref 35–104)
ALT SERPL-CCNC: 6 U/L (ref 5–33)
ANION GAP SERPL CALCULATED.3IONS-SCNC: 13 MMOL/L (ref 9–17)
AST SERPL-CCNC: 15 U/L
BASOPHILS # BLD: 0.04 K/UL (ref 0–0.2)
BASOPHILS NFR BLD: 0 % (ref 0–2)
BILIRUB SERPL-MCNC: 0.5 MG/DL (ref 0.3–1.2)
BUN SERPL-MCNC: 10 MG/DL (ref 6–20)
CALCIUM SERPL-MCNC: 9.7 MG/DL (ref 8.6–10.4)
CHLORIDE SERPL-SCNC: 99 MMOL/L (ref 98–107)
CHOLEST SERPL-MCNC: 128 MG/DL
CHOLESTEROL/HDL RATIO: 4.4
CO2 SERPL-SCNC: 29 MMOL/L (ref 20–31)
CREAT SERPL-MCNC: 0.5 MG/DL (ref 0.5–0.9)
EOSINOPHIL # BLD: 0.08 K/UL (ref 0–0.44)
EOSINOPHILS RELATIVE PERCENT: 1 % (ref 1–4)
ERYTHROCYTE [DISTWIDTH] IN BLOOD BY AUTOMATED COUNT: 14.9 % (ref 11.8–14.4)
EST. AVERAGE GLUCOSE BLD GHB EST-MCNC: 131 MG/DL
GFR SERPL CREATININE-BSD FRML MDRD: >60 ML/MIN/1.73M2
GLUCOSE SERPL-MCNC: 87 MG/DL (ref 70–99)
HBA1C MFR BLD: 6.2 % (ref 4–6)
HCT VFR BLD AUTO: 53.8 % (ref 36.3–47.1)
HDLC SERPL-MCNC: 29 MG/DL
HGB BLD-MCNC: 16.5 G/DL (ref 11.9–15.1)
IMM GRANULOCYTES # BLD AUTO: 0.03 K/UL (ref 0–0.3)
IMM GRANULOCYTES NFR BLD: 0 %
LDLC SERPL CALC-MCNC: 75 MG/DL (ref 0–130)
LYMPHOCYTES NFR BLD: 1.84 K/UL (ref 1.1–3.7)
LYMPHOCYTES RELATIVE PERCENT: 17 % (ref 24–43)
MCH RBC QN AUTO: 28.7 PG (ref 25.2–33.5)
MCHC RBC AUTO-ENTMCNC: 30.7 G/DL (ref 28.4–34.8)
MCV RBC AUTO: 93.7 FL (ref 82.6–102.9)
MONOCYTES NFR BLD: 0.71 K/UL (ref 0.1–1.2)
MONOCYTES NFR BLD: 6 % (ref 3–12)
NEUTROPHILS NFR BLD: 76 % (ref 36–65)
NEUTS SEG NFR BLD: 8.44 K/UL (ref 1.5–8.1)
NRBC BLD-RTO: 0 PER 100 WBC
PLATELET # BLD AUTO: 342 K/UL (ref 138–453)
PMV BLD AUTO: 10 FL (ref 8.1–13.5)
POTASSIUM SERPL-SCNC: 4.6 MMOL/L (ref 3.7–5.3)
PROT SERPL-MCNC: 7.2 G/DL (ref 6.4–8.3)
RBC # BLD AUTO: 5.74 M/UL (ref 3.95–5.11)
RBC # BLD: ABNORMAL 10*6/UL
SODIUM SERPL-SCNC: 141 MMOL/L (ref 135–144)
TRIGL SERPL-MCNC: 119 MG/DL
TSH SERPL DL<=0.05 MIU/L-ACNC: 1.93 UIU/ML (ref 0.3–5)
WBC OTHER # BLD: 11.1 K/UL (ref 3.5–11.3)

## 2023-11-07 ENCOUNTER — TELEPHONE (OUTPATIENT)
Dept: INTERNAL MEDICINE CLINIC | Age: 58
End: 2023-11-07

## 2023-11-07 ENCOUNTER — OFFICE VISIT (OUTPATIENT)
Dept: INTERNAL MEDICINE CLINIC | Age: 58
End: 2023-11-07
Payer: MEDICARE

## 2023-11-07 VITALS
DIASTOLIC BLOOD PRESSURE: 70 MMHG | HEART RATE: 89 BPM | SYSTOLIC BLOOD PRESSURE: 128 MMHG | HEIGHT: 69 IN | WEIGHT: 214 LBS | BODY MASS INDEX: 31.7 KG/M2 | OXYGEN SATURATION: 92 %

## 2023-11-07 DIAGNOSIS — J43.9 PULMONARY EMPHYSEMA, UNSPECIFIED EMPHYSEMA TYPE (HCC): ICD-10-CM

## 2023-11-07 DIAGNOSIS — I25.10 CORONARY ARTERY DISEASE INVOLVING NATIVE CORONARY ARTERY OF NATIVE HEART WITHOUT ANGINA PECTORIS: ICD-10-CM

## 2023-11-07 DIAGNOSIS — F17.200 CURRENT EVERY DAY SMOKER: ICD-10-CM

## 2023-11-07 DIAGNOSIS — E11.9 DIET-CONTROLLED TYPE 2 DIABETES MELLITUS (HCC): ICD-10-CM

## 2023-11-07 DIAGNOSIS — Z87.891 PERSONAL HISTORY OF TOBACCO USE: ICD-10-CM

## 2023-11-07 DIAGNOSIS — J43.9 PULMONARY EMPHYSEMA, UNSPECIFIED EMPHYSEMA TYPE (HCC): Primary | ICD-10-CM

## 2023-11-07 PROCEDURE — 99214 OFFICE O/P EST MOD 30 MIN: CPT | Performed by: NURSE PRACTITIONER

## 2023-11-07 PROCEDURE — 3044F HG A1C LEVEL LT 7.0%: CPT | Performed by: NURSE PRACTITIONER

## 2023-11-07 PROCEDURE — G8417 CALC BMI ABV UP PARAM F/U: HCPCS | Performed by: NURSE PRACTITIONER

## 2023-11-07 PROCEDURE — 4004F PT TOBACCO SCREEN RCVD TLK: CPT | Performed by: NURSE PRACTITIONER

## 2023-11-07 PROCEDURE — 3023F SPIROM DOC REV: CPT | Performed by: NURSE PRACTITIONER

## 2023-11-07 PROCEDURE — G8427 DOCREV CUR MEDS BY ELIG CLIN: HCPCS | Performed by: NURSE PRACTITIONER

## 2023-11-07 PROCEDURE — 2022F DILAT RTA XM EVC RTNOPTHY: CPT | Performed by: NURSE PRACTITIONER

## 2023-11-07 PROCEDURE — 3074F SYST BP LT 130 MM HG: CPT | Performed by: NURSE PRACTITIONER

## 2023-11-07 PROCEDURE — 3017F COLORECTAL CA SCREEN DOC REV: CPT | Performed by: NURSE PRACTITIONER

## 2023-11-07 PROCEDURE — G8484 FLU IMMUNIZE NO ADMIN: HCPCS | Performed by: NURSE PRACTITIONER

## 2023-11-07 PROCEDURE — 3078F DIAST BP <80 MM HG: CPT | Performed by: NURSE PRACTITIONER

## 2023-11-07 RX ORDER — BUPROPION HYDROCHLORIDE 150 MG/1
TABLET, FILM COATED, EXTENDED RELEASE ORAL
Qty: 60 TABLET | Refills: 2 | Status: SHIPPED | OUTPATIENT
Start: 2023-11-07 | End: 2023-12-07

## 2023-11-07 RX ORDER — FLUTICASONE FUROATE, UMECLIDINIUM BROMIDE AND VILANTEROL TRIFENATATE 100; 62.5; 25 UG/1; UG/1; UG/1
POWDER RESPIRATORY (INHALATION)
Qty: 180 EACH | Refills: 3 | Status: SHIPPED | OUTPATIENT
Start: 2023-11-07

## 2023-11-07 RX ORDER — ALBUTEROL SULFATE 90 UG/1
AEROSOL, METERED RESPIRATORY (INHALATION)
Qty: 3 EACH | Refills: 3 | Status: SHIPPED | OUTPATIENT
Start: 2023-11-07

## 2023-11-07 RX ORDER — ASPIRIN 81 MG/1
81 TABLET ORAL DAILY
Qty: 90 TABLET | Refills: 3 | Status: SHIPPED | OUTPATIENT
Start: 2023-11-07

## 2023-11-07 RX ORDER — FLUTICASONE PROPIONATE AND SALMETEROL XINAFOATE 45; 21 UG/1; UG/1
AEROSOL, METERED RESPIRATORY (INHALATION)
Status: CANCELLED | OUTPATIENT
Start: 2023-11-07

## 2023-11-07 RX ORDER — FLUTICASONE FUROATE, UMECLIDINIUM BROMIDE AND VILANTEROL TRIFENATATE 100; 62.5; 25 UG/1; UG/1; UG/1
POWDER RESPIRATORY (INHALATION)
Qty: 90 EACH | Refills: 3 | Status: SHIPPED | OUTPATIENT
Start: 2023-11-07 | End: 2023-11-07 | Stop reason: SDUPTHER

## 2023-11-07 RX ORDER — PANTOPRAZOLE SODIUM 20 MG/1
TABLET, DELAYED RELEASE ORAL
COMMUNITY
End: 2023-11-07

## 2023-11-07 RX ORDER — FLUTICASONE PROPIONATE AND SALMETEROL XINAFOATE 45; 21 UG/1; UG/1
AEROSOL, METERED RESPIRATORY (INHALATION)
COMMUNITY
End: 2023-11-07 | Stop reason: ALTCHOICE

## 2023-11-07 NOTE — TELEPHONE ENCOUNTER
fluticasone-umeclidin-vilant (Agnes Lion) 100-62.5-25 MCG/ACT AEPB inhaler [[id number]]     Order Details  Dose, Route, Frequency: As Directed   Dispense Quantity: 90 each Refills: 3          Sig: INHALE ONE PUFF BY MOUTH DAILY       Pharmacy calling to clarify qty of 80?     If this is supposed to be a 90 day supply pharmacist informs to change 180 please

## 2023-11-07 NOTE — PROGRESS NOTES
Trelegy, continue current dose, work on smoking cessation, continue rescue inhaler     albuterol sulfate HFA (PROVENTIL;VENTOLIN;PROAIR) 108 (90 Base) MCG/ACT inhaler [15976]           Diet-controlled type 2 diabetes mellitus (HCC)        A1C 6.2, advised on healthy diet, reducing sweets    HM DIABETES FOOT EXAM [HM7 Custom]           Current every day smoker        Ready to quit with Bupropion, identified triggers    buPROPion (ZYBAN) 150 MG extended release tablet [34651]           Coronary artery disease involving native coronary artery of native heart without angina pectoris        Tolerating statin, will add baby ASA, encouraged smoking cessation, increase exercise    aspirin 81 MG EC tablet [678]           Personal history of tobacco use        aspirin 81 MG EC tablet [498]                    FOLLOW UP AND INSTRUCTIONS:     Return in about 3 months (around 2/7/2024) for chronic conditions, or sooner if needed. Radha Anderson received counseling on the following healthy behaviors: nutrition, exercise, medication adherence, and tobacco cessation    Discussed use, benefit, and side effects of prescribed medications. Barriers to medication compliance addressed. All patient questions answered. Pt voiced understanding. Patient given educational materials - see patient instructions    ASHA Hong - CNP    11/19/2023, 1:08 PM    Please note that this chart was generated using voice recognition Dragon dictation software. Although every effort was made to ensure the accuracy of this automatedtranscription, some errors in transcription may have occurred.

## 2023-11-08 DIAGNOSIS — J43.9 PULMONARY EMPHYSEMA, UNSPECIFIED EMPHYSEMA TYPE (HCC): ICD-10-CM

## 2023-11-09 RX ORDER — FLUTICASONE FUROATE, UMECLIDINIUM BROMIDE AND VILANTEROL TRIFENATATE 100; 62.5; 25 UG/1; UG/1; UG/1
POWDER RESPIRATORY (INHALATION)
Qty: 180 EACH | Refills: 3 | OUTPATIENT
Start: 2023-11-09

## 2023-11-19 ASSESSMENT — ENCOUNTER SYMPTOMS
SHORTNESS OF BREATH: 0
TROUBLE SWALLOWING: 0
ABDOMINAL PAIN: 0
CONSTIPATION: 0
COUGH: 1
DIARRHEA: 0
BACK PAIN: 1
WHEEZING: 0
COLOR CHANGE: 0

## 2023-12-01 DIAGNOSIS — I10 HTN, GOAL BELOW 130/80: ICD-10-CM

## 2023-12-02 RX ORDER — AMLODIPINE BESYLATE 5 MG/1
5 TABLET ORAL DAILY
Qty: 90 TABLET | Refills: 1 | Status: SHIPPED | OUTPATIENT
Start: 2023-12-02

## 2023-12-28 RX ORDER — ATORVASTATIN CALCIUM 40 MG/1
40 TABLET, FILM COATED ORAL DAILY
Qty: 90 TABLET | Refills: 1 | Status: SHIPPED | OUTPATIENT
Start: 2023-12-28

## 2024-04-02 ENCOUNTER — HOSPITAL ENCOUNTER (OUTPATIENT)
Dept: GENERAL RADIOLOGY | Facility: CLINIC | Age: 59
Discharge: HOME | End: 2024-04-04
Payer: MEDICARE

## 2024-04-02 ENCOUNTER — OFFICE VISIT (OUTPATIENT)
Dept: INTERNAL MEDICINE CLINIC | Age: 59
End: 2024-04-02
Payer: MEDICARE

## 2024-04-02 ENCOUNTER — HOSPITAL ENCOUNTER (OUTPATIENT)
Facility: CLINIC | Age: 59
Discharge: HOME | End: 2024-04-04
Payer: MEDICARE

## 2024-04-02 VITALS
DIASTOLIC BLOOD PRESSURE: 68 MMHG | BODY MASS INDEX: 32.88 KG/M2 | HEART RATE: 68 BPM | HEIGHT: 69 IN | SYSTOLIC BLOOD PRESSURE: 130 MMHG | WEIGHT: 222 LBS | OXYGEN SATURATION: 96 %

## 2024-04-02 DIAGNOSIS — I10 HYPERTENSION, UNSPECIFIED TYPE: ICD-10-CM

## 2024-04-02 DIAGNOSIS — E11.9 DIET-CONTROLLED TYPE 2 DIABETES MELLITUS (HCC): ICD-10-CM

## 2024-04-02 DIAGNOSIS — M79.644 PAIN OF RIGHT THUMB: ICD-10-CM

## 2024-04-02 DIAGNOSIS — J43.9 PULMONARY EMPHYSEMA, UNSPECIFIED EMPHYSEMA TYPE (HCC): ICD-10-CM

## 2024-04-02 DIAGNOSIS — M25.531 RIGHT WRIST PAIN: ICD-10-CM

## 2024-04-02 DIAGNOSIS — I70.0 ATHEROSCLEROSIS OF AORTA (HCC): ICD-10-CM

## 2024-04-02 DIAGNOSIS — A60.00 RECURRENT GENITAL HERPES: ICD-10-CM

## 2024-04-02 DIAGNOSIS — Z00.00 MEDICARE ANNUAL WELLNESS VISIT, SUBSEQUENT: Primary | ICD-10-CM

## 2024-04-02 PROCEDURE — 2022F DILAT RTA XM EVC RTNOPTHY: CPT | Performed by: NURSE PRACTITIONER

## 2024-04-02 PROCEDURE — 99213 OFFICE O/P EST LOW 20 MIN: CPT | Performed by: NURSE PRACTITIONER

## 2024-04-02 PROCEDURE — 3017F COLORECTAL CA SCREEN DOC REV: CPT | Performed by: NURSE PRACTITIONER

## 2024-04-02 PROCEDURE — 3046F HEMOGLOBIN A1C LEVEL >9.0%: CPT | Performed by: NURSE PRACTITIONER

## 2024-04-02 PROCEDURE — G0439 PPPS, SUBSEQ VISIT: HCPCS | Performed by: NURSE PRACTITIONER

## 2024-04-02 PROCEDURE — 3023F SPIROM DOC REV: CPT | Performed by: NURSE PRACTITIONER

## 2024-04-02 PROCEDURE — 73110 X-RAY EXAM OF WRIST: CPT

## 2024-04-02 PROCEDURE — 3075F SYST BP GE 130 - 139MM HG: CPT | Performed by: NURSE PRACTITIONER

## 2024-04-02 PROCEDURE — 3078F DIAST BP <80 MM HG: CPT | Performed by: NURSE PRACTITIONER

## 2024-04-02 PROCEDURE — G8417 CALC BMI ABV UP PARAM F/U: HCPCS | Performed by: NURSE PRACTITIONER

## 2024-04-02 PROCEDURE — 4004F PT TOBACCO SCREEN RCVD TLK: CPT | Performed by: NURSE PRACTITIONER

## 2024-04-02 PROCEDURE — 73130 X-RAY EXAM OF HAND: CPT

## 2024-04-02 PROCEDURE — G8427 DOCREV CUR MEDS BY ELIG CLIN: HCPCS | Performed by: NURSE PRACTITIONER

## 2024-04-02 RX ORDER — VALACYCLOVIR HYDROCHLORIDE 1 G/1
1000 TABLET, FILM COATED ORAL DAILY
Qty: 90 TABLET | Refills: 1 | Status: SHIPPED | OUTPATIENT
Start: 2024-04-02

## 2024-04-02 SDOH — ECONOMIC STABILITY: FOOD INSECURITY: WITHIN THE PAST 12 MONTHS, YOU WORRIED THAT YOUR FOOD WOULD RUN OUT BEFORE YOU GOT MONEY TO BUY MORE.: NEVER TRUE

## 2024-04-02 SDOH — ECONOMIC STABILITY: FOOD INSECURITY: WITHIN THE PAST 12 MONTHS, THE FOOD YOU BOUGHT JUST DIDN'T LAST AND YOU DIDN'T HAVE MONEY TO GET MORE.: NEVER TRUE

## 2024-04-02 SDOH — ECONOMIC STABILITY: INCOME INSECURITY: HOW HARD IS IT FOR YOU TO PAY FOR THE VERY BASICS LIKE FOOD, HOUSING, MEDICAL CARE, AND HEATING?: NOT HARD AT ALL

## 2024-04-02 ASSESSMENT — PATIENT HEALTH QUESTIONNAIRE - PHQ9
2. FEELING DOWN, DEPRESSED OR HOPELESS: NOT AT ALL
1. LITTLE INTEREST OR PLEASURE IN DOING THINGS: NOT AT ALL
SUM OF ALL RESPONSES TO PHQ QUESTIONS 1-9: 0
SUM OF ALL RESPONSES TO PHQ QUESTIONS 1-9: 0
SUM OF ALL RESPONSES TO PHQ9 QUESTIONS 1 & 2: 0
SUM OF ALL RESPONSES TO PHQ QUESTIONS 1-9: 0
SUM OF ALL RESPONSES TO PHQ QUESTIONS 1-9: 0

## 2024-04-02 ASSESSMENT — LIFESTYLE VARIABLES
HOW OFTEN DO YOU HAVE A DRINK CONTAINING ALCOHOL: NEVER
HOW MANY STANDARD DRINKS CONTAINING ALCOHOL DO YOU HAVE ON A TYPICAL DAY: PATIENT DOES NOT DRINK

## 2024-04-02 NOTE — PROGRESS NOTES
Completed    Hepatitis A vaccine  Aged Out    Hib vaccine  Aged Out    Polio vaccine  Aged Out    Meningococcal (ACWY) vaccine  Aged Out     Medicare Annual Wellness Visit    Nicole Alexandra is here for Medicare AWV, Discuss Medications (Discuss Valtrex prescription), and Wrist Pain (Right wrist pain, started when lifting a heavy pain. Started 3 weeks ago. )    Assessment & Plan   Medicare annual wellness visit, subsequent  Pulmonary emphysema, unspecified emphysema type (HCC)  Comments:  Stable on Trelegy, albuterol prn, continue current doses, advised on smoking cessation  Atherosclerosis of aorta (HCC)  Comments:  Asymptomatic on statin, baby asa, Advised on smoking cessation  Orders:  -     Lipid Panel; Future  Diet-controlled type 2 diabetes mellitus (HCC)  -     Hemoglobin A1C; Future  -     Microalbumin, Ur  Right wrist pain  -     XR WRIST RIGHT (MIN 3 VIEWS); Future  Pain of right thumb  -     XR HAND RIGHT (MIN 3 VIEWS); Future  Recurrent genital herpes  Comments:  Will increase Valtrex dose  Orders:  -     valACYclovir (VALTREX) 1 g tablet; Take 1 tablet by mouth daily, Disp-90 tablet, R-1Normal  Hypertension, unspecified type  -     CBC with Auto Differential; Future  -     Comprehensive Metabolic Panel; Future  -     TSH with Reflex; Future    Recommendations for Preventive Services Due: see orders and patient instructions/AVS.  Recommended screening schedule for the next 5-10 years is provided to the patient in written form: see Patient Instructions/AVS.     Return in about 6 months (around 10/2/2024) for chronic conditions, or sooner if needed.     Subjective   The following acute and/or chronic problems were also addressed today:  Right wrist - stabbing, burning. Started when she lifted heavy jay of chili. From thumb to mid forearm. Feels like there is a lump and warm. Has tried Ace wrap, ice, did not help. Hurts when she puts anything on it, hurts with movement, decreased ROM with lateral

## 2024-04-02 NOTE — PATIENT INSTRUCTIONS
artery disease, also called heart disease, occurs when a substance called plaque builds up in the vessels that supply oxygen-rich blood to your heart muscle. This can narrow the blood vessels and reduce blood flow. A heart attack happens when blood flow is completely blocked. A high-fat diet, smoking, and other factors increase the risk of heart disease.  Your doctor has found that you have a chance of having heart disease. A heart-healthy lifestyle can help keep your heart healthy and prevent heart disease. This lifestyle includes eating healthy, being active, staying at a weight that's healthy for you, and not smoking or using tobacco. It also includes taking medicines as directed, managing other health conditions, and trying to get a healthy amount of sleep.  Follow-up care is a key part of your treatment and safety. Be sure to make and go to all appointments, and call your doctor if you are having problems. It's also a good idea to know your test results and keep a list of the medicines you take.  How can you care for yourself at home?  Diet    Use less salt when you cook and eat. This helps lower your blood pressure. Taste food before salting. Add only a little salt when you think you need it. With time, your taste buds will adjust to less salt.     Eat fewer snack items, fast foods, canned soups, and other high-salt, high-fat, processed foods.     Read food labels and try to avoid saturated and trans fats. They increase your risk of heart disease by raising cholesterol levels.     Limit the amount of solid fat--butter, margarine, and shortening--you eat. Use olive, peanut, or canola oil when you cook. Bake, broil, and steam foods instead of frying them.     Eat a variety of fruit and vegetables every day. Dark green, deep orange, red, or yellow fruits and vegetables are especially good for you. Examples include spinach, carrots, peaches, and berries.     Foods high in fiber can reduce your cholesterol and

## 2024-04-05 DIAGNOSIS — M19.039 OSTEOARTHRITIS OF WRIST, UNSPECIFIED LATERALITY, UNSPECIFIED OSTEOARTHRITIS TYPE: Primary | ICD-10-CM

## 2024-04-05 NOTE — PROGRESS NOTES
Orders Placed This Encounter   Medications    diclofenac sodium (VOLTAREN) 1 % GEL     Sig: Apply 2 g topically 4 times daily     Dispense:  100 g     Refill:  0

## 2024-04-16 ENCOUNTER — HOSPITAL ENCOUNTER (EMERGENCY)
Age: 59
Discharge: HOME | End: 2024-04-16
Attending: STUDENT IN AN ORGANIZED HEALTH CARE EDUCATION/TRAINING PROGRAM
Payer: MEDICARE

## 2024-04-16 ENCOUNTER — APPOINTMENT (OUTPATIENT)
Dept: GENERAL RADIOLOGY | Age: 59
End: 2024-04-16
Payer: MEDICARE

## 2024-04-16 VITALS
WEIGHT: 217 LBS | SYSTOLIC BLOOD PRESSURE: 187 MMHG | OXYGEN SATURATION: 94 % | TEMPERATURE: 97.9 F | DIASTOLIC BLOOD PRESSURE: 77 MMHG | HEIGHT: 69 IN | RESPIRATION RATE: 19 BRPM | HEART RATE: 85 BPM | BODY MASS INDEX: 32.14 KG/M2

## 2024-04-16 DIAGNOSIS — M25.461 KNEE EFFUSION, RIGHT: Primary | ICD-10-CM

## 2024-04-16 PROCEDURE — 73562 X-RAY EXAM OF KNEE 3: CPT

## 2024-04-16 PROCEDURE — 96372 THER/PROPH/DIAG INJ SC/IM: CPT

## 2024-04-16 PROCEDURE — 6370000000 HC RX 637 (ALT 250 FOR IP): Performed by: STUDENT IN AN ORGANIZED HEALTH CARE EDUCATION/TRAINING PROGRAM

## 2024-04-16 PROCEDURE — 99284 EMERGENCY DEPT VISIT MOD MDM: CPT

## 2024-04-16 PROCEDURE — 6360000002 HC RX W HCPCS: Performed by: STUDENT IN AN ORGANIZED HEALTH CARE EDUCATION/TRAINING PROGRAM

## 2024-04-16 RX ORDER — OXYCODONE HYDROCHLORIDE 5 MG/1
10 TABLET ORAL ONCE
Status: COMPLETED | OUTPATIENT
Start: 2024-04-16 | End: 2024-04-16

## 2024-04-16 RX ORDER — KETOROLAC TROMETHAMINE 30 MG/ML
30 INJECTION, SOLUTION INTRAMUSCULAR; INTRAVENOUS ONCE
Status: COMPLETED | OUTPATIENT
Start: 2024-04-16 | End: 2024-04-16

## 2024-04-16 RX ADMIN — KETOROLAC TROMETHAMINE 30 MG: 30 INJECTION, SOLUTION INTRAMUSCULAR at 12:02

## 2024-04-16 RX ADMIN — OXYCODONE HYDROCHLORIDE 10 MG: 5 TABLET ORAL at 12:03

## 2024-04-16 ASSESSMENT — PAIN SCALES - GENERAL
PAINLEVEL_OUTOF10: 10
PAINLEVEL_OUTOF10: 10

## 2024-04-16 NOTE — ED NOTES
Mode of arrival (squad #, walk in, police, etc) : Walk in         Chief complaint(s): knee pain         Arrival Note (brief scenario, treatment PTA, etc).: Pt states right knee pain when she was walking down the stairs yesterday.         C= \"Have you ever felt that you should Cut down on your drinking?\"  No  A= \"Have people Annoyed you by criticizing your drinking?\"  No  G= \"Have you ever felt bad or Guilty about your drinking?\"  No  E= \"Have you ever had a drink as an Eye-opener first thing in the morning to steady your nerves or to help a hangover?\"  No      Deferred []      Reason for deferring: N/A    *If yes to two or more: probable alcohol abuse.*

## 2024-04-16 NOTE — DISCHARGE INSTRUCTIONS
Please wear the knee brace at all times except for sleeping and bathing you may wear it when you sleep if you like  Please utilize the crutches for your walker whenever you are walking  Please take home medication as needed for pain control  And please follow-up with Dr. Youngblood as soon as possible as you will likely need an MRI  And return to the ER with any severe worsening of symptoms especially if you reinjure yourself

## 2024-04-16 NOTE — ED PROVIDER NOTES
[facility]  Emergency Department Encounter  Emergency Medicine Physician     Pt Name: Nicole Alexandra  MRN: [id number]  Birthdate 1965  Date of evaluation: 4/16/24  PCP:  Julia Black APRN - CNP    CHIEF COMPLAINT       Chief Complaint   Patient presents with    Knee Pain     Rt          HISTORY OF PRESENT ILLNESS  (Location/Symptom, Timing/Onset, Context/Setting, Quality, Duration, Modifying Factors, Severity.)    Nicole Alexandra is a 58 y.o. female who presents with right knee pain. Ongoing for three days  States she fell up a stair, felt a pop and since then has felt as if her knee has been unstable. Endorses some clicking and popping. Denies any new falls or trauma. States the pain worsened and decided to come in the ER. Denies paresthesias or numbness. Denies thigh, hip, or lower leg or foot pain         PAST MEDICAL / SURGICAL / SOCIAL / FAMILY HISTORY    has a past medical history of Abnormal Pap smear of cervix, Anxiety, Arthritis, Carpal tunnel syndrome, bilateral, cataracts, Fractures, Hepatitis C antibody positive in blood, History of ankle fracture, History of pneumonia, HSV-1 (herpes simplex virus 1) infection, Hyperlipidemia, Hypertension, Mood swings, Newly diagnosed diabetes (HCC), Obesity, unspecified, Pulmonary emphysema, unspecified emphysema type (HCC), S/P removal of right ovary, Status post carpal tunnel release of both wrists, Urinary frequency, and Vision abnormalities.     has a past surgical history that includes Appendectomy; Salpingo-oophorectomy (Right, 1987); Carpal tunnel release (Right, 10/06/2014); Tonsillectomy; Carpal tunnel release (Left, 09/08/2015); Colonoscopy (2015); Colonoscopy (N/A, 08/25/2020); and Partial hysterectomy.    Social History     Socioeconomic History    Marital status: Single     Spouse name: Not on file    Number of children: Not on file    Years of education: Not on file    Highest education level: Not on file   Occupational

## 2024-04-17 ASSESSMENT — ENCOUNTER SYMPTOMS
COUGH: 0
NAUSEA: 0
VOMITING: 0
ABDOMINAL PAIN: 0
SHORTNESS OF BREATH: 0

## 2024-04-26 ENCOUNTER — TELEPHONE (OUTPATIENT)
Dept: INTERNAL MEDICINE CLINIC | Age: 59
End: 2024-04-26

## 2024-04-29 NOTE — TELEPHONE ENCOUNTER
Writer called and informed patient of the provider's response. Patient states that she was given a name of a hand/ortho specialist during her last visit with the provider and she will go ahead and contact the specialist that was mentioned during said visit.

## 2024-05-29 ENCOUNTER — OFFICE VISIT (OUTPATIENT)
Dept: ORTHOPEDIC SURGERY | Age: 59
End: 2024-05-29

## 2024-05-29 VITALS — RESPIRATION RATE: 14 BRPM | WEIGHT: 217 LBS | HEIGHT: 69 IN | BODY MASS INDEX: 32.14 KG/M2

## 2024-05-29 DIAGNOSIS — I10 HTN, GOAL BELOW 130/80: ICD-10-CM

## 2024-05-29 DIAGNOSIS — M65.4 DE QUERVAIN'S DISEASE (TENOSYNOVITIS): ICD-10-CM

## 2024-05-29 DIAGNOSIS — M18.11 PRIMARY OSTEOARTHRITIS OF FIRST CARPOMETACARPAL JOINT OF RIGHT HAND: Primary | ICD-10-CM

## 2024-05-29 RX ORDER — AMLODIPINE BESYLATE 5 MG/1
5 TABLET ORAL DAILY
Qty: 90 TABLET | Refills: 1 | Status: SHIPPED | OUTPATIENT
Start: 2024-05-29

## 2024-05-29 RX ORDER — LIDOCAINE HYDROCHLORIDE 10 MG/ML
1 INJECTION, SOLUTION INFILTRATION; PERINEURAL ONCE
Status: COMPLETED | OUTPATIENT
Start: 2024-05-29 | End: 2024-05-29

## 2024-05-29 RX ORDER — BETAMETHASONE SODIUM PHOSPHATE AND BETAMETHASONE ACETATE 3; 3 MG/ML; MG/ML
6 INJECTION, SUSPENSION INTRA-ARTICULAR; INTRALESIONAL; INTRAMUSCULAR; SOFT TISSUE ONCE
Status: COMPLETED | OUTPATIENT
Start: 2024-05-29 | End: 2024-05-29

## 2024-05-29 RX ADMIN — LIDOCAINE HYDROCHLORIDE 1 ML: 10 INJECTION, SOLUTION INFILTRATION; PERINEURAL at 12:12

## 2024-05-29 RX ADMIN — BETAMETHASONE SODIUM PHOSPHATE AND BETAMETHASONE ACETATE 6 MG: 3; 3 INJECTION, SUSPENSION INTRA-ARTICULAR; INTRALESIONAL; INTRAMUSCULAR; SOFT TISSUE at 12:11

## 2024-05-29 NOTE — PROGRESS NOTES
[facility]                Michele Mendez PA-C            [address], [address]               [place], [place] [address]           Dept Phone: [phone]           Dept Fax:  [phone] [address] [facility]                       [address]           [place], [place] [address]          Dept Phone: [phone]           Dept Fax:  [phone]      Chief Compliant:  Chief Complaint   Patient presents with    Wrist Pain     Right wrist/hand pain        History of Present Illness:  This is a 59 y.o. female who presents to the clinic today for evaluation of 3-month history of right wrist and thumb pain.  Patient reports pain is most severe to the base of the right thumb with radiation to the dorsal aspect of the digit itself as well as with proximal radiation of the dorsal radial forearm.  Patient denies any injury or trauma prior to the onset of pain.  She states her pain is significantly aggravated by any attempted movement including circumduction flexion and extension of the thumb.  She has great difficulty with any sort of gripping and feels like the pain does limit range of motion of the wrist just in general.    She does note a history of bilateral carpal tunnel syndrome with subsequent carpal tunnel release in 2015.  She denies any significant recurrence of this no numbness or tingling at this time.    Treatment attempted this point includes ibuprofen, Voltaren gel as well as bracing and wrapping.  She also takes Percocet 7.5 mg 4 times daily for other chronic pain issues.     Past History:    Current Outpatient Medications:     diclofenac sodium (VOLTAREN) 1 % GEL, Apply 2 g topically 4 times daily, Disp: 100 g, Rfl: 0    valACYclovir (VALTREX) 1 g tablet, Take 1 tablet by mouth daily, Disp: 90 tablet, Rfl: 1    atorvastatin (LIPITOR) 40 MG tablet, TAKE 1 TABLET BY MOUTH DAILY, Disp: 90 tablet, Rfl: 1    amLODIPine (NORVASC) 5 MG tablet, TAKE 1 TABLET BY MOUTH

## 2024-06-28 RX ORDER — ATORVASTATIN CALCIUM 40 MG/1
40 TABLET, FILM COATED ORAL DAILY
Qty: 90 TABLET | Refills: 1 | Status: SHIPPED | OUTPATIENT
Start: 2024-06-28

## 2024-09-24 ENCOUNTER — OFFICE VISIT (OUTPATIENT)
Dept: ORTHOPEDIC SURGERY | Age: 59
End: 2024-09-24

## 2024-09-24 VITALS — HEIGHT: 69 IN | BODY MASS INDEX: 32.58 KG/M2 | WEIGHT: 220 LBS | RESPIRATION RATE: 15 BRPM

## 2024-09-24 DIAGNOSIS — M65.4 DE QUERVAIN'S DISEASE (TENOSYNOVITIS): ICD-10-CM

## 2024-09-24 DIAGNOSIS — M18.11 PRIMARY OSTEOARTHRITIS OF FIRST CARPOMETACARPAL JOINT OF RIGHT HAND: Primary | ICD-10-CM

## 2024-09-24 RX ORDER — LIDOCAINE HYDROCHLORIDE 10 MG/ML
2 INJECTION, SOLUTION INFILTRATION; PERINEURAL ONCE
Status: COMPLETED | OUTPATIENT
Start: 2024-09-24 | End: 2024-09-24

## 2024-09-24 RX ORDER — BUPIVACAINE HYDROCHLORIDE 2.5 MG/ML
2 INJECTION, SOLUTION INFILTRATION; PERINEURAL ONCE
Status: SHIPPED | OUTPATIENT
Start: 2024-09-24

## 2024-09-24 RX ORDER — BETAMETHASONE SODIUM PHOSPHATE AND BETAMETHASONE ACETATE 3; 3 MG/ML; MG/ML
12 INJECTION, SUSPENSION INTRA-ARTICULAR; INTRALESIONAL; INTRAMUSCULAR; SOFT TISSUE ONCE
Status: DISCONTINUED | OUTPATIENT
Start: 2024-09-24 | End: 2024-09-26

## 2024-09-24 RX ADMIN — LIDOCAINE HYDROCHLORIDE 2 ML: 10 INJECTION, SOLUTION INFILTRATION; PERINEURAL at 11:46

## 2024-09-25 RX ORDER — BETAMETHASONE SODIUM PHOSPHATE AND BETAMETHASONE ACETATE 3; 3 MG/ML; MG/ML
6 INJECTION, SUSPENSION INTRA-ARTICULAR; INTRALESIONAL; INTRAMUSCULAR; SOFT TISSUE ONCE
Status: COMPLETED | OUTPATIENT
Start: 2024-09-25 | End: 2024-09-25

## 2024-09-25 RX ADMIN — BETAMETHASONE SODIUM PHOSPHATE AND BETAMETHASONE ACETATE 6 MG: 3; 3 INJECTION, SUSPENSION INTRA-ARTICULAR; INTRALESIONAL; INTRAMUSCULAR; SOFT TISSUE at 08:15

## 2024-09-26 DIAGNOSIS — A60.00 RECURRENT GENITAL HERPES: ICD-10-CM

## 2024-09-26 RX ORDER — VALACYCLOVIR HYDROCHLORIDE 1 G/1
1000 TABLET, FILM COATED ORAL DAILY
Qty: 90 TABLET | Refills: 1 | Status: SHIPPED | OUTPATIENT
Start: 2024-09-26

## 2024-10-01 ENCOUNTER — OFFICE VISIT (OUTPATIENT)
Dept: INTERNAL MEDICINE CLINIC | Age: 59
End: 2024-10-01

## 2024-10-01 VITALS
WEIGHT: 219 LBS | HEART RATE: 88 BPM | SYSTOLIC BLOOD PRESSURE: 122 MMHG | OXYGEN SATURATION: 93 % | DIASTOLIC BLOOD PRESSURE: 68 MMHG | HEIGHT: 69 IN | BODY MASS INDEX: 32.44 KG/M2

## 2024-10-01 DIAGNOSIS — E11.9 DIET-CONTROLLED TYPE 2 DIABETES MELLITUS (HCC): Primary | ICD-10-CM

## 2024-10-01 DIAGNOSIS — L30.9 DERMATITIS: ICD-10-CM

## 2024-10-01 DIAGNOSIS — Z12.31 BREAST CANCER SCREENING BY MAMMOGRAM: ICD-10-CM

## 2024-10-01 DIAGNOSIS — I10 HYPERTENSION GOAL BP (BLOOD PRESSURE) < 130/80: ICD-10-CM

## 2024-10-01 LAB — HBA1C MFR BLD: 6.2 %

## 2024-10-01 RX ORDER — LISINOPRIL 10 MG/1
10 TABLET ORAL DAILY
Qty: 90 TABLET | Refills: 1 | Status: SHIPPED | OUTPATIENT
Start: 2024-10-01

## 2024-10-01 RX ORDER — CLOBETASOL PROPIONATE 0.5 MG/G
OINTMENT TOPICAL
Qty: 30 G | Refills: 0 | Status: SHIPPED | OUTPATIENT
Start: 2024-10-01

## 2024-10-01 NOTE — PROGRESS NOTES
Effort: Pulmonary effort is normal.      Breath sounds: Normal breath sounds. No wheezing or rhonchi.   Abdominal:      General: Bowel sounds are normal.      Palpations: Abdomen is soft.      Tenderness: There is no abdominal tenderness.   Musculoskeletal:      Cervical back: Normal range of motion and neck supple. No tenderness. Normal range of motion.      Thoracic back: No tenderness.      Lumbar back: Tenderness present. Decreased range of motion.      Right lower leg: No edema.      Left lower leg: No edema.   Lymphadenopathy:      Cervical: No cervical adenopathy.   Skin:     General: Skin is dry.      Findings: Rash present. Rash is not scaling or vesicular.          Neurological:      Mental Status: She is alert and oriented to person, place, and time.      Gait: Gait normal.   Psychiatric:         Mood and Affect: Mood normal.         Behavior: Behavior normal.          LABORATORY FINDINGS:    CBC:   Lab Results   Component Value Date/Time    WBC 11.1 10/26/2023 08:50 AM    HGB 16.5 10/26/2023 08:50 AM     10/26/2023 08:50 AM     BMP:   Lab Results   Component Value Date/Time     10/26/2023 08:50 AM    K 4.6 10/26/2023 08:50 AM    CL 99 10/26/2023 08:50 AM    CO2 29 10/26/2023 08:50 AM    BUN 10 10/26/2023 08:50 AM    CREATININE 0.5 10/26/2023 08:50 AM    GLUCOSE 87 10/26/2023 08:50 AM     HEMOGLOBIN A1C:   Lab Results   Component Value Date/Time    LABA1C 6.2 10/01/2024 12:43 PM     FASTING LIPID PANEL:   Lab Results   Component Value Date    CHOL 128 10/26/2023    HDL 29 (L) 10/26/2023    TRIG 119 10/26/2023       ASSESSMENT AND PLAN:      Visit Diagnoses and Associated Orders       Diet-controlled type 2 diabetes mellitus (HCC)    -  Primary    Controlled, A1C 6.2 today, advised on healthy diet, exercise    POCT glycosylated hemoglobin (Hb A1C) [34573 Custom]      lisinopril (PRINIVIL;ZESTRIL) 10 MG tablet [31231]           Dermatitis        Recurrent, will treat with topical steroid.

## 2024-10-15 ENCOUNTER — HOSPITAL ENCOUNTER (OUTPATIENT)
Dept: WOMENS IMAGING | Age: 59
Discharge: HOME | End: 2024-10-17
Payer: MEDICARE

## 2024-10-15 VITALS — WEIGHT: 219 LBS | HEIGHT: 69 IN | BODY MASS INDEX: 32.44 KG/M2

## 2024-10-15 DIAGNOSIS — Z12.31 BREAST CANCER SCREENING BY MAMMOGRAM: ICD-10-CM

## 2024-10-15 PROCEDURE — 77063 BREAST TOMOSYNTHESIS BI: CPT

## 2024-11-11 ENCOUNTER — TELEPHONE (OUTPATIENT)
Dept: INTERNAL MEDICINE CLINIC | Age: 59
End: 2024-11-11

## 2024-11-11 DIAGNOSIS — I10 HTN, GOAL BELOW 130/80: Primary | ICD-10-CM

## 2024-11-11 RX ORDER — AMLODIPINE BESYLATE 5 MG/1
5 TABLET ORAL DAILY
Qty: 90 TABLET | Refills: 1 | Status: SHIPPED | OUTPATIENT
Start: 2024-11-11

## 2024-11-11 NOTE — TELEPHONE ENCOUNTER
A1C is improved.  Should complete labs which were ordered in April (not the A1C though)  OK to resume norvasc 5 mg, but continue taking lisinopril for diabetic renal protection.   Orders Placed This Encounter   Medications    amLODIPine (NORVASC) 5 MG tablet     Sig: Take 1 tablet by mouth daily     Dispense:  90 tablet     Refill:  1

## 2024-11-11 NOTE — TELEPHONE ENCOUNTER
Patient states she had a house call visit through [facility] today.(this is done once a year)    During her visit today her BP in her left arm was 140/90 and the right arm was 150/98. They did not recheck it.    She states that her BP medication was changed the last time she was here.    She also states that they did an A1C test and it was 5.8    Patient wanted you to know. She is asking if there is anything she needs to do.    Megan Vilchis

## 2024-11-14 DIAGNOSIS — J43.9 PULMONARY EMPHYSEMA, UNSPECIFIED EMPHYSEMA TYPE (HCC): ICD-10-CM

## 2024-11-14 RX ORDER — FLUTICASONE FUROATE, UMECLIDINIUM BROMIDE AND VILANTEROL TRIFENATATE 100; 62.5; 25 UG/1; UG/1; UG/1
POWDER RESPIRATORY (INHALATION)
Qty: 60 EACH | Refills: 2 | Status: SHIPPED | OUTPATIENT
Start: 2024-11-14

## 2024-12-09 ENCOUNTER — TELEPHONE (OUTPATIENT)
Dept: INTERNAL MEDICINE CLINIC | Age: 59
End: 2024-12-09

## 2024-12-09 DIAGNOSIS — R21 RASH: Primary | ICD-10-CM

## 2024-12-09 NOTE — TELEPHONE ENCOUNTER
Patient had been advised by Julia BARRY to contact office back if the Temovate ointment did not help and she would refer to a Dermatologist. Please advise

## 2024-12-23 RX ORDER — ATORVASTATIN CALCIUM 40 MG/1
40 TABLET, FILM COATED ORAL DAILY
Qty: 90 TABLET | Refills: 1 | Status: SHIPPED | OUTPATIENT
Start: 2024-12-23

## 2025-01-27 ENCOUNTER — TELEPHONE (OUTPATIENT)
Dept: ORTHOPEDIC SURGERY | Age: 60
End: 2025-01-27

## 2025-01-27 NOTE — TELEPHONE ENCOUNTER
LM on VM letting pt know that we need a BRENDON signed in order to send any records to [facility] for pain management because we did not refer her there. She can come in or we can send a blank one through [employer], she can go into [facility] and sign one then have them fax it to us.

## 2025-01-27 NOTE — TELEPHONE ENCOUNTER
[facility] for pain management called for last office visit note to be faxed to them at [phone]

## 2025-02-11 DIAGNOSIS — J43.9 PULMONARY EMPHYSEMA, UNSPECIFIED EMPHYSEMA TYPE (HCC): ICD-10-CM

## 2025-02-11 RX ORDER — FLUTICASONE FUROATE, UMECLIDINIUM BROMIDE AND VILANTEROL TRIFENATATE 100; 62.5; 25 UG/1; UG/1; UG/1
POWDER RESPIRATORY (INHALATION)
Qty: 60 EACH | Refills: 1 | Status: SHIPPED | OUTPATIENT
Start: 2025-02-11

## 2025-02-19 ENCOUNTER — OFFICE VISIT (OUTPATIENT)
Dept: FAMILY MEDICINE CLINIC | Age: 60
End: 2025-02-19

## 2025-02-19 VITALS
SYSTOLIC BLOOD PRESSURE: 102 MMHG | DIASTOLIC BLOOD PRESSURE: 65 MMHG | TEMPERATURE: 98 F | HEART RATE: 89 BPM | OXYGEN SATURATION: 93 %

## 2025-02-19 DIAGNOSIS — R06.2 WHEEZING: ICD-10-CM

## 2025-02-19 DIAGNOSIS — J40 BRONCHITIS: Primary | ICD-10-CM

## 2025-02-19 RX ORDER — BENZONATATE 200 MG/1
200 CAPSULE ORAL 3 TIMES DAILY PRN
Qty: 30 CAPSULE | Refills: 0 | Status: SHIPPED | OUTPATIENT
Start: 2025-02-19 | End: 2025-03-01

## 2025-02-19 RX ORDER — IPRATROPIUM BROMIDE AND ALBUTEROL SULFATE 2.5; .5 MG/3ML; MG/3ML
1 SOLUTION RESPIRATORY (INHALATION) ONCE
Status: COMPLETED | OUTPATIENT
Start: 2025-02-19 | End: 2025-02-19

## 2025-02-19 RX ORDER — PREDNISONE 20 MG/1
20 TABLET ORAL 2 TIMES DAILY
Qty: 10 TABLET | Refills: 0 | Status: SHIPPED | OUTPATIENT
Start: 2025-02-19 | End: 2025-02-24

## 2025-02-19 RX ORDER — AZITHROMYCIN 250 MG/1
TABLET, FILM COATED ORAL
Qty: 6 TABLET | Refills: 0 | Status: SHIPPED | OUTPATIENT
Start: 2025-02-19 | End: 2025-03-01

## 2025-02-19 RX ORDER — GUAIFENESIN 600 MG/1
600 TABLET, EXTENDED RELEASE ORAL 2 TIMES DAILY
Qty: 30 TABLET | Refills: 0 | Status: SHIPPED | OUTPATIENT
Start: 2025-02-19 | End: 2025-03-06

## 2025-02-19 RX ADMIN — IPRATROPIUM BROMIDE AND ALBUTEROL SULFATE 1 DOSE: 2.5; .5 SOLUTION RESPIRATORY (INHALATION) at 11:35

## 2025-02-19 ASSESSMENT — ENCOUNTER SYMPTOMS
HEARTBURN: 0
SORE THROAT: 1
RHINORRHEA: 0
SHORTNESS OF BREATH: 1
VOICE CHANGE: 1
COUGH: 1
EYE REDNESS: 0
HEMOPTYSIS: 0
WHEEZING: 1
NAUSEA: 1
VOMITING: 1
EYE PAIN: 0
EYE ITCHING: 0
DIARRHEA: 1

## 2025-02-19 NOTE — PROGRESS NOTES
[facility] PHYSICIANS [facility], [facility] WALK-[facility]IC[facility]  [address]  [address]  [facility] [address]  Dept: [phone]  Dept Fax: [phone]    Nicole Alexandra is a 59 y.o. female who presents to the urgent care today for her medical conditions/complaints as notedbelow.  Nicole Alexandra is c/o of Cold Symptoms (Onset for 6 days with cough, raspy voice, diarrhea, vomiting, sore throat, ear pain, wheezing and chest congestion. Sx;s are improving Otc inhaler, mucinex, dayquil and nyquil.)      HPI:     Patient presents to the Walk In Clinic for evaluation of a cough with congestion, onset 6 days. Patient is a current smoker, 1 pack a day.     Patient Care Team:  Julia Black APRN - CNP as PCP - General (Internal Medicine)  Julia Black APRN - CNP as PCP - Empaneled Provider  Ghulam Dixon DO as Consulting Physician (Obstetrics & Gynecology)      Cough  This is a new problem. The current episode started in the past 7 days. The problem has been unchanged. The problem occurs constantly. The cough is Productive of sputum. Associated symptoms include ear congestion, ear pain, a fever, myalgias, nasal congestion, a sore throat, shortness of breath and wheezing. Pertinent negatives include no chest pain, chills, eye redness, headaches, heartburn, hemoptysis, postnasal drip, rash, rhinorrhea, sweats or weight loss. She has tried a beta-agonist inhaler, steroid inhaler, ipratropium inhaler, OTC cough suppressant and rest for the symptoms. The treatment provided mild relief. Her past medical history is significant for bronchitis, COPD and pneumonia. There is no history of asthma.       Past Medical History:   Diagnosis Date    Abnormal Pap smear of cervix 01/17/2017    Anxiety 01/24/2017    Arthritis     left ankle, wears ankle brace    Carpal tunnel syndrome, bilateral     cataracts     Fractures     Hepatitis C antibody positive in blood     History of

## 2025-02-20 ENCOUNTER — TELEPHONE (OUTPATIENT)
Dept: INTERNAL MEDICINE CLINIC | Age: 60
End: 2025-02-20

## 2025-02-20 DIAGNOSIS — J20.9 ACUTE BRONCHITIS, UNSPECIFIED ORGANISM: ICD-10-CM

## 2025-02-20 DIAGNOSIS — J43.9 PULMONARY EMPHYSEMA, UNSPECIFIED EMPHYSEMA TYPE (HCC): Primary | ICD-10-CM

## 2025-02-20 RX ORDER — ALBUTEROL SULFATE 0.83 MG/ML
2.5 SOLUTION RESPIRATORY (INHALATION) 4 TIMES DAILY PRN
Qty: 360 ML | Refills: 2 | Status: SHIPPED | OUTPATIENT
Start: 2025-02-20 | End: 2025-05-21

## 2025-02-20 NOTE — TELEPHONE ENCOUNTER
Orders Placed This Encounter   Medications    albuterol (PROVENTIL) (2.5 MG/3ML) 0.083% nebulizer solution     Sig: Take 3 mLs by nebulization 4 times daily as needed for Wheezing or Shortness of Breath     Dispense:  360 mL     Refill:  2     Orders Placed This Encounter   Procedures    DME Order for Nebulizer as OP     You must complete the order parameters below and add the medical necessity documentation for this DME in a separate note.    Nebulizer with compressor  Disposable Med Nebs 2 per month  Reusable Med Nebs 1 per 6 months  Aerosol Mask 1 per month  Replacement Filters 2 per month  All other related supplies as needed per month    Frequency:  Three times daily    Diagnosis: Pulmonary emphysema, acute bronchitis    Length of Need: 12 months     Order Specific Question:   Medications being used:     Answer:   Albuterol .083 unit dose vial

## 2025-02-20 NOTE — TELEPHONE ENCOUNTER
LV 10/1/2024  NV NV 4/1/2025    Patient was seen at the ER yesterday and dx with bronchitis. Patient has a dx of COPD as well.    While there she was given a breathing Tx. Patient state that it helped her tremendously and she would like an order for a nebulizer machine and the solution sent in for her.    Please advise.

## 2025-02-20 NOTE — TELEPHONE ENCOUNTER
Patient informed DME was faxed and medication was sent to the pharmacy    Spoke with [facility] for the fax number [phone]

## 2025-02-21 NOTE — TELEPHONE ENCOUNTER
Received fax from the company the patient provided and they state they do not provide services in the [place] area.     Spoke with patient and we agreed on [facility]. Gave patient address and phone number and faxed information to [facility].

## 2025-03-22 DIAGNOSIS — A60.00 RECURRENT GENITAL HERPES: ICD-10-CM

## 2025-03-24 DIAGNOSIS — E11.9 DIET-CONTROLLED TYPE 2 DIABETES MELLITUS (HCC): ICD-10-CM

## 2025-03-24 DIAGNOSIS — I10 HYPERTENSION GOAL BP (BLOOD PRESSURE) < 130/80: ICD-10-CM

## 2025-03-24 DIAGNOSIS — J43.9 PULMONARY EMPHYSEMA, UNSPECIFIED EMPHYSEMA TYPE (HCC): ICD-10-CM

## 2025-03-24 RX ORDER — VALACYCLOVIR HYDROCHLORIDE 1 G/1
1000 TABLET, FILM COATED ORAL DAILY
Qty: 90 TABLET | Refills: 1 | Status: SHIPPED | OUTPATIENT
Start: 2025-03-24

## 2025-03-24 RX ORDER — LISINOPRIL 10 MG/1
10 TABLET ORAL DAILY
Qty: 90 TABLET | Refills: 0 | Status: SHIPPED | OUTPATIENT
Start: 2025-03-24

## 2025-03-24 RX ORDER — ALBUTEROL SULFATE 90 UG/1
INHALANT RESPIRATORY (INHALATION)
Qty: 25.5 G | Refills: 0 | Status: SHIPPED | OUTPATIENT
Start: 2025-03-24

## 2025-04-03 ENCOUNTER — TELEPHONE (OUTPATIENT)
Dept: INTERNAL MEDICINE CLINIC | Age: 60
End: 2025-04-03

## 2025-04-03 DIAGNOSIS — E11.9 DIET-CONTROLLED TYPE 2 DIABETES MELLITUS (HCC): ICD-10-CM

## 2025-04-03 DIAGNOSIS — I25.10 CORONARY ARTERY DISEASE INVOLVING NATIVE CORONARY ARTERY OF NATIVE HEART WITHOUT ANGINA PECTORIS: ICD-10-CM

## 2025-04-03 DIAGNOSIS — I10 HYPERTENSION GOAL BP (BLOOD PRESSURE) < 130/80: Primary | ICD-10-CM

## 2025-04-03 NOTE — TELEPHONE ENCOUNTER
----- Message from Precilla P sent at 4/3/2025  8:50 AM EDT -----  Regarding: ECC Message to Provider  ECC Message to Provider    Relationship to Patient: Self     Additional Information pt will be having an appt this 5/27/2025 at 10:30 am but wanted to know if she needs to have a blood work during that appt.  --------------------------------------------------------------------------------------------------------------------------    Call Back Information: OK to leave message on voicemail  Preferred Call Back Number: Phone [phone]

## 2025-04-21 DIAGNOSIS — J43.9 PULMONARY EMPHYSEMA, UNSPECIFIED EMPHYSEMA TYPE (HCC): ICD-10-CM

## 2025-04-22 RX ORDER — FLUTICASONE FUROATE, UMECLIDINIUM BROMIDE AND VILANTEROL TRIFENATATE 100; 62.5; 25 UG/1; UG/1; UG/1
POWDER RESPIRATORY (INHALATION)
Qty: 60 EACH | Refills: 2 | Status: SHIPPED | OUTPATIENT
Start: 2025-04-22

## 2025-05-16 DIAGNOSIS — I10 HTN, GOAL BELOW 130/80: ICD-10-CM

## 2025-05-16 RX ORDER — AMLODIPINE BESYLATE 5 MG/1
5 TABLET ORAL DAILY
Qty: 90 TABLET | Refills: 1 | Status: SHIPPED | OUTPATIENT
Start: 2025-05-16

## 2025-05-27 ENCOUNTER — HOSPITAL ENCOUNTER (OUTPATIENT)
Age: 60
Setting detail: SPECIMEN
Discharge: HOME | End: 2025-05-27

## 2025-05-27 ENCOUNTER — OFFICE VISIT (OUTPATIENT)
Dept: INTERNAL MEDICINE CLINIC | Age: 60
End: 2025-05-27
Payer: MEDICARE

## 2025-05-27 VITALS
SYSTOLIC BLOOD PRESSURE: 126 MMHG | DIASTOLIC BLOOD PRESSURE: 84 MMHG | HEIGHT: 69 IN | WEIGHT: 219 LBS | OXYGEN SATURATION: 95 % | BODY MASS INDEX: 32.44 KG/M2 | HEART RATE: 84 BPM

## 2025-05-27 DIAGNOSIS — E11.69 TYPE 2 DIABETES MELLITUS WITH OBESITY (HCC): Primary | ICD-10-CM

## 2025-05-27 DIAGNOSIS — J43.9 PULMONARY EMPHYSEMA, UNSPECIFIED EMPHYSEMA TYPE (HCC): ICD-10-CM

## 2025-05-27 DIAGNOSIS — L30.9 DERMATITIS: ICD-10-CM

## 2025-05-27 DIAGNOSIS — I25.10 CORONARY ARTERY DISEASE INVOLVING NATIVE CORONARY ARTERY OF NATIVE HEART WITHOUT ANGINA PECTORIS: ICD-10-CM

## 2025-05-27 DIAGNOSIS — Z87.891 PERSONAL HISTORY OF TOBACCO USE: ICD-10-CM

## 2025-05-27 DIAGNOSIS — I10 HYPERTENSION GOAL BP (BLOOD PRESSURE) < 130/80: ICD-10-CM

## 2025-05-27 DIAGNOSIS — E11.9 DIET-CONTROLLED TYPE 2 DIABETES MELLITUS (HCC): ICD-10-CM

## 2025-05-27 DIAGNOSIS — E66.9 TYPE 2 DIABETES MELLITUS WITH OBESITY (HCC): Primary | ICD-10-CM

## 2025-05-27 LAB
ALBUMIN SERPL-MCNC: 4.3 G/DL (ref 3.5–5.2)
ALBUMIN/GLOB SERPL: 1.5 {RATIO} (ref 1–2.5)
ALP SERPL-CCNC: 82 U/L (ref 35–104)
ALT SERPL-CCNC: 11 U/L (ref 10–35)
ANION GAP SERPL CALCULATED.3IONS-SCNC: 14 MMOL/L (ref 9–16)
AST SERPL-CCNC: 13 U/L (ref 10–35)
BACTERIA URNS QL MICRO: NORMAL
BASOPHILS # BLD: 0.05 K/UL (ref 0–0.2)
BASOPHILS NFR BLD: 0 % (ref 0–2)
BILIRUB SERPL-MCNC: 0.4 MG/DL (ref 0–1.2)
BILIRUB UR QL STRIP: NEGATIVE
BUN SERPL-MCNC: 16 MG/DL (ref 8–23)
CALCIUM SERPL-MCNC: 10 MG/DL (ref 8.6–10.4)
CASTS #/AREA URNS LPF: NORMAL /LPF (ref 0–8)
CHLORIDE SERPL-SCNC: 101 MMOL/L (ref 98–107)
CHOLEST SERPL-MCNC: 165 MG/DL (ref 0–199)
CHOLESTEROL/HDL RATIO: 5.2
CLARITY UR: ABNORMAL
CO2 SERPL-SCNC: 26 MMOL/L (ref 20–31)
COLOR UR: YELLOW
CREAT SERPL-MCNC: 0.7 MG/DL (ref 0.6–0.9)
EOSINOPHIL # BLD: 0.14 K/UL (ref 0–0.44)
EOSINOPHILS RELATIVE PERCENT: 1 % (ref 1–4)
EPI CELLS #/AREA URNS HPF: NORMAL /HPF (ref 0–5)
ERYTHROCYTE [DISTWIDTH] IN BLOOD BY AUTOMATED COUNT: 13.2 % (ref 11.8–14.4)
EST. AVERAGE GLUCOSE BLD GHB EST-MCNC: 134 MG/DL
GFR, ESTIMATED: >90 ML/MIN/1.73M2
GLUCOSE SERPL-MCNC: 97 MG/DL (ref 74–99)
GLUCOSE UR STRIP-MCNC: NEGATIVE MG/DL
HBA1C MFR BLD: 6.3 % (ref 4–6)
HCT VFR BLD AUTO: 50.8 % (ref 36.3–47.1)
HDLC SERPL-MCNC: 32 MG/DL
HGB BLD-MCNC: 16.2 G/DL (ref 11.9–15.1)
HGB UR QL STRIP.AUTO: NEGATIVE
IMM GRANULOCYTES # BLD AUTO: 0.04 K/UL (ref 0–0.3)
IMM GRANULOCYTES NFR BLD: 0 %
KETONES UR STRIP-MCNC: NEGATIVE MG/DL
LDLC SERPL CALC-MCNC: 76 MG/DL (ref 0–100)
LEUKOCYTE ESTERASE UR QL STRIP: ABNORMAL
LYMPHOCYTES NFR BLD: 3.04 K/UL (ref 1.1–3.7)
LYMPHOCYTES RELATIVE PERCENT: 23 % (ref 24–43)
MCH RBC QN AUTO: 30.2 PG (ref 25.2–33.5)
MCHC RBC AUTO-ENTMCNC: 31.9 G/DL (ref 28.4–34.8)
MCV RBC AUTO: 94.6 FL (ref 82.6–102.9)
MONOCYTES NFR BLD: 1.1 K/UL (ref 0.1–1.2)
MONOCYTES NFR BLD: 8 % (ref 3–12)
NEUTROPHILS NFR BLD: 68 % (ref 36–65)
NEUTS SEG NFR BLD: 9.08 K/UL (ref 1.5–8.1)
NITRITE UR QL STRIP: NEGATIVE
NRBC BLD-RTO: 0 PER 100 WBC
PH UR STRIP: 5.5 [PH] (ref 5–8)
PLATELET # BLD AUTO: 362 K/UL (ref 138–453)
PMV BLD AUTO: 10.1 FL (ref 8.1–13.5)
POTASSIUM SERPL-SCNC: 4.6 MMOL/L (ref 3.7–5.3)
PROT SERPL-MCNC: 7.2 G/DL (ref 6.6–8.7)
PROT UR STRIP-MCNC: NEGATIVE MG/DL
RBC # BLD AUTO: 5.37 M/UL (ref 3.95–5.11)
RBC #/AREA URNS HPF: NORMAL /HPF (ref 0–4)
SODIUM SERPL-SCNC: 141 MMOL/L (ref 136–145)
SP GR UR STRIP: 1.02 (ref 1–1.03)
TRIGL SERPL-MCNC: 283 MG/DL
TSH SERPL DL<=0.05 MIU/L-ACNC: 3.68 UIU/ML (ref 0.27–4.2)
UROBILINOGEN UR STRIP-ACNC: NORMAL EU/DL (ref 0–1)
VLDLC SERPL CALC-MCNC: 57 MG/DL (ref 1–30)
WBC #/AREA URNS HPF: NORMAL /HPF (ref 0–5)
WBC OTHER # BLD: 13.5 K/UL (ref 3.5–11.3)

## 2025-05-27 PROCEDURE — 99214 OFFICE O/P EST MOD 30 MIN: CPT | Performed by: NURSE PRACTITIONER

## 2025-05-27 PROCEDURE — 3023F SPIROM DOC REV: CPT | Performed by: NURSE PRACTITIONER

## 2025-05-27 PROCEDURE — 4004F PT TOBACCO SCREEN RCVD TLK: CPT | Performed by: NURSE PRACTITIONER

## 2025-05-27 PROCEDURE — 3074F SYST BP LT 130 MM HG: CPT | Performed by: NURSE PRACTITIONER

## 2025-05-27 PROCEDURE — G8417 CALC BMI ABV UP PARAM F/U: HCPCS | Performed by: NURSE PRACTITIONER

## 2025-05-27 PROCEDURE — 3079F DIAST BP 80-89 MM HG: CPT | Performed by: NURSE PRACTITIONER

## 2025-05-27 PROCEDURE — G0296 VISIT TO DETERM LDCT ELIG: HCPCS | Performed by: NURSE PRACTITIONER

## 2025-05-27 PROCEDURE — 3044F HG A1C LEVEL LT 7.0%: CPT | Performed by: NURSE PRACTITIONER

## 2025-05-27 PROCEDURE — G8427 DOCREV CUR MEDS BY ELIG CLIN: HCPCS | Performed by: NURSE PRACTITIONER

## 2025-05-27 PROCEDURE — 2022F DILAT RTA XM EVC RTNOPTHY: CPT | Performed by: NURSE PRACTITIONER

## 2025-05-27 PROCEDURE — 3017F COLORECTAL CA SCREEN DOC REV: CPT | Performed by: NURSE PRACTITIONER

## 2025-05-27 RX ORDER — CLOBETASOL PROPIONATE 0.5 MG/G
OINTMENT TOPICAL
Qty: 30 G | Refills: 0 | Status: CANCELLED | OUTPATIENT
Start: 2025-05-27

## 2025-05-27 RX ORDER — LISINOPRIL 10 MG/1
10 TABLET ORAL DAILY
Qty: 90 TABLET | Refills: 1 | Status: SHIPPED | OUTPATIENT
Start: 2025-05-27

## 2025-05-27 RX ORDER — ATORVASTATIN CALCIUM 40 MG/1
40 TABLET, FILM COATED ORAL DAILY
Qty: 90 TABLET | Refills: 1 | Status: SHIPPED | OUTPATIENT
Start: 2025-05-27

## 2025-05-27 SDOH — ECONOMIC STABILITY: FOOD INSECURITY: WITHIN THE PAST 12 MONTHS, THE FOOD YOU BOUGHT JUST DIDN'T LAST AND YOU DIDN'T HAVE MONEY TO GET MORE.: NEVER TRUE

## 2025-05-27 SDOH — ECONOMIC STABILITY: FOOD INSECURITY: WITHIN THE PAST 12 MONTHS, YOU WORRIED THAT YOUR FOOD WOULD RUN OUT BEFORE YOU GOT MONEY TO BUY MORE.: NEVER TRUE

## 2025-05-27 ASSESSMENT — PATIENT HEALTH QUESTIONNAIRE - PHQ9
SUM OF ALL RESPONSES TO PHQ QUESTIONS 1-9: 0
2. FEELING DOWN, DEPRESSED OR HOPELESS: NOT AT ALL
1. LITTLE INTEREST OR PLEASURE IN DOING THINGS: NOT AT ALL

## 2025-05-27 NOTE — PROGRESS NOTES
Have you been to the ER, urgent care clinic since your last visit?  Hospitalized since your last visit?   NO    Have you seen or consulted any other health care providers outside our system since your last visit?   NO      “Have you had a diabetic eye exam?”    NO     No diabetic eye exam on file   
DTaP/Tdap/Td vaccine (1 - Tdap) Never done    Shingles vaccine (1 of 2) Never done    Pneumococcal 50+ years Vaccine (2 of 2 - PCV) 07/29/2021    Respiratory Syncytial Virus (RSV) Pregnant or age 60 yrs+ (1 - Risk 60-74 years 1-dose series) Never done    COVID-19 Vaccine (1 - 2024-25 season) Never done    Lung Cancer Screening &/or Counseling  10/23/2024    Diabetic foot exam  11/07/2024    Annual Wellness Visit (Medicare)  04/03/2025       Past Surgical History:    Past Surgical History:   Procedure Laterality Date    APPENDECTOMY      CARPAL TUNNEL RELEASE Right 10/06/2014    CARPAL TUNNEL RELEASE Left 09/08/2015    x2    COLONOSCOPY  2015    COLONOSCOPY N/A 08/25/2020    COLONOSCOPY DIAGNOSTIC WITH PICTURES performed by Sharath Moss MD at [facility] ENDO    PARTIAL HYSTERECTOMY (CERVIX NOT REMOVED)      SALPINGO-OOPHORECTOMY Right 1987    removed due to infection    TONSILLECTOMY          Medications:      Current Outpatient Medications:     Tirzepatide (MOUNJARO) 2.5 MG/0.5ML SOAJ pen, Inject 2.5 mg into the skin every 7 days, Disp: 2 mL, Rfl: 0    atorvastatin (LIPITOR) 40 MG tablet, Take 1 tablet by mouth daily, Disp: 90 tablet, Rfl: 1    lisinopril (PRINIVIL;ZESTRIL) 10 MG tablet, Take 1 tablet by mouth daily, Disp: 90 tablet, Rfl: 1    amLODIPine (NORVASC) 5 MG tablet, TAKE 1 TABLET BY MOUTH DAILY, Disp: 90 tablet, Rfl: 1    fluticasone-umeclidin-vilant (TRELEGY ELLIPTA) 100-62.5-25 MCG/ACT AEPB inhaler, INHALE 1 PUFF BY MOUTH DAILY, Disp: 60 each, Rfl: 2    valACYclovir (VALTREX) 1 g tablet, TAKE 1 TABLET BY MOUTH DAILY, Disp: 90 tablet, Rfl: 1    albuterol sulfate HFA (PROVENTIL;VENTOLIN;PROAIR) 108 (90 Base) MCG/ACT inhaler, INHALE 2 PUFFS BY MOUTH EVERY 4 HOURS AS NEEDED FOR WHEEZING OR SHORTNESS OF BREATH, Disp: 25.5 g, Rfl: 0    albuterol (PROVENTIL) (2.5 MG/3ML) 0.083% nebulizer solution, Take 3 mLs by nebulization 4 times daily as needed for Wheezing or Shortness of Breath, Disp: 360 mL, Rfl: 2

## 2025-06-11 ENCOUNTER — HOSPITAL ENCOUNTER (OUTPATIENT)
Dept: CT IMAGING | Age: 60
Discharge: HOME | End: 2025-06-13
Payer: MEDICARE

## 2025-06-11 ENCOUNTER — OFFICE VISIT (OUTPATIENT)
Age: 60
End: 2025-06-11
Payer: MEDICARE

## 2025-06-11 VITALS — BODY MASS INDEX: 31.84 KG/M2 | HEIGHT: 69 IN | WEIGHT: 215 LBS

## 2025-06-11 VITALS
WEIGHT: 215 LBS | SYSTOLIC BLOOD PRESSURE: 117 MMHG | HEART RATE: 80 BPM | BODY MASS INDEX: 31.75 KG/M2 | DIASTOLIC BLOOD PRESSURE: 79 MMHG | OXYGEN SATURATION: 95 % | TEMPERATURE: 97.6 F

## 2025-06-11 DIAGNOSIS — Z87.891 PERSONAL HISTORY OF TOBACCO USE: ICD-10-CM

## 2025-06-11 DIAGNOSIS — D48.9 NEOPLASM OF UNCERTAIN BEHAVIOR: ICD-10-CM

## 2025-06-11 DIAGNOSIS — L30.1 DYSHIDROTIC ECZEMA: ICD-10-CM

## 2025-06-11 DIAGNOSIS — D22.9 MULTIPLE NEVI: ICD-10-CM

## 2025-06-11 DIAGNOSIS — L20.89 OTHER ATOPIC DERMATITIS: Primary | ICD-10-CM

## 2025-06-11 PROCEDURE — 3074F SYST BP LT 130 MM HG: CPT | Performed by: DERMATOLOGY

## 2025-06-11 PROCEDURE — G8427 DOCREV CUR MEDS BY ELIG CLIN: HCPCS | Performed by: DERMATOLOGY

## 2025-06-11 PROCEDURE — 3078F DIAST BP <80 MM HG: CPT | Performed by: DERMATOLOGY

## 2025-06-11 PROCEDURE — 99204 OFFICE O/P NEW MOD 45 MIN: CPT | Performed by: DERMATOLOGY

## 2025-06-11 PROCEDURE — 3017F COLORECTAL CA SCREEN DOC REV: CPT | Performed by: DERMATOLOGY

## 2025-06-11 PROCEDURE — 11102 TANGNTL BX SKIN SINGLE LES: CPT | Performed by: DERMATOLOGY

## 2025-06-11 PROCEDURE — G8417 CALC BMI ABV UP PARAM F/U: HCPCS | Performed by: DERMATOLOGY

## 2025-06-11 PROCEDURE — 4004F PT TOBACCO SCREEN RCVD TLK: CPT | Performed by: DERMATOLOGY

## 2025-06-11 PROCEDURE — 71271 CT THORAX LUNG CANCER SCR C-: CPT

## 2025-06-11 RX ORDER — CLOBETASOL PROPIONATE 0.5 MG/G
OINTMENT TOPICAL
Qty: 60 G | Refills: 2 | Status: SHIPPED | OUTPATIENT
Start: 2025-06-11

## 2025-06-11 NOTE — PROGRESS NOTES
negative    Atopic dermatitis, affecting lower ext  Ddx: lichen planus   - chronic illness with progression and/or exacerbation  - discussed diagnosis, etiology, natural course, and treatment options  - discussed risk of developing lichen simplex chronicus with repetitive excoriation.   - intralesional injection in reserve  - continue clobetasol 0.05% once daily until skin texture becomes smooth, then twice weekly for maintenance.       Neoplasm of uncertain behavior, right medial ankle  Ddx: r/o SCC  Shave Biopsy: The procedure and its risks were explained including but not limited to pain, bleeding, infection, permanent scar, permanent pigment alteration and need for an additional procedure. Consent to proceed with the procedure was obtained from the patient or the parent. After cleaning with alcohol the lesion was anesthetized with 1% lidocaine with epinephrine and was removed with a dermablade. Hemostasis was achieved with aluminum chloride and Vaseline and a bandage were applied.          Return in about 2 months (around 8/11/2025).    Future Appointments   Date Time Provider Department Center   6/11/2025  2:30 PM [facility] CT RM 2 FAST SCANNER [facility] CT SCAN [facility] Radiolog   7/29/2025  9:00 AM Julia Black, APRN - CNP [facility] ECC DEP         There are no Patient Instructions on file for this visit.      I, Naomi Mcmanus (scribe), personally scribed the services dictated to me by Dr. Field in this documentation.    I, Dr. Field, personally performed the services described in this documentation, as scribed by Naomi Mcmanus in my presence, and it is both accurate and complete.

## 2025-06-12 ENCOUNTER — RESULTS FOLLOW-UP (OUTPATIENT)
Dept: INTERNAL MEDICINE CLINIC | Age: 60
End: 2025-06-12

## 2025-06-12 ENCOUNTER — LAB (OUTPATIENT)
Dept: LAB | Age: 60
End: 2025-06-12

## 2025-06-17 ENCOUNTER — RESULTS FOLLOW-UP (OUTPATIENT)
Age: 60
End: 2025-06-17

## 2025-06-18 DIAGNOSIS — E11.69 TYPE 2 DIABETES MELLITUS WITH OBESITY (HCC): ICD-10-CM

## 2025-06-18 DIAGNOSIS — E66.9 TYPE 2 DIABETES MELLITUS WITH OBESITY (HCC): ICD-10-CM

## 2025-06-18 NOTE — TELEPHONE ENCOUNTER
Last visit: 05/27/25  Last Med refill: 05/27/25  Does patient have enough medication for 72 hours: Yes    Next Visit Date:  Future Appointments   Date Time Provider Department Center   7/29/2025  9:00 AM Julia Black APRN - CNP [facility]   9/3/2025 10:30 AM Nora Field MD [facility]       Health Maintenance   Topic Date Due    Diabetic retinal exam  Never done    DTaP/Tdap/Td vaccine (1 - Tdap) Never done    Shingles vaccine (1 of 2) Never done    Pneumococcal 50+ years Vaccine (2 of 2 - PCV) 07/29/2021    Respiratory Syncytial Virus (RSV) Pregnant or age 60 yrs+ (1 - Risk 60-74 years 1-dose series) Never done    COVID-19 Vaccine (1 - 2024-25 season) Never done    Diabetic foot exam  11/07/2024    Annual Wellness Visit (Medicare)  04/03/2025    Cervical cancer screen  07/27/2025    Flu vaccine (Season Ended) 08/01/2025    Colorectal Cancer Screen  08/25/2025    Diabetic Alb to Cr ratio (uACR) test  11/27/2025    A1C test (Diabetic or Prediabetic)  05/27/2026    Lipids  05/27/2026    Depression Screen  05/27/2026    GFR test (Diabetes, CKD 3-4, OR last GFR 15-59)  05/27/2026    Lung Cancer Screening &/or Counseling  06/11/2026    Breast cancer screen  10/15/2026    Hepatitis C screen  Completed    HIV screen  Completed    Hepatitis A vaccine  Aged Out    Hepatitis B vaccine  Aged Out    Hib vaccine  Aged Out    Polio vaccine  Aged Out    Meningococcal (ACWY) vaccine  Aged Out    Meningococcal B vaccine  Aged Out    Pneumococcal 0-49 years Vaccine  Discontinued       Hemoglobin A1C (%)   Date Value   05/27/2025 6.3 (H)   10/01/2024 6.2   10/26/2023 6.2 (H)             ( goal A1C is < 7)   No components found for: \"LABMICR\"  No components found for: \"LDLCHOLESTEROL\", \"LDLCALC\"    (goal LDL is <100)   AST (U/L)   Date Value   05/27/2025 13     ALT (U/L)   Date Value   05/27/2025 11     BUN (mg/dL)   Date Value   05/27/2025 16     BP Readings from Last 3 Encounters:   06/11/25 117/79   05/27/25

## 2025-06-19 RX ORDER — TIRZEPATIDE 2.5 MG/.5ML
INJECTION, SOLUTION SUBCUTANEOUS
Qty: 2 ML | Refills: 0 | Status: SHIPPED | OUTPATIENT
Start: 2025-06-19

## 2025-07-03 DIAGNOSIS — E11.69 TYPE 2 DIABETES MELLITUS WITH OBESITY (HCC): ICD-10-CM

## 2025-07-03 DIAGNOSIS — E66.9 TYPE 2 DIABETES MELLITUS WITH OBESITY (HCC): ICD-10-CM

## 2025-07-03 RX ORDER — TIRZEPATIDE 2.5 MG/.5ML
INJECTION, SOLUTION SUBCUTANEOUS
Qty: 2 ML | Refills: 0 | Status: SHIPPED | OUTPATIENT
Start: 2025-07-03

## 2025-07-17 DIAGNOSIS — J43.9 PULMONARY EMPHYSEMA, UNSPECIFIED EMPHYSEMA TYPE (HCC): ICD-10-CM

## 2025-07-18 RX ORDER — FLUTICASONE FUROATE, UMECLIDINIUM BROMIDE AND VILANTEROL TRIFENATATE 100; 62.5; 25 UG/1; UG/1; UG/1
POWDER RESPIRATORY (INHALATION)
Qty: 60 EACH | Refills: 2 | Status: SHIPPED | OUTPATIENT
Start: 2025-07-18

## 2025-07-29 ENCOUNTER — OFFICE VISIT (OUTPATIENT)
Dept: INTERNAL MEDICINE CLINIC | Age: 60
End: 2025-07-29
Payer: MEDICARE

## 2025-07-29 ENCOUNTER — HOSPITAL ENCOUNTER (OUTPATIENT)
Age: 60
Setting detail: SPECIMEN
Discharge: HOME | End: 2025-07-29

## 2025-07-29 VITALS
HEART RATE: 87 BPM | OXYGEN SATURATION: 98 % | SYSTOLIC BLOOD PRESSURE: 128 MMHG | HEIGHT: 69 IN | BODY MASS INDEX: 30.96 KG/M2 | DIASTOLIC BLOOD PRESSURE: 82 MMHG | WEIGHT: 209 LBS

## 2025-07-29 DIAGNOSIS — D75.1 POLYCYTHEMIA: ICD-10-CM

## 2025-07-29 DIAGNOSIS — E66.9 TYPE 2 DIABETES MELLITUS WITH OBESITY (HCC): ICD-10-CM

## 2025-07-29 DIAGNOSIS — R87.613 HSIL (HIGH GRADE SQUAMOUS INTRAEPITHELIAL LESION) ON PAP SMEAR OF CERVIX: ICD-10-CM

## 2025-07-29 DIAGNOSIS — E11.69 TYPE 2 DIABETES MELLITUS WITH OBESITY (HCC): ICD-10-CM

## 2025-07-29 DIAGNOSIS — Z00.00 MEDICARE ANNUAL WELLNESS VISIT, SUBSEQUENT: Primary | ICD-10-CM

## 2025-07-29 PROBLEM — Z63.6 CAREGIVER STRESS: Status: RESOLVED | Noted: 2017-01-24 | Resolved: 2025-07-29

## 2025-07-29 PROBLEM — R73.03 PREDIABETES: Status: RESOLVED | Noted: 2019-10-03 | Resolved: 2025-07-29

## 2025-07-29 LAB
BASOPHILS # BLD: 0.03 K/UL (ref 0–0.2)
BASOPHILS NFR BLD: 0 % (ref 0–2)
EOSINOPHIL # BLD: 0.12 K/UL (ref 0–0.44)
EOSINOPHILS RELATIVE PERCENT: 1 % (ref 1–4)
ERYTHROCYTE [DISTWIDTH] IN BLOOD BY AUTOMATED COUNT: 13.5 % (ref 11.8–14.4)
HCT VFR BLD AUTO: 48.7 % (ref 36.3–47.1)
HGB BLD-MCNC: 15.8 G/DL (ref 11.9–15.1)
IMM GRANULOCYTES # BLD AUTO: 0.05 K/UL (ref 0–0.3)
IMM GRANULOCYTES NFR BLD: 0 %
LYMPHOCYTES NFR BLD: 2.28 K/UL (ref 1.1–3.7)
LYMPHOCYTES RELATIVE PERCENT: 19 % (ref 24–43)
MCH RBC QN AUTO: 30.3 PG (ref 25.2–33.5)
MCHC RBC AUTO-ENTMCNC: 32.4 G/DL (ref 28.4–34.8)
MCV RBC AUTO: 93.3 FL (ref 82.6–102.9)
MONOCYTES NFR BLD: 0.86 K/UL (ref 0.1–1.2)
MONOCYTES NFR BLD: 7 % (ref 3–12)
NEUTROPHILS NFR BLD: 73 % (ref 36–65)
NEUTS SEG NFR BLD: 8.68 K/UL (ref 1.5–8.1)
NRBC BLD-RTO: 0 PER 100 WBC
PLATELET # BLD AUTO: 359 K/UL (ref 138–453)
PMV BLD AUTO: 10.1 FL (ref 8.1–13.5)
RBC # BLD AUTO: 5.22 M/UL (ref 3.95–5.11)
WBC OTHER # BLD: 12 K/UL (ref 3.5–11.3)

## 2025-07-29 PROCEDURE — 3017F COLORECTAL CA SCREEN DOC REV: CPT | Performed by: NURSE PRACTITIONER

## 2025-07-29 PROCEDURE — G0439 PPPS, SUBSEQ VISIT: HCPCS | Performed by: NURSE PRACTITIONER

## 2025-07-29 PROCEDURE — 3079F DIAST BP 80-89 MM HG: CPT | Performed by: NURSE PRACTITIONER

## 2025-07-29 PROCEDURE — 3074F SYST BP LT 130 MM HG: CPT | Performed by: NURSE PRACTITIONER

## 2025-07-29 PROCEDURE — 3044F HG A1C LEVEL LT 7.0%: CPT | Performed by: NURSE PRACTITIONER

## 2025-07-29 RX ORDER — OXYCODONE AND ACETAMINOPHEN 10; 325 MG/1; MG/1
TABLET ORAL
COMMUNITY
Start: 2025-07-27

## 2025-07-29 ASSESSMENT — PATIENT HEALTH QUESTIONNAIRE - PHQ9
SUM OF ALL RESPONSES TO PHQ QUESTIONS 1-9: 0
SUM OF ALL RESPONSES TO PHQ QUESTIONS 1-9: 0
2. FEELING DOWN, DEPRESSED OR HOPELESS: NOT AT ALL
1. LITTLE INTEREST OR PLEASURE IN DOING THINGS: NOT AT ALL
SUM OF ALL RESPONSES TO PHQ QUESTIONS 1-9: 0
SUM OF ALL RESPONSES TO PHQ QUESTIONS 1-9: 0

## 2025-07-29 NOTE — PROGRESS NOTES
bilaterally- no wheezes, rales or rhonchi, normal air movement, no respiratory distress  Cardiovascular: normal rate, regular rhythm, normal S1 and S2, no murmurs, rubs, clicks, or gallops, distal pulses intact, no carotid bruits  Abdomen: soft, non-tender, non-distended, normal bowel sounds, no masses or organomegaly  Extremities: no cyanosis, clubbing or edema  Musculoskeletal: normal range of motion, no joint swelling, deformity or tenderness  Neurologic: reflexes normal and symmetric, no cranial nerve deficit, gait, coordination and speech normal       Dorsalis pedis and posterior tibial pulses are symmetric.  No fissures between the toes.  No open sores on the feet.  Sensation intact to monofilament testing in 6 of 6 areas tested. No edema in feet.  Jay great toenails and right 5th toenail deformed.           Allergies   Allergen Reactions    Codeine Nausea Only     Prior to Visit Medications    Medication Sig Taking? Authorizing Provider   oxyCODONE-acetaminophen (PERCOCET)  MG per tablet  Yes Provider, MD Isai   Tirzepatide (MOUNJARO) 5 MG/0.5ML SOAJ pen Inject 1 pen  into the skin every 7 days Yes Julia Black APRN - CNP   fluticasone-umeclidin-vilant (TRELEGY ELLIPTA) 100-62.5-25 MCG/ACT AEPB inhaler INHALE 1 PUFF BY MOUTH DAILY Yes Julia Black APRN - CNP   clobetasol (TEMOVATE) 0.05 % ointment Apply to rash twice daily until smooth, then twice weekly until resolved (not face, armpit or groin) Yes Nora Field MD   atorvastatin (LIPITOR) 40 MG tablet Take 1 tablet by mouth daily Yes Julia Black APRN - CNP   lisinopril (PRINIVIL;ZESTRIL) 10 MG tablet Take 1 tablet by mouth daily Yes Julia Black APRN - CNP   amLODIPine (NORVASC) 5 MG tablet TAKE 1 TABLET BY MOUTH DAILY Yes Julia Black APRN - CNP   valACYclovir (VALTREX) 1 g tablet TAKE 1 TABLET BY MOUTH DAILY Yes Julia Black APRN - CNP   albuterol sulfate HFA (PROVENTIL;VENTOLIN;PROAIR) 108 (90 Base) MCG/ACT inhaler

## 2025-09-03 ENCOUNTER — TELEPHONE (OUTPATIENT)
Dept: INTERNAL MEDICINE CLINIC | Age: 60
End: 2025-09-03

## (undated) DEVICE — GLOVE ORANGE PI 7   MSG9070

## (undated) DEVICE — DEFENDO AIR WATER SUCTION AND BIOPSY VALVE KIT FOR  OLYMPUS: Brand: DEFENDO AIR/WATER/SUCTION AND BIOPSY VALVE

## (undated) DEVICE — ENDO KIT W/SYRINGE: Brand: MEDLINE INDUSTRIES, INC.